# Patient Record
Sex: FEMALE | Race: WHITE | NOT HISPANIC OR LATINO | Employment: FULL TIME | URBAN - NONMETROPOLITAN AREA
[De-identification: names, ages, dates, MRNs, and addresses within clinical notes are randomized per-mention and may not be internally consistent; named-entity substitution may affect disease eponyms.]

---

## 2017-01-20 DIAGNOSIS — E06.3 HYPOTHYROIDISM DUE TO HASHIMOTO'S THYROIDITIS: Primary | ICD-10-CM

## 2017-01-23 RX ORDER — THYROID 60 MG
TABLET ORAL
Qty: 60 TABLET | Refills: 0 | Status: SHIPPED | OUTPATIENT
Start: 2017-01-23 | End: 2017-03-01

## 2017-01-23 NOTE — TELEPHONE ENCOUNTER
[place] Thyroid     Last Written Prescription Date: 12/08/2016  Last Quantity: 60, # refills: 0  Last Office Visit with G, P or [facility] prescribing provider: 09/19/2016        TSH   Date Value Ref Range Status   09/19/2016 0.05* 0.40 - 4.00 mU/L Final

## 2017-03-01 DIAGNOSIS — E06.3 HYPOTHYROIDISM DUE TO HASHIMOTO'S THYROIDITIS: ICD-10-CM

## 2017-03-02 RX ORDER — THYROID 60 MG
TABLET ORAL
Qty: 60 TABLET | Refills: 3 | Status: SHIPPED | OUTPATIENT
Start: 2017-03-02 | End: 2017-08-15

## 2017-04-06 DIAGNOSIS — E28.2 PCOS (POLYCYSTIC OVARIAN SYNDROME): ICD-10-CM

## 2017-04-06 RX ORDER — METFORMIN HCL 500 MG
TABLET, EXTENDED RELEASE 24 HR ORAL
Qty: 60 TABLET | Refills: 1 | Status: SHIPPED | OUTPATIENT
Start: 2017-04-06 | End: 2017-07-06

## 2017-04-06 NOTE — TELEPHONE ENCOUNTER
metformin         Last Written Prescription Date: 7/6/16  Last Fill Quantity: 180, # refills: 1  Last Office Visit with G, P or [facility] prescribing provider:  9/19/17        BP Readings from Last 3 Encounters:   10/04/16 131/76   09/19/16 121/70   07/01/16 121/74     No results found for: MICROL  No results found for: UMALCR  Creatinine   Date Value Ref Range Status   12/30/2014 0.60 0.52 - 1.04 mg/dL Final   ]  GFR Estimate   Date Value Ref Range Status   12/30/2014 >90  Non  GFR Calc   >60 mL/min/1.7m2 Final   08/13/2014 >90  Non  GFR Calc   >60 mL/min/1.7m2 Final   12/26/2013 84 >60 mL/min/1.7m2 Final     GFR Estimate If Black   Date Value Ref Range Status   12/30/2014 >90   GFR Calc   >60 mL/min/1.7m2 Final   08/13/2014 >90   GFR Calc   >60 mL/min/1.7m2 Final   12/26/2013 >90 >60 mL/min/1.7m2 Final     Lab Results   Component Value Date    CHOL 193 08/13/2014     Lab Results   Component Value Date    HDL 65 08/13/2014     Lab Results   Component Value Date     08/13/2014     Lab Results   Component Value Date    TRIG 97 08/13/2014     Lab Results   Component Value Date    CHOLHDLRATIO 3.0 08/13/2014     Lab Results   Component Value Date    AST 12 12/26/2013     Lab Results   Component Value Date    ALT 19 12/26/2013     No results found for: A1C  Potassium   Date Value Ref Range Status   12/30/2014 3.8 3.4 - 5.3 mmol/L Final

## 2017-04-19 ENCOUNTER — TELEPHONE (OUTPATIENT)
Dept: FAMILY MEDICINE | Facility: OTHER | Age: 27
End: 2017-04-19

## 2017-04-19 NOTE — TELEPHONE ENCOUNTER
2:56 PM    Reason for Call: OVERBOOK    Patient is having the following symptoms: possible sinus infection for 1 weeks.    The patient is requesting an appointment for sooner than 4-27-17, if possible with Dr Lambert.    Was an appointment offered for this call? Yes she has an appointment scheduled for 4-27-17    Preferred method for responding to this message: Telephone Call [phone]    If we cannot reach you directly, may we leave a detailed response at the number you provided? Yes    Can this message wait until your PCP/provider returns, if unavailable today? YES    Juliana Leon

## 2017-04-20 ENCOUNTER — OFFICE VISIT (OUTPATIENT)
Dept: FAMILY MEDICINE | Facility: OTHER | Age: 27
End: 2017-04-20
Attending: FAMILY MEDICINE
Payer: COMMERCIAL

## 2017-04-20 VITALS
OXYGEN SATURATION: 98 % | HEART RATE: 71 BPM | DIASTOLIC BLOOD PRESSURE: 78 MMHG | RESPIRATION RATE: 20 BRPM | SYSTOLIC BLOOD PRESSURE: 108 MMHG | HEIGHT: 66 IN | BODY MASS INDEX: 30.53 KG/M2 | WEIGHT: 190 LBS | TEMPERATURE: 98.3 F

## 2017-04-20 DIAGNOSIS — H69.91 DYSFUNCTION OF EUSTACHIAN TUBE, RIGHT: ICD-10-CM

## 2017-04-20 DIAGNOSIS — E06.3 HYPOTHYROIDISM DUE TO HASHIMOTO'S THYROIDITIS: Primary | ICD-10-CM

## 2017-04-20 DIAGNOSIS — K58.0 IRRITABLE BOWEL SYNDROME WITH DIARRHEA: ICD-10-CM

## 2017-04-20 LAB — TSH SERPL DL<=0.005 MIU/L-ACNC: 1.17 MU/L (ref 0.4–4)

## 2017-04-20 PROCEDURE — 99214 OFFICE O/P EST MOD 30 MIN: CPT | Performed by: FAMILY MEDICINE

## 2017-04-20 PROCEDURE — 84443 ASSAY THYROID STIM HORMONE: CPT | Performed by: FAMILY MEDICINE

## 2017-04-20 PROCEDURE — 36415 COLL VENOUS BLD VENIPUNCTURE: CPT | Performed by: FAMILY MEDICINE

## 2017-04-20 ASSESSMENT — ANXIETY QUESTIONNAIRES
1. FEELING NERVOUS, ANXIOUS, OR ON EDGE: NOT AT ALL
3. WORRYING TOO MUCH ABOUT DIFFERENT THINGS: NOT AT ALL
4. TROUBLE RELAXING: NOT AT ALL
IF YOU CHECKED OFF ANY PROBLEMS ON THIS QUESTIONNAIRE, HOW DIFFICULT HAVE THESE PROBLEMS MADE IT FOR YOU TO DO YOUR WORK, TAKE CARE OF THINGS AT HOME, OR GET ALONG WITH OTHER PEOPLE: NOT DIFFICULT AT ALL
GAD7 TOTAL SCORE: 0
5. BEING SO RESTLESS THAT IT IS HARD TO SIT STILL: NOT AT ALL
2. NOT BEING ABLE TO STOP OR CONTROL WORRYING: NOT AT ALL
7. FEELING AFRAID AS IF SOMETHING AWFUL MIGHT HAPPEN: NOT AT ALL
6. BECOMING EASILY ANNOYED OR IRRITABLE: NOT AT ALL

## 2017-04-20 ASSESSMENT — PAIN SCALES - GENERAL: PAINLEVEL: NO PAIN (1)

## 2017-04-20 NOTE — MR AVS SNAPSHOT
After Visit Summary   4/20/2017    Meredith Gordon    MRN: [id number]           Patient Information     Date Of Birth          1990        Visit Information        Provider Department      4/20/2017 9:15 AM Jovana Lambert MD [facility]        Today's Diagnoses     Hypothyroidism due to Hashimoto's thyroiditis    -  1    Dysfunction of eustachian tube, right          Care Instructions      Irritable Bowel Syndrome    Irritable bowel syndrome (IBS) is a disorder of the intestines. It is not a disease, but a group of symptoms caused by changes in the way the intestines work. It is fairly common, but the cause is not well understood.  Symptoms of IBS include:    Abdominal pain, discomfort, and cramping    Diarrhea    Constipation or dry, hard stools    Mucous stool    Bloating    Feeling of incomplete bowel movements  It usually results in one of 3 patterns of symptoms:    Chronic abdominal pain and constipation    Recurring episodes of diarrhea, with or without pain    Alternating diarrhea and constipation  Home care  The goal of treatment is to control and relieve your symptoms, so you can lead a full and active life. There is no cure for IBS. But it can be managed.  Diet  Your diet did not cause your IBS, but it can affect it. No one diet works for everyone. Finding the best foods for you may take trial and error. Keep a food log to help find what foods made your symptoms worse. Below are some tips that may help you.    Eat more slowly. Eat smaller amounts at a time, but more often. Remember, you can always eat more, but cannot eat less once you ve eaten too much.    High-fiber foods are complicated. While they may help relieve constipation, they can make your bloating, cramping, gas, and diarrhea worse.    Eat less sugar.    Try cutting out dairy products. Sometimes this helps.    Try cutting out foods that are high in fat and fatty meats.    You can control bloating or passing  excess gas. Be careful with  gassy  vegetables and fruits like beans, cabbage, broccoli, and cauliflower.    Be careful with carbonated beverages and fruit juices. They can make your bloating and diarrhea worse.    Caffeine, alcohol, and stimulants can make symptoms worse. These include coffee, tea, sodas, energy drinks, and chocolate.  Lifestyle    Look for factors that seem to worsen your symptoms. These include stress and emotions.     Although stress does not cause IBS, it may trigger flare-ups. Counseling can help you learn to handle stress. So can self-help measures like exercise, yoga, and meditation.    Depression can occur along with IBS. Your health care provider may prescribe antidepressant medicine. This may help with diarrhea, constipation, and cramping, as well as with symptoms of depression.    Smoking doesn't cause IBS, but can make the symptoms worse.  Medicines  Your healthcare provider may prescribe medicines. Take them as directed. For acute flare-ups of your illness, your provider may give you prescription medicines.    Check with your health care provider before taking any medicines for diarrhea.    Avoid anti-inflammatory medicines like ibuprofen or naproxen.    Consider nutritional supplements. This is especially true if your diarrhea is prolonged, or you aren't eating or are losing weight  Follow-up care  Follow up with your health care provider, or as advised. If a stool sample was taken or cultures were done, you will be told if your treatment needs to change. You can call as directed for the results.  When to seek medical advice  Call your health care provider right away if any of these occur:    Abdominal pain gets worse    Constant abdominal pain moves to the right-lower abdomen    You can't keep liquids down because of vomiting    You have severe diarrhea    You have blood (red or black color) or mucus in your stool    You feel very weak or dizzy, faint, or have extreme thirst    You  "have a fever of 100.4 F (38.0 C) or higher, or as directed by your health care provider    7810-7191 [employer]. [address] [place] [address], [place] [address]. All rights reserved. This information is not intended as a substitute for professional medical care. Always follow your healthcare professional's instructions.              Follow-ups after your visit        Who to contact     If you have questions or need follow up information about today's clinic visit or your schedule please contact [facility] directly at [phone].  Normal or non-critical lab and imaging results will be communicated to you by [employer], letter or phone within 4 business days after the clinic has received the results. If you do not hear from us within 7 days, please contact the clinic through [employer] or phone. If you have a critical or abnormal lab result, we will notify you by phone as soon as possible.  Submit refill requests through [employer] or call your pharmacy and they will forward the refill request to us. Please allow 3 business days for your refill to be completed.          Additional Information About Your Visit        MyChart Information     [employer] gives you secure access to your electronic health record. If you see a primary care provider, you can also send messages to your care team and make appointments. If you have questions, please call your primary care clinic.  If you do not have a primary care provider, please call [phone] and they will assist you.        Care EveryWhere ID     This is your Care EveryWhere ID. This could be used by other organizations to access your [facility] medical records  [id number]        Your Vitals Were     Pulse Temperature Respirations Height Pulse Oximetry BMI (Body Mass Index)    71 98.3  F (36.8  C) (Tympanic) 20 5' 5.5\" (1.664 m) 98% 31.14 kg/m2       Blood Pressure from Last 3 Encounters:   04/20/17 108/78   10/04/16 131/76   09/19/16 121/70    Weight from " Last 3 Encounters:   04/20/17 190 lb (86.2 kg)   09/19/16 191 lb (86.6 kg)   07/01/16 198 lb (89.8 kg)              We Performed the Following     TSH with free T4 reflex        Primary Care Provider Office Phone # Fax #    Jovana Lambert [phone]       [facility] [place] [address]  [place] [address]        Thank you!     Thank you for choosing [facility]  for your care. Our goal is always to provide you with excellent care. Hearing back from our patients is one way we can continue to improve our services. Please take a few minutes to complete the written survey that you may receive in the mail after your visit with us. Thank you!             Your Updated Medication List - Protect others around you: Learn how to safely use, store and throw away your medicines at www.disposemymeds.org.          This list is accurate as of: 4/20/17  9:30 AM.  Always use your most recent med list.                   Brand Name Dispense Instructions for use    DYAN THYROID 60 MG tablet   Generic drug:  thyroid     60 tablet    Take 1 tablet (60 mg) by mouth 2 times daily       metFORMIN 500 MG 24 hr tablet    GLUCOPHAGE-XR    60 tablet    TAKE 2 TABLETS BY MOUTH EVERY NIGHT AT BEDTIME       mupirocin 2 % ointment    BACTROBAN    15 g    Apply topically 2 times daily       spironolactone 50 MG tablet    ALDACTONE    180 tablet    Take 1 tablet (50 mg) by mouth 2 times daily

## 2017-04-20 NOTE — PROGRESS NOTES
SUBJECTIVE:                                                    Meredith Gordon is a 27 year old female who presents to clinic today for the following health issues:      RESPIRATORY SYMPTOMS      Duration: 3 weeks    Description  nasal congestion, facial pain/pressure and ear pain right    Severity: moderate    Accompanying signs and symptoms: None    History (predisposing factors):  none    Precipitating or alleviating factors: None    Therapies tried and outcome:  rest and fluids       Diarrhea      Duration: one day    Description:       Consistency of stool: loose       Blood in stool: no        Number of loose stools past 24 hours: 2    Intensity:  mild    Accompanying signs and symptoms:       Fever: no        Nausea/vomitting: YES       Abdominal pain: no        Weight loss: no     History (recent antibiotics or travel/ill contacts/med changes/testing done): ate pastrami panini    Precipitating or alleviating factors: None    Therapies tried and outcome: none       Hypothyroidism Follow-up      Since last visit, patient describes the following symptoms: Weight stable, no hair loss, no skin changes, no constipation, no loose stools       Problem list and histories reviewed & adjusted, as indicated.  Additional history: as documented    Current Outpatient Prescriptions   Medication Sig Dispense Refill     metFORMIN (GLUCOPHAGE-XR) 500 MG 24 hr tablet TAKE 2 TABLETS BY MOUTH EVERY NIGHT AT BEDTIME 60 tablet 1     ARMOUR THYROID 60 MG tablet Take 1 tablet (60 mg) by mouth 2 times daily 60 tablet 3     mupirocin (BACTROBAN) 2 % ointment Apply topically 2 times daily 15 g 1     spironolactone (ALDACTONE) 50 MG tablet Take 1 tablet (50 mg) by mouth 2 times daily 180 tablet 1     Labs reviewed in EPIC    Reviewed and updated as needed this visit by clinical staff  Tobacco  Allergies  Meds  Med Hx  Surg Hx  Fam Hx  Soc Hx      Reviewed and updated as needed this visit by Provider         ROS:  Constitutional,  "HEENT, cardiovascular, pulmonary, gi and gu systems are negative, except as otherwise noted.    OBJECTIVE:                                                    /78 (BP Location: Right arm, Patient Position: Chair, Cuff Size: Adult Large)  Pulse 71  Temp 98.3  F (36.8  C) (Tympanic)  Resp 20  Ht 5' 5.5\" (1.664 m)  Wt 190 lb (86.2 kg)  SpO2 98%  BMI 31.14 kg/m2  Body mass index is 31.14 kg/(m^2).  GENERAL APPEARANCE: healthy, alert and no distress  HENT: ear canals and TM's normal, nose and mouth without ulcers or lesions and TM fluid bilateral  NECK: no adenopathy, no asymmetry, masses, or scars and thyroid normal to palpation  RESP: lungs clear to auscultation - no rales, rhonchi or wheezes  CV: regular rates and rhythm, normal S1 S2, no S3 or S4 and no murmur, click or rub  ABDOMEN: soft, nontender, without hepatosplenomegaly or masses and bowel sounds normal  PSYCH: mentation appears normal and affect normal/bright       ASSESSMENT/PLAN:                                                    1. Hypothyroidism due to Hashimoto's thyroiditis  Due for labs  - TSH with free T4 reflex    2. Dysfunction of eustachian tube, right  With viral URI  dsicussed conservative measures    3. Irritable bowel syndrome with diarrhea  dsicussed trial of GF or dairy free foods  F/u if not improving    aslso doing weight watchers but having slow progress    Patient was agreeable to this plan and had no further questions.  See Patient Instructions    Jovana Lambert MD  [facility] [place]  "

## 2017-04-20 NOTE — NURSING NOTE
"Chief Complaint   Patient presents with     Sinus Problem       Initial /78 (BP Location: Right arm, Patient Position: Chair, Cuff Size: Adult Large)  Pulse 71  Temp 98.3  F (36.8  C) (Tympanic)  Resp 20  Ht 5' 5.5\" (1.664 m)  Wt 190 lb (86.2 kg)  SpO2 98%  BMI 31.14 kg/m2 Estimated body mass index is 31.14 kg/(m^2) as calculated from the following:    Height as of this encounter: 5' 5.5\" (1.664 m).    Weight as of this encounter: 190 lb (86.2 kg).  Medication Reconciliation: complete   Genevieve Mead      "

## 2017-04-20 NOTE — PATIENT INSTRUCTIONS
Irritable Bowel Syndrome    Irritable bowel syndrome (IBS) is a disorder of the intestines. It is not a disease, but a group of symptoms caused by changes in the way the intestines work. It is fairly common, but the cause is not well understood.  Symptoms of IBS include:    Abdominal pain, discomfort, and cramping    Diarrhea    Constipation or dry, hard stools    Mucous stool    Bloating    Feeling of incomplete bowel movements  It usually results in one of 3 patterns of symptoms:    Chronic abdominal pain and constipation    Recurring episodes of diarrhea, with or without pain    Alternating diarrhea and constipation  Home care  The goal of treatment is to control and relieve your symptoms, so you can lead a full and active life. There is no cure for IBS. But it can be managed.  Diet  Your diet did not cause your IBS, but it can affect it. No one diet works for everyone. Finding the best foods for you may take trial and error. Keep a food log to help find what foods made your symptoms worse. Below are some tips that may help you.    Eat more slowly. Eat smaller amounts at a time, but more often. Remember, you can always eat more, but cannot eat less once you ve eaten too much.    High-fiber foods are complicated. While they may help relieve constipation, they can make your bloating, cramping, gas, and diarrhea worse.    Eat less sugar.    Try cutting out dairy products. Sometimes this helps.    Try cutting out foods that are high in fat and fatty meats.    You can control bloating or passing excess gas. Be careful with  gassy  vegetables and fruits like beans, cabbage, broccoli, and cauliflower.    Be careful with carbonated beverages and fruit juices. They can make your bloating and diarrhea worse.    Caffeine, alcohol, and stimulants can make symptoms worse. These include coffee, tea, sodas, energy drinks, and chocolate.  Lifestyle    Look for factors that seem to worsen your symptoms. These include stress and  emotions.     Although stress does not cause IBS, it may trigger flare-ups. Counseling can help you learn to handle stress. So can self-help measures like exercise, yoga, and meditation.    Depression can occur along with IBS. Your health care provider may prescribe antidepressant medicine. This may help with diarrhea, constipation, and cramping, as well as with symptoms of depression.    Smoking doesn't cause IBS, but can make the symptoms worse.  Medicines  Your healthcare provider may prescribe medicines. Take them as directed. For acute flare-ups of your illness, your provider may give you prescription medicines.    Check with your health care provider before taking any medicines for diarrhea.    Avoid anti-inflammatory medicines like ibuprofen or naproxen.    Consider nutritional supplements. This is especially true if your diarrhea is prolonged, or you aren't eating or are losing weight  Follow-up care  Follow up with your health care provider, or as advised. If a stool sample was taken or cultures were done, you will be told if your treatment needs to change. You can call as directed for the results.  When to seek medical advice  Call your health care provider right away if any of these occur:    Abdominal pain gets worse    Constant abdominal pain moves to the right-lower abdomen    You can't keep liquids down because of vomiting    You have severe diarrhea    You have blood (red or black color) or mucus in your stool    You feel very weak or dizzy, faint, or have extreme thirst    You have a fever of 100.4 F (38.0 C) or higher, or as directed by your health care provider    0171-6524 [employer]. [address] [place] [address], [place] [address]. All rights reserved. This information is not intended as a substitute for professional medical care. Always follow your healthcare professional's instructions.

## 2017-04-21 ASSESSMENT — PATIENT HEALTH QUESTIONNAIRE - PHQ9: SUM OF ALL RESPONSES TO PHQ QUESTIONS 1-9: 2

## 2017-04-21 ASSESSMENT — ANXIETY QUESTIONNAIRES: GAD7 TOTAL SCORE: 0

## 2017-05-23 DIAGNOSIS — L70.8 OTHER ACNE: ICD-10-CM

## 2017-05-23 DIAGNOSIS — E28.2 PCOS (POLYCYSTIC OVARIAN SYNDROME): ICD-10-CM

## 2017-05-25 RX ORDER — SPIRONOLACTONE 50 MG/1
TABLET, FILM COATED ORAL
Qty: 180 TABLET | Refills: 0 | Status: SHIPPED | OUTPATIENT
Start: 2017-05-25 | End: 2017-08-15

## 2017-07-06 DIAGNOSIS — E28.2 PCOS (POLYCYSTIC OVARIAN SYNDROME): ICD-10-CM

## 2017-07-07 RX ORDER — METFORMIN HCL 500 MG
TABLET, EXTENDED RELEASE 24 HR ORAL
Qty: 60 TABLET | Refills: 1 | Status: SHIPPED | OUTPATIENT
Start: 2017-07-07 | End: 2017-09-21

## 2017-07-07 NOTE — TELEPHONE ENCOUNTER
Metformin         Last Written Prescription Date: 4/06/2017  Last Fill Quantity: 60, # refills: 0  Last Office Visit with G, P or [facility] prescribing provider:  4/20/2017        BP Readings from Last 3 Encounters:   04/20/17 108/78   10/04/16 131/76   09/19/16 121/70     No results found for: MICROL  No results found for: UMALCR  Creatinine   Date Value Ref Range Status   12/30/2014 0.60 0.52 - 1.04 mg/dL Final   ]  GFR Estimate   Date Value Ref Range Status   12/30/2014 >90  Non  GFR Calc   >60 mL/min/1.7m2 Final   08/13/2014 >90  Non  GFR Calc   >60 mL/min/1.7m2 Final   12/26/2013 84 >60 mL/min/1.7m2 Final     GFR Estimate If Black   Date Value Ref Range Status   12/30/2014 >90   GFR Calc   >60 mL/min/1.7m2 Final   08/13/2014 >90   GFR Calc   >60 mL/min/1.7m2 Final   12/26/2013 >90 >60 mL/min/1.7m2 Final     Lab Results   Component Value Date    CHOL 193 08/13/2014     Lab Results   Component Value Date    HDL 65 08/13/2014     Lab Results   Component Value Date     08/13/2014     Lab Results   Component Value Date    TRIG 97 08/13/2014     Lab Results   Component Value Date    CHOLHDLRATIO 3.0 08/13/2014     Lab Results   Component Value Date    AST 12 12/26/2013     Lab Results   Component Value Date    ALT 19 12/26/2013     No results found for: A1C  Potassium   Date Value Ref Range Status   12/30/2014 3.8 3.4 - 5.3 mmol/L Final

## 2017-08-15 DIAGNOSIS — E28.2 PCOS (POLYCYSTIC OVARIAN SYNDROME): ICD-10-CM

## 2017-08-15 DIAGNOSIS — L70.8 OTHER ACNE: ICD-10-CM

## 2017-08-15 DIAGNOSIS — E06.3 HYPOTHYROIDISM DUE TO HASHIMOTO'S THYROIDITIS: ICD-10-CM

## 2017-08-16 RX ORDER — THYROID 60 MG
TABLET ORAL
Qty: 60 TABLET | Refills: 7 | Status: SHIPPED | OUTPATIENT
Start: 2017-08-16 | End: 2017-09-21 | Stop reason: DRUGHIGH

## 2017-08-16 RX ORDER — SPIRONOLACTONE 50 MG/1
TABLET, FILM COATED ORAL
Qty: 180 TABLET | Refills: 1 | Status: SHIPPED | OUTPATIENT
Start: 2017-08-16 | End: 2018-02-08

## 2017-09-20 ENCOUNTER — OFFICE VISIT (OUTPATIENT)
Dept: FAMILY MEDICINE | Facility: OTHER | Age: 27
End: 2017-09-20
Attending: FAMILY MEDICINE
Payer: COMMERCIAL

## 2017-09-20 VITALS
DIASTOLIC BLOOD PRESSURE: 72 MMHG | HEIGHT: 66 IN | TEMPERATURE: 97.8 F | SYSTOLIC BLOOD PRESSURE: 118 MMHG | BODY MASS INDEX: 31.98 KG/M2 | WEIGHT: 199 LBS | OXYGEN SATURATION: 99 % | HEART RATE: 91 BPM | RESPIRATION RATE: 14 BRPM

## 2017-09-20 DIAGNOSIS — E28.2 PCOS (POLYCYSTIC OVARIAN SYNDROME): ICD-10-CM

## 2017-09-20 DIAGNOSIS — K21.9 GASTROESOPHAGEAL REFLUX DISEASE, ESOPHAGITIS PRESENCE NOT SPECIFIED: ICD-10-CM

## 2017-09-20 DIAGNOSIS — Z00.00 ROUTINE GENERAL MEDICAL EXAMINATION AT A HEALTH CARE FACILITY: ICD-10-CM

## 2017-09-20 DIAGNOSIS — E06.3 HYPOTHYROIDISM DUE TO HASHIMOTO'S THYROIDITIS: Primary | ICD-10-CM

## 2017-09-20 DIAGNOSIS — E78.5 HYPERLIPIDEMIA LDL GOAL <130: ICD-10-CM

## 2017-09-20 PROCEDURE — 99395 PREV VISIT EST AGE 18-39: CPT | Performed by: FAMILY MEDICINE

## 2017-09-20 ASSESSMENT — ANXIETY QUESTIONNAIRES
5. BEING SO RESTLESS THAT IT IS HARD TO SIT STILL: NOT AT ALL
3. WORRYING TOO MUCH ABOUT DIFFERENT THINGS: NOT AT ALL
IF YOU CHECKED OFF ANY PROBLEMS ON THIS QUESTIONNAIRE, HOW DIFFICULT HAVE THESE PROBLEMS MADE IT FOR YOU TO DO YOUR WORK, TAKE CARE OF THINGS AT HOME, OR GET ALONG WITH OTHER PEOPLE: NOT DIFFICULT AT ALL
6. BECOMING EASILY ANNOYED OR IRRITABLE: NOT AT ALL
4. TROUBLE RELAXING: SEVERAL DAYS
GAD7 TOTAL SCORE: 1
1. FEELING NERVOUS, ANXIOUS, OR ON EDGE: NOT AT ALL
7. FEELING AFRAID AS IF SOMETHING AWFUL MIGHT HAPPEN: NOT AT ALL
2. NOT BEING ABLE TO STOP OR CONTROL WORRYING: NOT AT ALL

## 2017-09-20 ASSESSMENT — PATIENT HEALTH QUESTIONNAIRE - PHQ9: SUM OF ALL RESPONSES TO PHQ QUESTIONS 1-9: 5

## 2017-09-20 ASSESSMENT — PAIN SCALES - GENERAL: PAINLEVEL: NO PAIN (0)

## 2017-09-20 NOTE — MR AVS SNAPSHOT
After Visit Summary   9/20/2017    Meredith Gordon    MRN: [id number]           Patient Information     Date Of Birth          1990        Visit Information        Provider Department      9/20/2017 1:30 PM Jovana Lambert MD [facility]        Today's Diagnoses     Hypothyroidism due to Hashimoto's thyroiditis    -  1    Routine general medical examination at a health care facility        PCOS (polycystic ovarian syndrome)        Hyperlipidemia LDL goal <130          Care Instructions      Preventive Health Recommendations  Female Ages 26 - 39  Yearly exam:   See your health care provider every year in order to    Review health changes.     Discuss preventive care.      Review your medicines if you your doctor has prescribed any.    Until age 30: Get a Pap test every three years (more often if you have had an abnormal result).    After age 30: Talk to your doctor about whether you should have a Pap test every 3 years or have a Pap test with HPV screening every 5 years.   You do not need a Pap test if your uterus was removed (hysterectomy) and you have not had cancer.  You should be tested each year for STDs (sexually transmitted diseases), if you're at risk.   Talk to your provider about how often to have your cholesterol checked.  If you are at risk for diabetes, you should have a diabetes test (fasting glucose).  Shots: Get a flu shot each year. Get a tetanus shot every 10 years.   Nutrition:     Eat at least 5 servings of fruits and vegetables each day.    Eat whole-grain bread, whole-wheat pasta and brown rice instead of white grains and rice.    Talk to your provider about Calcium and Vitamin D.     Lifestyle    Exercise at least 150 minutes a week (30 minutes a day, 5 days of the week). This will help you control your weight and prevent disease.    Limit alcohol to one drink per day.    No smoking.     Wear sunscreen to prevent skin cancer.    See your dentist every six  "months for an exam and cleaning.            Follow-ups after your visit        Future tests that were ordered for you today     Open Future Orders        Priority Expected Expires Ordered    Comprehensive metabolic panel Routine  9/20/2018 9/20/2017    TSH with free T4 reflex Routine  9/20/2018 9/20/2017    Lipid Profile (Chol, Trig, HDL, LDL calc) Routine  9/20/2018 9/20/2017            Who to contact     If you have questions or need follow up information about today's clinic visit or your schedule please contact [facility] directly at [phone].  Normal or non-critical lab and imaging results will be communicated to you by "[employer]hart, letter or phone within 4 business days after the clinic has received the results. If you do not hear from us within 7 days, please contact the clinic through [employer] or phone. If you have a critical or abnormal lab result, we will notify you by phone as soon as possible.  Submit refill requests through [employer] or call your pharmacy and they will forward the refill request to us. Please allow 3 business days for your refill to be completed.          Additional Information About Your Visit        MyChart Information     [employer] gives you secure access to your electronic health record. If you see a primary care provider, you can also send messages to your care team and make appointments. If you have questions, please call your primary care clinic.  If you do not have a primary care provider, please call [phone] and they will assist you.        Care EveryWhere ID     This is your Care EveryWhere ID. This could be used by other organizations to access your [facility] medical records  [id number]        Your Vitals Were     Pulse Temperature Respirations Height Pulse Oximetry BMI (Body Mass Index)    91 97.8  F (36.6  C) (Tympanic) 14 5' 5.5\" (1.664 m) 99% 32.61 kg/m2       Blood Pressure from Last 3 Encounters:   09/20/17 118/72   04/20/17 108/78   10/04/16 131/76    " Weight from Last 3 Encounters:   09/20/17 199 lb (90.3 kg)   04/20/17 190 lb (86.2 kg)   09/19/16 191 lb (86.6 kg)               Primary Care Provider Office Phone # Fax #    Jovana Lambert [phone]       [facility] [place] [address]  [place] [address]        Equal Access to Services     NICK 81[employer] GroupSATURNINO : Hadii aad ku hadasho Soomaali, waaxda luqadaha, qaybta kaalmada adeegyada, waxay idiin hayaan adeeg kharash la'aan ah. So [facility] [phone].    ATENCIÓN: Si thalia crain, tiene a leija disposición servicios gratuitos de asistencia lingüística. Llame al [phone].    We comply with applicable federal civil rights laws and [place] laws. We do not discriminate on the basis of race, color, national origin, age, disability sex, sexual orientation or gender identity.            Thank you!     [facility] you [facility]  [facility]. Our goal is always to provide you with excellent care. Hearing back from our patients is one way we can continue to improve our services. Please take a few minutes to complete the written survey that you may receive in the mail after your visit with us. Thank you!             Your Updated Medication List - Protect others around you: Learn how to safely use, store and throw away your medicines at www.disposemymeds.org.          This list is accurate as of: 9/20/17  2:25 PM.  Always use your most recent med list.                   Brand Name Dispense Instructions for use Diagnosis    ARMOUR THYROID 60 MG tablet   Generic drug:  thyroid     60 tablet    Take 1 tablet (60 mg) by mouth 2 times daily    Hypothyroidism due to Hashimoto's thyroiditis       metFORMIN 500 MG 24 hr tablet    GLUCOPHAGE-XR    60 tablet    Take 2 Tabs by mouth every night at bedtime.    PCOS (polycystic ovarian syndrome)       mupirocin 2 % ointment    BACTROBAN    15 g    Apply topically 2 times daily        spironolactone 50 MG tablet    ALDACTONE    180 tablet     Take one tablet by mouth two times a day.    PCOS (polycystic ovarian syndrome), Other acne

## 2017-09-20 NOTE — NURSING NOTE
"Chief Complaint   Patient presents with     Physical       Initial /72 (BP Location: Left arm, Patient Position: Chair, Cuff Size: Adult Regular)  Pulse 91  Temp 97.8  F (36.6  C) (Tympanic)  Resp 14  Ht 5' 5.5\" (1.664 m)  Wt 199 lb (90.3 kg)  SpO2 99%  BMI 32.61 kg/m2 Estimated body mass index is 32.61 kg/(m^2) as calculated from the following:    Height as of this encounter: 5' 5.5\" (1.664 m).    Weight as of this encounter: 199 lb (90.3 kg).  Medication Reconciliation: complete     Savannah Martines     "

## 2017-09-20 NOTE — PROGRESS NOTES
SUBJECTIVE:   CC: Meredith Gordon is an 27 year old woman who presents for preventive health visit.     Healthy Habits:    Do you get at least three servings of calcium containing foods daily (dairy, green leafy vegetables, etc.)? yes    Amount of exercise or daily activities, outside of work: 2-3 day(s) per week    Problems taking medications regularly No    Medication side effects: No    Have you had an eye exam in the past two years? yes    Do you see a dentist twice per year? yes    Do you have sleep apnea, excessive snoring or daytime drowsiness?no          Irregular periods      Duration: 2-3 months    Description (location/character/radiation): getting period way to frequently, every 2 weeks. Brown blood. Clots. But not normal or consistent flow    Intensity:  mild    Accompanying signs and symptoms: none    History (similar episodes/previous evaluation): None    Precipitating or alleviating factors: None    Therapies tried and outcome: None         Today's PHQ-2 Score: PHQ-2 ( 1999 [employer]) 9/17/2017   Q1: Little interest or pleasure in doing things 1   Q2: Feeling down, depressed or hopeless 0   PHQ-2 Score 1   Q1: Little interest or pleasure in doing things Several days   Q2: Feeling down, depressed or hopeless Not at all   PHQ-2 Score 1         Abuse: Current or Past(Physical, Sexual or Emotional)- No  Do you feel safe in your environment - Yes    Social History   Substance Use Topics     Smoking status: Never Smoker     Smokeless tobacco: Never Used     Alcohol use 0.0 oz/week     0 Standard drinks or equivalent per week      Comment: 1/week     The patient does not drink >3 drinks per day nor >7 drinks per week.    Reviewed orders with patient.  Reviewed health maintenance and updated orders accordingly - Yes  BP Readings from Last 3 Encounters:   09/20/17 118/72   04/20/17 108/78   10/04/16 131/76    Wt Readings from Last 3 Encounters:   09/20/17 199 lb (90.3 kg)   04/20/17 190 lb (86.2 kg)    09/19/16 191 lb (86.6 kg)                  Patient Active Problem List   Diagnosis     PCOS (polycystic ovarian syndrome)     ACP (advance care planning)     Hypothyroidism due to Hashimoto's thyroiditis     Past Surgical History:   Procedure Laterality Date     MAMMOPLASTY REDUCTION  2015     wisdom teeth         Social History   Substance Use Topics     Smoking status: Never Smoker     Smokeless tobacco: Never Used     Alcohol use 0.0 oz/week     0 Standard drinks or equivalent per week      Comment: 1/week     Family History   Problem Relation Age of Onset     Thyroid Disease Mother      hypothyroidism     Nephrolithiasis Father      Other - See Comments Sister      pcos     Family History Negative Sister      Family History Negative Brother      Family History Negative Brother      Family History Negative Brother      Family History Negative Sister      Hypothyroidism Maternal Grandmother      Rheumatoid Arthritis Maternal Grandfather      DIABETES Paternal Grandfather      type 2     Hypertension Paternal Grandfather          Current Outpatient Prescriptions   Medication Sig Dispense Refill     ARMOUR THYROID 60 MG tablet Take 1 tablet (60 mg) by mouth 2 times daily 60 tablet 7     spironolactone (ALDACTONE) 50 MG tablet Take one tablet by mouth two times a day. 180 tablet 1     metFORMIN (GLUCOPHAGE-XR) 500 MG 24 hr tablet Take 2 Tabs by mouth every night at bedtime. 60 tablet 1     mupirocin (BACTROBAN) 2 % ointment Apply topically 2 times daily 15 g 1     Allergies   Allergen Reactions     Cats Itching     Cat/feline product derivatives           Mammogram not appropriate for this patient based on age.    Pertinent mammograms are reviewed under the imaging tab.  History of abnormal Pap smear: NO - age 21-29 PAP every 3 years recommended    Reviewed and updated as needed this visit by clinical staffTobacco  Allergies  Meds  Problems  Med Hx  Surg Hx  Fam Hx  Soc Hx          Reviewed and updated as  "needed this visit by Provider        Past Medical History:   Diagnosis Date     B12 deficiency 2012    resolved     Hashimoto's disease 2011     Nephrolithiasis     2011 in [facility] ([facility])     PCOS (polycystic ovarian syndrome)       Past Surgical History:   Procedure Laterality Date     MAMMOPLASTY REDUCTION  2015     wisdom teeth       Obstetric History       T0      L0     SAB0   TAB0   Ectopic0   Multiple0   Live Births0           ROS:  C: NEGATIVE for fever, chills, change in weight  I: NEGATIVE for worrisome rashes, moles or lesions  E: NEGATIVE for vision changes or irritation  ENT: NEGATIVE for ear, mouth and throat problems  R: NEGATIVE for significant cough or SOB  B: NEGATIVE for masses, tenderness or discharge  CV: NEGATIVE for chest pain, palpitations or peripheral edema  GI: NEGATIVE for nausea, abdominal pain, heartburn, or change in bowel habits  : NEGATIVE for unusual urinary or vaginal symptoms. Periods are irregular.  M: NEGATIVE for significant arthralgias or myalgia  N: NEGATIVE for weakness, dizziness or paresthesias  P: NEGATIVE for changes in mood or affect    OBJECTIVE:   /72 (BP Location: Left arm, Patient Position: Chair, Cuff Size: Adult Regular)  Pulse 91  Temp 97.8  F (36.6  C) (Tympanic)  Resp 14  Ht 5' 5.5\" (1.664 m)  Wt 199 lb (90.3 kg)  SpO2 99%  BMI 32.61 kg/m2  EXAM:  GENERAL: healthy, alert and no distress  EYES: Eyes grossly normal to inspection, PERRL and conjunctivae and sclerae normal  HENT: ear canals and TM's normal, nose and mouth without ulcers or lesions  NECK: no adenopathy, no asymmetry, masses, or scars and thyroid normal to palpation  RESP: lungs clear to auscultation - no rales, rhonchi or wheezes  BREAST: normal without masses, tenderness or nipple discharge and no palpable axillary masses or adenopathy  CV: regular rate and rhythm, normal S1 S2, no S3 or S4, no murmur, click or rub, no peripheral edema and " "peripheral pulses strong  ABDOMEN: soft, nontender, no hepatosplenomegaly, no masses and bowel sounds normal  MS: no gross musculoskeletal defects noted, no edema  SKIN: no suspicious lesions or rashes  NEURO: Normal strength and tone, mentation intact and speech normal  PSYCH: mentation appears normal, affect normal/bright    ASSESSMENT/PLAN:   1. Hypothyroidism due to Hashimoto's thyroiditis    - TSH with free T4 reflex; Future    2. Routine general medical examination at a health care facility      3. PCOS (polycystic ovarian syndrome)  Pending thyroid results may need u/s  - Comprehensive metabolic panel; Future    4. Hyperlipidemia LDL goal <130    - Comprehensive metabolic panel; Future  - Lipid Profile (Chol, Trig, HDL, LDL calc); Future    5.  irreg menses -- if thyroid is wnl, may need to back off on spironolactone or increase metformin    COUNSELING:   Reviewed preventive health counseling, as reflected in patient instructions  Special attention given to:        Regular exercise       Healthy diet/nutrition       Vision screening       Hearing screening         reports that she has never smoked. She has never used smokeless tobacco.    Estimated body mass index is 32.61 kg/(m^2) as calculated from the following:    Height as of this encounter: 5' 5.5\" (1.664 m).    Weight as of this encounter: 199 lb (90.3 kg).   Weight management plan: Discussed healthy diet and exercise guidelines and patient will follow up in 3 months in clinic to re-evaluate.    Counseling Resources:  ATP IV Guidelines  Pooled Cohorts Equation Calculator  Breast Cancer Risk Calculator  FRAX Risk Assessment  ICSI Preventive Guidelines  Dietary Guidelines for Americans, 2010  USDA's MyPlate  ASA Prophylaxis  Lung CA Screening    Jovana Lambert MD  [facility] [place]  Answers for HPI/ROS submitted by the patient on 9/17/2017   Annual Exam:  Getting at least 3 servings of Calcium per day:: Yes  Bi-annual eye exam:: Yes  Dental " care twice a year:: Yes  Sleep apnea or symptoms of sleep apnea:: Daytime drowsiness  Diet:: Regular (no restrictions)  Frequency of exercise:: 2-3 days/week  Taking medications regularly:: Yes  Medication side effects:: None  Additional concerns today:: YES  PHQ-2 Score: 1  Duration of exercise:: 15-30 minutes

## 2017-09-21 DIAGNOSIS — E28.2 PCOS (POLYCYSTIC OVARIAN SYNDROME): ICD-10-CM

## 2017-09-21 DIAGNOSIS — E06.3 HYPOTHYROIDISM DUE TO HASHIMOTO'S THYROIDITIS: ICD-10-CM

## 2017-09-21 DIAGNOSIS — E78.5 HYPERLIPIDEMIA LDL GOAL <130: ICD-10-CM

## 2017-09-21 LAB
ALBUMIN SERPL-MCNC: 3.8 G/DL (ref 3.4–5)
ALP SERPL-CCNC: 68 U/L (ref 40–150)
ALT SERPL W P-5'-P-CCNC: 18 U/L (ref 0–50)
ANION GAP SERPL CALCULATED.3IONS-SCNC: 6 MMOL/L (ref 3–14)
AST SERPL W P-5'-P-CCNC: 13 U/L (ref 0–45)
BILIRUB SERPL-MCNC: 0.4 MG/DL (ref 0.2–1.3)
BUN SERPL-MCNC: 8 MG/DL (ref 7–30)
CALCIUM SERPL-MCNC: 9.5 MG/DL (ref 8.5–10.1)
CHLORIDE SERPL-SCNC: 107 MMOL/L (ref 94–109)
CHOLEST SERPL-MCNC: 207 MG/DL
CO2 SERPL-SCNC: 26 MMOL/L (ref 20–32)
CREAT SERPL-MCNC: 0.73 MG/DL (ref 0.52–1.04)
GFR SERPL CREATININE-BSD FRML MDRD: >90 ML/MIN/1.7M2
GLUCOSE SERPL-MCNC: 95 MG/DL (ref 70–99)
HDLC SERPL-MCNC: 61 MG/DL
LDLC SERPL CALC-MCNC: 120 MG/DL
NONHDLC SERPL-MCNC: 146 MG/DL
POTASSIUM SERPL-SCNC: 3.8 MMOL/L (ref 3.4–5.3)
PROT SERPL-MCNC: 8 G/DL (ref 6.8–8.8)
SODIUM SERPL-SCNC: 139 MMOL/L (ref 133–144)
T4 FREE SERPL-MCNC: 0.61 NG/DL (ref 0.76–1.46)
TRIGL SERPL-MCNC: 128 MG/DL
TSH SERPL DL<=0.005 MIU/L-ACNC: 7.32 MU/L (ref 0.4–4)

## 2017-09-21 PROCEDURE — 84443 ASSAY THYROID STIM HORMONE: CPT | Performed by: FAMILY MEDICINE

## 2017-09-21 PROCEDURE — 36415 COLL VENOUS BLD VENIPUNCTURE: CPT | Performed by: FAMILY MEDICINE

## 2017-09-21 PROCEDURE — 80061 LIPID PANEL: CPT | Performed by: FAMILY MEDICINE

## 2017-09-21 PROCEDURE — 80053 COMPREHEN METABOLIC PANEL: CPT | Performed by: FAMILY MEDICINE

## 2017-09-21 PROCEDURE — 84439 ASSAY OF FREE THYROXINE: CPT | Performed by: FAMILY MEDICINE

## 2017-09-21 RX ORDER — METFORMIN HCL 500 MG
TABLET, EXTENDED RELEASE 24 HR ORAL
Qty: 60 TABLET | Refills: 5 | Status: SHIPPED | OUTPATIENT
Start: 2017-09-21 | End: 2018-04-16

## 2017-09-21 RX ORDER — THYROID 90 MG/1
TABLET ORAL
Qty: 60 TABLET | Refills: 1 | Status: SHIPPED | OUTPATIENT
Start: 2017-09-21 | End: 2017-11-29

## 2017-09-21 ASSESSMENT — ANXIETY QUESTIONNAIRES: GAD7 TOTAL SCORE: 1

## 2017-11-29 DIAGNOSIS — E06.3 HYPOTHYROIDISM DUE TO HASHIMOTO'S THYROIDITIS: ICD-10-CM

## 2017-11-29 DIAGNOSIS — K21.9 GASTROESOPHAGEAL REFLUX DISEASE, ESOPHAGITIS PRESENCE NOT SPECIFIED: ICD-10-CM

## 2017-12-01 NOTE — TELEPHONE ENCOUNTER
Zantac       Last Written Prescription Date: 9/20/2017  Last Fill Quantity: 30,  # refills: 1   Last Office Visit with [facility], [facility] or  Health prescribing provider: 9/20/2017                                             [facility]       Last Written Prescription Date: 9/21/2017  Last Fill Quantity: 60,  # refills: 1   Last Office Visit with [facility], [facility] or  Health prescribing provider: 9/20/2017

## 2017-12-04 RX ORDER — THYROID,PORK 90 MG
TABLET ORAL
Qty: 60 TABLET | Refills: 0 | Status: SHIPPED | OUTPATIENT
Start: 2017-12-04 | End: 2018-01-05

## 2018-01-05 DIAGNOSIS — E06.3 HYPOTHYROIDISM DUE TO HASHIMOTO'S THYROIDITIS: ICD-10-CM

## 2018-01-08 NOTE — TELEPHONE ENCOUNTER
DYAN THYROID 90 MG TABS     Last Written Prescription Date:  12/4/17  Last Fill Quantity: 60,   # refills: 0  Last Office Visit: 9/20/17  Future Office visit:

## 2018-01-09 RX ORDER — THYROID,PORK 90 MG
TABLET ORAL
Qty: 60 TABLET | Refills: 2 | Status: SHIPPED | OUTPATIENT
Start: 2018-01-09 | End: 2018-04-16

## 2018-02-08 DIAGNOSIS — K21.9 GASTROESOPHAGEAL REFLUX DISEASE, ESOPHAGITIS PRESENCE NOT SPECIFIED: ICD-10-CM

## 2018-02-08 DIAGNOSIS — L70.8 OTHER ACNE: ICD-10-CM

## 2018-02-08 DIAGNOSIS — E28.2 PCOS (POLYCYSTIC OVARIAN SYNDROME): ICD-10-CM

## 2018-02-09 RX ORDER — SPIRONOLACTONE 50 MG/1
TABLET, FILM COATED ORAL
Qty: 180 TABLET | Refills: 0 | Status: SHIPPED | OUTPATIENT
Start: 2018-02-09 | End: 2018-08-19

## 2018-04-16 DIAGNOSIS — E06.3 HYPOTHYROIDISM DUE TO HASHIMOTO'S THYROIDITIS: ICD-10-CM

## 2018-04-16 DIAGNOSIS — E28.2 PCOS (POLYCYSTIC OVARIAN SYNDROME): ICD-10-CM

## 2018-04-16 NOTE — TELEPHONE ENCOUNTER
Metformin 500mg  Last Written Prescription Date:  9/21/17  Last Fill Quantity: 60,   # refills: 5  Last Office Visit: 9/20/17  Future Office visit:       [facility]roid 90mg   Last Written Prescription Date:  1/9/18  Last Fill Quantity: 60,   # refills: 2  Last Office Visit: 9/20/17  Future Office visit:

## 2018-04-17 RX ORDER — METFORMIN HCL 500 MG
TABLET, EXTENDED RELEASE 24 HR ORAL
Qty: 60 TABLET | Refills: 5 | Status: SHIPPED | OUTPATIENT
Start: 2018-04-17 | End: 2018-12-05

## 2018-04-17 NOTE — TELEPHONE ENCOUNTER
Please see note below for [facility].  Patient due for recheck of labs.       Normal TSH on file in past 12 months           Recent Labs   Lab Test  09/21/17   0732   TSH  7.32*        Last lab note states recheck in 2 months.  No recheck.  PCP Fausto.  Please advise.  Thank you.

## 2018-04-18 DIAGNOSIS — E06.3 HYPOTHYROIDISM DUE TO HASHIMOTO'S THYROIDITIS: Primary | ICD-10-CM

## 2018-04-18 RX ORDER — THYROID,PORK 90 MG
TABLET ORAL
Qty: 60 TABLET | Refills: 0 | Status: SHIPPED | OUTPATIENT
Start: 2018-04-18 | End: 2018-05-23

## 2018-05-11 DIAGNOSIS — K21.9 GASTROESOPHAGEAL REFLUX DISEASE, ESOPHAGITIS PRESENCE NOT SPECIFIED: ICD-10-CM

## 2018-06-18 ENCOUNTER — OFFICE VISIT (OUTPATIENT)
Dept: FAMILY MEDICINE | Facility: OTHER | Age: 28
End: 2018-06-18
Attending: FAMILY MEDICINE
Payer: COMMERCIAL

## 2018-06-18 VITALS
WEIGHT: 203 LBS | OXYGEN SATURATION: 100 % | SYSTOLIC BLOOD PRESSURE: 124 MMHG | BODY MASS INDEX: 33.27 KG/M2 | TEMPERATURE: 98.1 F | HEART RATE: 78 BPM | DIASTOLIC BLOOD PRESSURE: 86 MMHG

## 2018-06-18 DIAGNOSIS — R53.83 FATIGUE, UNSPECIFIED TYPE: Primary | ICD-10-CM

## 2018-06-18 DIAGNOSIS — E28.2 PCOS (POLYCYSTIC OVARIAN SYNDROME): ICD-10-CM

## 2018-06-18 DIAGNOSIS — E06.3 HYPOTHYROIDISM DUE TO HASHIMOTO'S THYROIDITIS: ICD-10-CM

## 2018-06-18 LAB
IRON SATN MFR SERPL: 4 % (ref 15–46)
IRON SERPL-MCNC: 17 UG/DL (ref 35–180)
T4 FREE SERPL-MCNC: 0.42 NG/DL (ref 0.76–1.46)
TIBC SERPL-MCNC: 455 UG/DL (ref 240–430)
TSH SERPL DL<=0.005 MIU/L-ACNC: 10.65 MU/L (ref 0.4–4)

## 2018-06-18 PROCEDURE — 36415 COLL VENOUS BLD VENIPUNCTURE: CPT | Performed by: FAMILY MEDICINE

## 2018-06-18 PROCEDURE — 83540 ASSAY OF IRON: CPT | Performed by: FAMILY MEDICINE

## 2018-06-18 PROCEDURE — 83550 IRON BINDING TEST: CPT | Performed by: FAMILY MEDICINE

## 2018-06-18 PROCEDURE — 84443 ASSAY THYROID STIM HORMONE: CPT | Performed by: FAMILY MEDICINE

## 2018-06-18 PROCEDURE — 99214 OFFICE O/P EST MOD 30 MIN: CPT | Performed by: FAMILY MEDICINE

## 2018-06-18 PROCEDURE — 84439 ASSAY OF FREE THYROXINE: CPT | Performed by: FAMILY MEDICINE

## 2018-06-18 RX ORDER — THYROID 120 MG/1
120 TABLET ORAL
Qty: 60 TABLET | Refills: 1 | Status: SHIPPED | OUTPATIENT
Start: 2018-06-18 | End: 2018-09-04

## 2018-06-18 ASSESSMENT — ANXIETY QUESTIONNAIRES
2. NOT BEING ABLE TO STOP OR CONTROL WORRYING: NOT AT ALL
5. BEING SO RESTLESS THAT IT IS HARD TO SIT STILL: NOT AT ALL
1. FEELING NERVOUS, ANXIOUS, OR ON EDGE: NOT AT ALL
7. FEELING AFRAID AS IF SOMETHING AWFUL MIGHT HAPPEN: NOT AT ALL
6. BECOMING EASILY ANNOYED OR IRRITABLE: NOT AT ALL
GAD7 TOTAL SCORE: 0
IF YOU CHECKED OFF ANY PROBLEMS ON THIS QUESTIONNAIRE, HOW DIFFICULT HAVE THESE PROBLEMS MADE IT FOR YOU TO DO YOUR WORK, TAKE CARE OF THINGS AT HOME, OR GET ALONG WITH OTHER PEOPLE: NOT DIFFICULT AT ALL
3. WORRYING TOO MUCH ABOUT DIFFERENT THINGS: NOT AT ALL

## 2018-06-18 ASSESSMENT — PATIENT HEALTH QUESTIONNAIRE - PHQ9: 5. POOR APPETITE OR OVEREATING: NOT AT ALL

## 2018-06-18 ASSESSMENT — PAIN SCALES - GENERAL: PAINLEVEL: NO PAIN (0)

## 2018-06-18 NOTE — PROGRESS NOTES
SUBJECTIVE:                                                    Meredith Gordon is a 28 year old female who presents to clinic today for the following health issues:      Hypothyroidism Follow-up      Since last visit, patient describes the following symptoms: constipation, loose stools and fatigue      Amount of exercise or physical activity: 4-5 days/week for an average of 45-60 minutes    Problems taking medications regularly: No    Medication side effects: none    Diet: regular (no restrictions)        PCOS      Duration: many years    Description (location/character/radiation): none    Intensity:  none    Accompanying signs and symptoms: irregular periods    History (similar episodes/previous evaluation): None    Precipitating or alleviating factors: None    Therapies tried and outcome: None       Problem list and histories reviewed & adjusted, as indicated.  Additional history: as documented    Patient Active Problem List   Diagnosis     PCOS (polycystic ovarian syndrome)     ACP (advance care planning)     Hypothyroidism due to Hashimoto's thyroiditis     Past Surgical History:   Procedure Laterality Date     MAMMOPLASTY REDUCTION  2015     wisdom teeth         Social History   Substance Use Topics     Smoking status: Never Smoker     Smokeless tobacco: Never Used     Alcohol use 0.0 oz/week     0 Standard drinks or equivalent per week      Comment: 1/week     Family History   Problem Relation Age of Onset     Thyroid Disease Mother      hypothyroidism     Nephrolithiasis Father      Other - See Comments Sister      pcos     Family History Negative Sister      Family History Negative Brother      Family History Negative Brother      Family History Negative Brother      Family History Negative Sister      Hypothyroidism Maternal Grandmother      Rheumatoid Arthritis Maternal Grandfather      DIABETES Paternal Grandfather      type 2     Hypertension Paternal Grandfather          Current Outpatient  Prescriptions   Medication Sig Dispense Refill     ARMOUR THYROID 90 MG TABS Take 1 Tab by mouth two times a day. 30 tablet 0     metFORMIN (GLUCOPHAGE-XR) 500 MG 24 hr tablet Take 2 Tabs by mouth every night at bedtime. 60 tablet 5     ranitidine (ZANTAC) 300 MG tablet TAKE 1 TABLET AT BEDTIME 90 tablet 0     spironolactone (ALDACTONE) 50 MG tablet Take one tablet by mouth two times a day. 180 tablet 0     thyroid (ARMOUR THYROID) 120 MG tablet Take 1 tablet (120 mg) by mouth 2 times daily 60 tablet 1     Allergies   Allergen Reactions     Cats Itching     Cat/feline product derivatives     Labs reviewed in EPIC    ROS:  Constitutional, HEENT, cardiovascular, pulmonary, gi and gu systems are negative, except as otherwise noted.    OBJECTIVE:                                                    /86  Pulse 78  Temp 98.1  F (36.7  C) (Tympanic)  Wt 203 lb (92.1 kg)  SpO2 100%  BMI 33.27 kg/m2  Body mass index is 33.27 kg/(m^2).  GENERAL APPEARANCE: healthy, alert and no distress  NECK: no adenopathy, no asymmetry, masses, or scars and thyroid normal to palpation  RESP: lungs clear to auscultation - no rales, rhonchi or wheezes  CV: regular rates and rhythm, normal S1 S2, no S3 or S4 and no murmur, click or rub  ABDOMEN: soft, nontender, without hepatosplenomegaly or masses and bowel sounds normal  PSYCH: mentation appears normal and affect normal/bright       ASSESSMENT/PLAN:                                                    1. Hypothyroidism due to Hashimoto's thyroiditis  Had low level last September, was to return for labs in November  Suspect she is still low,   - TSH with free T4 reflex  - thyroid (ARMOUR THYROID) 120 MG tablet; Take 1 tablet (120 mg) by mouth 2 times daily  Dispense: 60 tablet; Refill: 1  - TSH with free T4 reflex; Future    2. Fatigue, unspecified type  Will check on this, she reports she is eating lots of greens  - Iron and iron binding capacity  - T4 free    3. PCOS (polycystic  ovarian syndrome)  Cycles still are irregular    Patient was agreeable to this plan and had no further questions.  See Patient Instructions    Jovana Lambert MD  [facility]

## 2018-06-18 NOTE — NURSING NOTE
"Chief Complaint   Patient presents with     RECHECK     medications       Initial /86  Pulse 78  Temp 98.1  F (36.7  C) (Tympanic)  Wt 203 lb (92.1 kg)  SpO2 100%  BMI 33.27 kg/m2 Estimated body mass index is 33.27 kg/(m^2) as calculated from the following:    Height as of 9/20/17: 5' 5.5\" (1.664 m).    Weight as of this encounter: 203 lb (92.1 kg).  Medication Reconciliation: complete    Jahaira Argueta MA    "

## 2018-06-18 NOTE — MR AVS SNAPSHOT
After Visit Summary   6/18/2018    Meredith Gordon    MRN: [id number]           Patient Information     Date Of Birth          1990        Visit Information        Provider Department      6/18/2018 11:15 AM Jovana Lambert MD [facility]        Today's Diagnoses     Fatigue, unspecified type    -  1    Hypothyroidism due to Hashimoto's thyroiditis        PCOS (polycystic ovarian syndrome)           Follow-ups after your visit        Future tests that were ordered for you today     Open Future Orders        Priority Expected Expires Ordered    TSH with free T4 reflex Routine 8/6/2018 6/18/2019 6/18/2018            Who to contact     If you have questions or need follow up information about today's clinic visit or your schedule please contact [facility] directly at [phone].  Normal or non-critical lab and imaging results will be communicated to you by MyChart, letter or phone within 4 business days after the clinic has received the results. If you do not hear from us within 7 days, please contact the clinic through [employer] or phone. If you have a critical or abnormal lab result, we will notify you by phone as soon as possible.  Submit refill requests through [employer] or call your pharmacy and they will forward the refill request to us. Please allow 3 business days for your refill to be completed.          Additional Information About Your Visit        MyChart Information     [employer] gives you secure access to your electronic health record. If you see a primary care provider, you can also send messages to your care team and make appointments. If you have questions, please call your primary care clinic.  If you do not have a primary care provider, please call [phone] and they will assist you.        Care EveryWhere ID     This is your Care EveryWhere ID. This could be used by other organizations to access your [facility] medical records  [id number]        Your  Vitals Were     Pulse Temperature Pulse Oximetry BMI (Body Mass Index)          78 98.1  F (36.7  C) (Tympanic) 100% 33.27 kg/m2         Blood Pressure from Last 3 Encounters:   06/18/18 124/86   09/20/17 118/72   04/20/17 108/78    Weight from Last 3 Encounters:   06/18/18 203 lb (92.1 kg)   09/20/17 199 lb (90.3 kg)   04/20/17 190 lb (86.2 kg)              We Performed the Following     Iron and iron binding capacity     T4 free     TSH with free T4 reflex          Today's Medication Changes          These changes are accurate as of 6/18/18  1:13 PM.  If you have any questions, ask your nurse or doctor.               These medicines have changed or have updated prescriptions.        Dose/Directions    * ARMOUR THYROID 90 MG Tabs   This may have changed:  Another medication with the same name was added. Make sure you understand how and when to take each.   Used for:  Hypothyroidism due to Hashimoto's thyroiditis   Generic drug:  Thyroid   Changed by:  Jovana Lambert MD        Take 1 Tab by mouth two times a day.   Quantity:  30 tablet   Refills:  0       * thyroid 120 MG tablet   Commonly known as:  ARMOUR THYROID   This may have changed:  You were already taking a medication with the same name, and this prescription was added. Make sure you understand how and when to take each.   Used for:  Hypothyroidism due to Hashimoto's thyroiditis   Changed by:  Jovana Lambert MD        Dose:  120 mg   Take 1 tablet (120 mg) by mouth 2 times daily   Quantity:  60 tablet   Refills:  1       * Notice:  This list has 2 medication(s) that are the same as other medications prescribed for you. Read the directions carefully, and ask your doctor or other care provider to review them with you.         Where to get your medicines      These medications were sent to [facility] [place] [address] AT [address] [address]     Phone:  [phone]      thyroid 120 MG tablet                Primary Care Provider Office Phone # Fax #    Jovana Lambert [phone]       [facility] [place] [address]  [facility] [address]        Equal Access to Services     [employer]LE [facility] : Hadii elana ku hadyayoo Soomaali, waaxda luqadaha, qaybta kaalmada adeegyada, dewey burksn kwabena batista abhay soto. So [facility] [phone].    ATENCIÓN: Si habla español, tiene a leija disposición servicios gratuitos de asistencia lingüística. Llame al [phone].    We comply with applicable federal civil rights laws and [place] laws. We do not discriminate on the basis of race, color, national origin, age, disability, sex, sexual orientation, or gender identity.            Thank you!     Thank you for choosing [facility]  for your care. Our goal is always to provide you with excellent care. Hearing back from our patients is one way we can continue to improve our services. Please take a few minutes to complete the written survey that you may receive in the mail after your visit with us. Thank you!             Your Updated Medication List - Protect others around you: Learn how to safely use, store and throw away your medicines at www.disposemymeds.org.          This list is accurate as of 6/18/18  1:13 PM.  Always use your most recent med list.                   Brand Name Dispense Instructions for use Diagnosis    * ARMOUR THYROID 90 MG Tabs   Generic drug:  Thyroid     30 tablet    Take 1 Tab by mouth two times a day.    Hypothyroidism due to Hashimoto's thyroiditis       * thyroid 120 MG tablet    ARMOUR THYROID    60 tablet    Take 1 tablet (120 mg) by mouth 2 times daily    Hypothyroidism due to Hashimoto's thyroiditis       metFORMIN 500 MG 24 hr tablet    GLUCOPHAGE-XR    60 tablet    Take 2 Tabs by mouth every night at bedtime.    PCOS (polycystic ovarian syndrome)       ranitidine 300 MG tablet    ZANTAC    90 tablet    TAKE 1 TABLET AT BEDTIME     Gastroesophageal reflux disease, esophagitis presence not specified       spironolactone 50 MG tablet    ALDACTONE    180 tablet    Take one tablet by mouth two times a day.    PCOS (polycystic ovarian syndrome), Other acne       * Notice:  This list has 2 medication(s) that are the same as other medications prescribed for you. Read the directions carefully, and ask your doctor or other care provider to review them with you.

## 2018-06-19 ASSESSMENT — PATIENT HEALTH QUESTIONNAIRE - PHQ9: SUM OF ALL RESPONSES TO PHQ QUESTIONS 1-9: 4

## 2018-06-19 ASSESSMENT — ANXIETY QUESTIONNAIRES: GAD7 TOTAL SCORE: 0

## 2018-09-04 DIAGNOSIS — E06.3 HYPOTHYROIDISM DUE TO HASHIMOTO'S THYROIDITIS: ICD-10-CM

## 2018-09-04 LAB
T4 FREE SERPL-MCNC: 1.24 NG/DL (ref 0.76–1.46)
TSH SERPL DL<=0.005 MIU/L-ACNC: <0.01 MU/L (ref 0.4–4)

## 2018-09-04 PROCEDURE — 84439 ASSAY OF FREE THYROXINE: CPT | Performed by: FAMILY MEDICINE

## 2018-09-04 PROCEDURE — 84443 ASSAY THYROID STIM HORMONE: CPT | Performed by: FAMILY MEDICINE

## 2018-09-04 PROCEDURE — 36415 COLL VENOUS BLD VENIPUNCTURE: CPT | Performed by: FAMILY MEDICINE

## 2018-09-05 NOTE — TELEPHONE ENCOUNTER
Jud thyroid      Last Written Prescription Date:  5/24/18  Last Fill Quantity: 30,   # refills: 0  Last Office Visit: 6/18/18  Future Office visit:   none

## 2018-09-06 RX ORDER — THYROID, PORCINE 120 MG/1
TABLET ORAL
Qty: 60 TABLET | Refills: 1 | Status: SHIPPED | OUTPATIENT
Start: 2018-09-06 | End: 2018-11-09

## 2018-11-16 NOTE — PROGRESS NOTES
"  SUBJECTIVE:   Meredith Gordon is a 28 year old female who presents to clinic today for the following health issues:    Hypothyroidism Follow-up      Since last visit, patient describes the following symptoms: Weight stable, no hair loss, no skin changes, no constipation, no loose stools    Amount of exercise or physical activity: 4-5 days/week for an average of 30-45 minutes    Problems taking medications regularly: No    Medication side effects: none    Diet: low salt    Rash      Duration: on going worse recently     Description  Location: face  Itching: moderate    Intensity:  moderate    Accompanying signs and symptoms: burn and bright red, with some dry and flaky areas    History (similar episodes/previous evaluation): was told it might be hormones    Precipitating or alleviating factors:  New exposures:  None  Recent travel: no      Therapies tried and outcome: ketoconazole cream started last week, seems to be helping redness and pain improved, mild scaling left.       Problem list and histories reviewed & adjusted, as indicated.  Additional history: as documented    Labs reviewed in EPIC    Reviewed and updated as needed this visit by clinical staff  Tobacco  Allergies  Meds  Problems  Med Hx  Surg Hx  Fam Hx  Soc Hx        Reviewed and updated as needed this visit by Provider  Allergies  Meds  Problems         ROS:  Constitutional, HEENT, cardiovascular, pulmonary, gi and gu systems are negative, except as otherwise noted.    OBJECTIVE:     /62 (BP Location: Right arm, Patient Position: Sitting, Cuff Size: Adult Regular)  Pulse 91  Temp 98  F (36.7  C)  Ht 5' 5.5\" (1.664 m)  Wt 203 lb (92.1 kg)  SpO2 97%  BMI 33.27 kg/m2  Body mass index is 33.27 kg/(m^2).  GENERAL: healthy, alert and no distress  RESP: lungs clear to auscultation - no rales, rhonchi or wheezes  CV: regular rate and rhythm, normal S1 S2, no S3 or S4, no murmur, click or rub, no peripheral edema and peripheral pulses " strong  MS: no gross musculoskeletal defects noted, no edema  SKIN: Mild erythema, flaking of skin in folds of nares and over chin.     Diagnostic Test Results:  No results found for this or any previous visit (from the past 24 hour(s)).    ASSESSMENT/PLAN:     1. Hypothyroidism due to Hashimoto's thyroiditis  On armor thyroid. Has been feeling well. Mildly low TSH with nl T4 on last labs. Due for recheck. Adjust dose of replacement as needed.   - TSH with free T4 reflex    2. Seborrheic dermatitis  Suspected, continue ketoconazole. Consider steroid if not fully resolved over the next couple of weeks.     Milka Joshi MD  [facility]

## 2018-11-21 ENCOUNTER — OFFICE VISIT (OUTPATIENT)
Dept: FAMILY MEDICINE | Facility: OTHER | Age: 28
End: 2018-11-21
Attending: FAMILY MEDICINE
Payer: COMMERCIAL

## 2018-11-21 VITALS
WEIGHT: 203 LBS | SYSTOLIC BLOOD PRESSURE: 130 MMHG | HEIGHT: 66 IN | OXYGEN SATURATION: 97 % | HEART RATE: 91 BPM | TEMPERATURE: 98 F | DIASTOLIC BLOOD PRESSURE: 62 MMHG | BODY MASS INDEX: 32.62 KG/M2

## 2018-11-21 DIAGNOSIS — E06.3 HYPOTHYROIDISM DUE TO HASHIMOTO'S THYROIDITIS: Primary | ICD-10-CM

## 2018-11-21 DIAGNOSIS — L21.9 SEBORRHEIC DERMATITIS: ICD-10-CM

## 2018-11-21 LAB
T4 FREE SERPL-MCNC: 1.11 NG/DL (ref 0.76–1.46)
TSH SERPL DL<=0.005 MIU/L-ACNC: <0.01 MU/L (ref 0.4–4)

## 2018-11-21 PROCEDURE — 99213 OFFICE O/P EST LOW 20 MIN: CPT | Performed by: FAMILY MEDICINE

## 2018-11-21 PROCEDURE — 84443 ASSAY THYROID STIM HORMONE: CPT | Performed by: FAMILY MEDICINE

## 2018-11-21 PROCEDURE — 36415 COLL VENOUS BLD VENIPUNCTURE: CPT | Performed by: FAMILY MEDICINE

## 2018-11-21 PROCEDURE — 84439 ASSAY OF FREE THYROXINE: CPT | Performed by: FAMILY MEDICINE

## 2018-11-21 ASSESSMENT — ANXIETY QUESTIONNAIRES
1. FEELING NERVOUS, ANXIOUS, OR ON EDGE: NOT AT ALL
6. BECOMING EASILY ANNOYED OR IRRITABLE: NOT AT ALL
IF YOU CHECKED OFF ANY PROBLEMS ON THIS QUESTIONNAIRE, HOW DIFFICULT HAVE THESE PROBLEMS MADE IT FOR YOU TO DO YOUR WORK, TAKE CARE OF THINGS AT HOME, OR GET ALONG WITH OTHER PEOPLE: NOT DIFFICULT AT ALL
2. NOT BEING ABLE TO STOP OR CONTROL WORRYING: NOT AT ALL
GAD7 TOTAL SCORE: 0
7. FEELING AFRAID AS IF SOMETHING AWFUL MIGHT HAPPEN: NOT AT ALL
4. TROUBLE RELAXING: NOT AT ALL
3. WORRYING TOO MUCH ABOUT DIFFERENT THINGS: NOT AT ALL
5. BEING SO RESTLESS THAT IT IS HARD TO SIT STILL: NOT AT ALL

## 2018-11-21 ASSESSMENT — PAIN SCALES - GENERAL: PAINLEVEL: NO PAIN (0)

## 2018-11-21 ASSESSMENT — PATIENT HEALTH QUESTIONNAIRE - PHQ9: SUM OF ALL RESPONSES TO PHQ QUESTIONS 1-9: 3

## 2018-11-21 NOTE — MR AVS SNAPSHOT
"              After Visit Summary   11/21/2018    Meredith Gordon    MRN: [id number]           Patient Information     Date Of Birth          1990        Visit Information        Provider Department      11/21/2018 2:30 PM Milka Joshi MD [facility]        Today's Diagnoses     Hypothyroidism due to Hashimoto's thyroiditis    -  1    Seborrheic dermatitis           Follow-ups after your visit        Who to contact     If you have questions or need follow up information about today's clinic visit or your schedule please contact [facility] directly at [phone].  Normal or non-critical lab and imaging results will be communicated to you by MyChart, letter or phone within 4 business days after the clinic has received the results. If you do not hear from us within 7 days, please contact the clinic through [employer] or phone. If you have a critical or abnormal lab result, we will notify you by phone as soon as possible.  Submit refill requests through [employer] or call your pharmacy and they will forward the refill request to us. Please allow 3 business days for your refill to be completed.          Additional Information About Your Visit        MyChart Information     [employer] gives you secure access to your electronic health record. If you see a primary care provider, you can also send messages to your care team and make appointments. If you have questions, please call your primary care clinic.  If you do not have a primary care provider, please call [phone] and they will assist you.        Care EveryWhere ID     This is your Care EveryWhere ID. This could be used by other organizations to access your [facility] medical records  [id number]        Your Vitals Were     Pulse Temperature Height Pulse Oximetry BMI (Body Mass Index)       91 98  F (36.7  C) 5' 5.5\" (1.664 m) 97% 33.27 kg/m2        Blood Pressure from Last 3 Encounters:   11/21/18 130/62   06/18/18 " 124/86   09/20/17 118/72    Weight from Last 3 Encounters:   11/21/18 203 lb (92.1 kg)   06/18/18 203 lb (92.1 kg)   09/20/17 199 lb (90.3 kg)              We Performed the Following     TSH with free T4 reflex          Today's Medication Changes          These changes are accurate as of 11/21/18  3:01 PM.  If you have any questions, ask your nurse or doctor.               These medicines have changed or have updated prescriptions.        Dose/Directions    ARMOUR THYROID 120 MG tablet   This may have changed:  Another medication with the same name was removed. Continue taking this medication, and follow the directions you see here.   Used for:  Hypothyroidism due to Hashimoto's thyroiditis   Generic drug:  thyroid   Changed by:  Milka Joshi MD        Take 1 tablet by mouth two times a day.   Quantity:  60 tablet   Refills:  1                Primary Care Provider Office Phone # Fax #    Jovana Lambert [phone]       [facility] [place] [address] [facility] [address]        Equal Access to Services     [facility]: Hadii elana ku hadasho Soomaali, waaxda luqadaha, qaybta kaalmada adeegyada, waxay davidin hayaimee blank . So [facility] [phone].    ATENCIÓN: Si habla español, tiene a leija disposición servicios gratuitos de asistencia lingüística. Llame al [phone].    We comply with applicable federal civil rights laws and [place] laws. We do not discriminate on the basis of race, color, national origin, age, disability, sex, sexual orientation, or gender identity.            Thank you!     Thank you for choosing [facility]  for your care. Our goal is always to provide you with excellent care. Hearing back from our patients is one way we can continue to improve our services. Please take a few minutes to complete the written survey that you may receive in the mail after your visit with us. Thank you!             Your Updated Medication List -  Protect others around you: Learn how to safely use, store and throw away your medicines at www.disposemymeds.org.          This list is accurate as of 11/21/18  3:01 PM.  Always use your most recent med list.                   Brand Name Dispense Instructions for use Diagnosis    ARMOUR THYROID 120 MG tablet   Generic drug:  thyroid     60 tablet    Take 1 tablet by mouth two times a day.    Hypothyroidism due to Hashimoto's thyroiditis       ketoconazole 2 % cream    NIZORAL    30 g    Apply topically 2 times daily    Seborrheic dermatitis       metFORMIN 500 MG 24 hr tablet    GLUCOPHAGE-XR    60 tablet    Take 2 Tabs by mouth every night at bedtime.    PCOS (polycystic ovarian syndrome)       ranitidine 300 MG tablet    ZANTAC    90 tablet    TAKE 1 TABLET AT BEDTIME    Gastroesophageal reflux disease, esophagitis presence not specified       spironolactone 50 MG tablet    ALDACTONE    180 tablet    TAKE 1 TABLET TWICE A DAY    PCOS (polycystic ovarian syndrome), Other acne

## 2018-11-21 NOTE — NURSING NOTE
"Chief Complaint   Patient presents with     Thyroid Problem     Derm Problem     rash on face       Initial /62 (BP Location: Right arm, Patient Position: Sitting, Cuff Size: Adult Regular)  Pulse 91  Temp 98  F (36.7  C)  Ht 5' 5.5\" (1.664 m)  Wt 203 lb (92.1 kg)  SpO2 97%  BMI 33.27 kg/m2 Estimated body mass index is 33.27 kg/(m^2) as calculated from the following:    Height as of this encounter: 5' 5.5\" (1.664 m).    Weight as of this encounter: 203 lb (92.1 kg).  Medication Reconciliation: complete    Sheron Salas LPN  "

## 2018-11-22 ASSESSMENT — ANXIETY QUESTIONNAIRES: GAD7 TOTAL SCORE: 0

## 2019-03-13 DIAGNOSIS — E06.3 HYPOTHYROIDISM DUE TO HASHIMOTO'S THYROIDITIS: ICD-10-CM

## 2019-03-13 NOTE — TELEPHONE ENCOUNTER
DYAN THYROID 120 MG tablet  Last Written Prescription Date:  2/11/19  Last Fill Quantity: 60,   # refills: 0  Last Office Visit: 11/21/18  Future Office visit:

## 2019-03-14 RX ORDER — THYROID 120 MG/1
TABLET ORAL
Qty: 60 TABLET | Refills: 2 | Status: SHIPPED | OUTPATIENT
Start: 2019-03-14 | End: 2019-07-01

## 2019-03-21 DIAGNOSIS — E06.3 HYPOTHYROIDISM DUE TO HASHIMOTO'S THYROIDITIS: ICD-10-CM

## 2019-03-21 NOTE — TELEPHONE ENCOUNTER
Jud thyroid 120 mg      Last Written Prescription Date:  3/14/2019  Last Fill Quantity: 60,   # refills: 2  Last Office Visit: 11/21/2018  Future Office visit:

## 2019-03-22 RX ORDER — THYROID, PORCINE 120 MG/1
TABLET ORAL
Qty: 60 TABLET | Refills: 0 | OUTPATIENT
Start: 2019-03-22

## 2019-04-18 DIAGNOSIS — L21.9 SEBORRHEIC DERMATITIS: ICD-10-CM

## 2019-04-18 NOTE — TELEPHONE ENCOUNTER
ketoconazole (NIZORAL) 2 % external cream      Last Written Prescription Date:  1/9/19  Last Fill Quantity: 30g,   # refills: 1  Last Office Visit: 11/21/18  Future Office visit:

## 2019-04-22 RX ORDER — KETOCONAZOLE 20 MG/G
CREAM TOPICAL 2 TIMES DAILY
Qty: 30 G | Refills: 1 | Status: SHIPPED | OUTPATIENT
Start: 2019-04-22 | End: 2019-07-01

## 2019-05-13 DIAGNOSIS — E28.2 PCOS (POLYCYSTIC OVARIAN SYNDROME): ICD-10-CM

## 2019-05-13 DIAGNOSIS — L70.8 OTHER ACNE: ICD-10-CM

## 2019-05-13 DIAGNOSIS — K21.9 GASTROESOPHAGEAL REFLUX DISEASE, ESOPHAGITIS PRESENCE NOT SPECIFIED: ICD-10-CM

## 2019-05-14 ENCOUNTER — TELEPHONE (OUTPATIENT)
Dept: FAMILY MEDICINE | Facility: OTHER | Age: 29
End: 2019-05-14

## 2019-05-14 RX ORDER — SPIRONOLACTONE 50 MG/1
TABLET, FILM COATED ORAL
Qty: 120 TABLET | Refills: 0 | Status: SHIPPED | OUTPATIENT
Start: 2019-05-14 | End: 2020-02-04

## 2019-05-14 NOTE — TELEPHONE ENCOUNTER
Writer called and left message for patient to schedule follow up with PCP after 6/18/19 to include labs.

## 2019-05-14 NOTE — TELEPHONE ENCOUNTER
spironolactone (ALDACTONE) 50 MG tablet      Last Written Prescription Date:  8/20/18  Last Fill Quantity: 180,   # refills: 0  Last Office Visit: 6/18/18  Future Office visit:       Routing refill request to provider for review/approval because:   Normal serum creatinine on file in past 12 months    Normal serum potassium on file in past 12 months    Normal serum sodium on file in past 12 months     Pt is taking this med for PCOS. Not sure if you want labs ordered or not. Please advise. Thank you!

## 2019-05-14 NOTE — TELEPHONE ENCOUNTER
----- Message from Philip Mathis MD sent at 5/14/2019  9:51 AM CDT -----  Needs f/u and labs after June 18th/ less than 2 months  with KB

## 2019-06-14 PROBLEM — E78.5 HYPERLIPIDEMIA LDL GOAL <130: Status: ACTIVE | Noted: 2019-06-14

## 2019-06-14 PROBLEM — E61.1 IRON DEFICIENCY: Status: ACTIVE | Noted: 2019-06-14

## 2019-06-20 ENCOUNTER — MYC MEDICAL ADVICE (OUTPATIENT)
Dept: FAMILY MEDICINE | Facility: OTHER | Age: 29
End: 2019-06-20

## 2019-06-25 NOTE — PROGRESS NOTES
Subjective     Meredith Gordon is a 29 year old female who presents to clinic today for the following health issues:    HPI   Hypothyroidism Follow-up      Since last visit, patient describes the following symptoms: weight gain of 10 lbs      Amount of exercise or physical activity: 4-5 days/week for an average of 45-60 minutes    Problems taking medications regularly: No    Medication side effects: none    Diet: regular (no restrictions)      PCOS      Duration: Follow up     Description (location/character/radiation): medication recheck- metformin     Intensity:  mild    Accompanying signs and symptoms: none    History (similar episodes/previous evaluation): yes    Precipitating or alleviating factors: None    Therapies tried and outcome: metformin      Weight Gain      Duration: 1-2 years    Description (location/character/radiation): has had stress at work    Intensity:  mild, moderate    Accompanying signs and symptoms: using fitbit jessica and avoiding processed food    History (similar episodes/previous evaluation): exercising daily    Precipitating or alleviating factors: None    Therapies tried and outcome: above       Patient Active Problem List   Diagnosis     PCOS (polycystic ovarian syndrome)     ACP (advance care planning)     Hypothyroidism due to Hashimoto's thyroiditis     Hyperlipidemia LDL goal <130     Iron deficiency     Rosacea     Enlarged lymph nodes     Sebaceous cyst     Weight gain     Past Surgical History:   Procedure Laterality Date     MAMMOPLASTY REDUCTION  2015     wisdom teeth         Social History     Tobacco Use     Smoking status: Never Smoker     Smokeless tobacco: Never Used   Substance Use Topics     Alcohol use: Yes     Alcohol/week: 0.0 oz     Frequency: Monthly or less     Comment: Occasional     Family History   Problem Relation Age of Onset     Thyroid Disease Mother         hypothyroidism     Nephrolithiasis Father      Other - See Comments Sister         pcos     Family  History Negative Sister      Family History Negative Brother      Family History Negative Brother      Family History Negative Brother      Family History Negative Sister      Hypothyroidism Maternal Grandmother      Thyroid Disease Maternal Grandmother      Rheumatoid Arthritis Maternal Grandfather      Diabetes Paternal Grandfather         type 2     Hypertension Paternal Grandfather          Current Outpatient Medications   Medication Sig Dispense Refill     acetylcysteine (N-ACETYL CYSTEINE) 600 MG CAPS capsule Take 1 capsule (600 mg) by mouth daily 90 capsule 1     metFORMIN (GLUCOPHAGE-XR) 500 MG 24 hr tablet Take 2 Tabs by mouth at bedtime. 60 tablet 5     metroNIDAZOLE (METROGEL) 0.75 % external gel Apply topically 2 times daily 45 g 1     ranitidine (ZANTAC) 300 MG tablet TAKE 1 TABLET AT BEDTIME 90 tablet 0     spironolactone (ALDACTONE) 50 MG tablet TAKE 1 TABLET TWICE A  tablet 0     thyroid (ARMOUR THYROID) 120 MG tablet Take 1 tablet by mouth two times a day. 60 tablet 2     Allergies   Allergen Reactions     Cats Itching     Cat/feline product derivatives     BP Readings from Last 3 Encounters:   07/01/19 113/76   11/21/18 130/62   06/18/18 124/86    Wt Readings from Last 3 Encounters:   07/01/19 97.1 kg (214 lb)   11/21/18 92.1 kg (203 lb)   06/18/18 92.1 kg (203 lb)      Reviewed and updated as needed this visit by Provider         Review of Systems   ROS COMP: Constitutional, HEENT, cardiovascular, pulmonary, gi and gu systems are negative, except as otherwise noted.      Objective    /76 (BP Location: Left arm, Patient Position: Chair, Cuff Size: Adult Large)   Pulse 98   Temp 98.2  F (36.8  C) (Tympanic)   Wt 97.1 kg (214 lb)   SpO2 99%   BMI 35.07 kg/m    There is no height or weight on file to calculate BMI.  Physical Exam   GENERAL: healthy, alert and no distress  EYES: Eyes grossly normal to inspection, PERRL and conjunctivae and sclerae normal  HENT: ear canals and TM's  normal, nose and mouth without ulcers or lesions  NECK: no asymmetry, masses, or scars, thyroid normal to palpation and enlarged lymph node under right ear  RESP: lungs clear to auscultation - no rales, rhonchi or wheezes  BREAST: normal without masses, tenderness or nipple discharge and no palpable axillary masses or adenopathy  CV: regular rate and rhythm, normal S1 S2, no S3 or S4, no murmur, click or rub, no peripheral edema and peripheral pulses strong  ABDOMEN: soft, nontender, no hepatosplenomegaly, no masses and bowel sounds normal  MS: no gross musculoskeletal defects noted, no edema  SKIN: no suspicious lesions or rashes  NEURO: Normal strength and tone, mentation intact and speech normal  PSYCH: mentation appears normal, affect normal/bright    Diagnostic Test Results:  Labs reviewed in Epic  Results for orders placed or performed in visit on 11/21/18   TSH with free T4 reflex   Result Value Ref Range    TSH <0.01 (L) 0.40 - 4.00 mU/L   T4 free   Result Value Ref Range    T4 Free 1.11 0.76 - 1.46 ng/dL           Assessment & Plan     (R53.83) Other fatigue  (primary encounter diagnosis)  Comment:   Plan: Vitamin B12          (E28.2) PCOS (polycystic ovarian syndrome)  Comment:   Plan: acetylcysteine (N-ACETYL CYSTEINE) 600 MG CAPS         capsule        Start NAC daily  Follow-up 3 mos  May not be ovulating second to irregular bleeding with stress    (L71.9) Rosacea  Comment:   Plan: metroNIDAZOLE (METROGEL) 0.75 % external gel        Trial for a few weeks and if not helpful, will trial steroid    (R59.9) Enlarged lymph nodes  Comment:   Plan: GENERAL SURG ADULT REFERRAL        Gets larger and smaller -- lymph node vs sebaceous cyst    (L72.3) Sebaceous cyst  Comment:   Plan: GENERAL SURG ADULT REFERRAL          (R63.5) Weight gain  Comment:   Plan: discussed at length       Patient was agreeable to this plan and had no further questions.  Patient Instructions   1.  Avoid plastics  2.  intermittant  fasting -- 12-16 hrs every night  3.  My fitness pal jessica --   4.  Omid Whitehead MD    5.  Karla cordova  6.  Take iron daily  7.  Greek yogurt  8.  Calcium/magnesium/zinc  9.  Start NAC        No follow-ups on file.    Jovana Lambert MD  [facility]

## 2019-07-01 ENCOUNTER — OFFICE VISIT (OUTPATIENT)
Dept: FAMILY MEDICINE | Facility: OTHER | Age: 29
End: 2019-07-01
Attending: FAMILY MEDICINE
Payer: COMMERCIAL

## 2019-07-01 VITALS
DIASTOLIC BLOOD PRESSURE: 76 MMHG | SYSTOLIC BLOOD PRESSURE: 113 MMHG | HEART RATE: 98 BPM | WEIGHT: 214 LBS | OXYGEN SATURATION: 99 % | BODY MASS INDEX: 35.07 KG/M2 | TEMPERATURE: 98.2 F

## 2019-07-01 DIAGNOSIS — E28.2 PCOS (POLYCYSTIC OVARIAN SYNDROME): ICD-10-CM

## 2019-07-01 DIAGNOSIS — R59.9 ENLARGED LYMPH NODES: ICD-10-CM

## 2019-07-01 DIAGNOSIS — R53.83 OTHER FATIGUE: Primary | ICD-10-CM

## 2019-07-01 DIAGNOSIS — L71.9 ROSACEA: ICD-10-CM

## 2019-07-01 DIAGNOSIS — L72.3 SEBACEOUS CYST: ICD-10-CM

## 2019-07-01 DIAGNOSIS — R63.5 WEIGHT GAIN: ICD-10-CM

## 2019-07-01 DIAGNOSIS — E06.3 HYPOTHYROIDISM DUE TO HASHIMOTO'S THYROIDITIS: ICD-10-CM

## 2019-07-01 LAB
ALBUMIN SERPL-MCNC: 3.7 G/DL (ref 3.4–5)
ALP SERPL-CCNC: 96 U/L (ref 40–150)
ALT SERPL W P-5'-P-CCNC: 27 U/L (ref 0–50)
ANION GAP SERPL CALCULATED.3IONS-SCNC: 6 MMOL/L (ref 3–14)
AST SERPL W P-5'-P-CCNC: 15 U/L (ref 0–45)
BILIRUB SERPL-MCNC: 0.5 MG/DL (ref 0.2–1.3)
BUN SERPL-MCNC: 11 MG/DL (ref 7–30)
CALCIUM SERPL-MCNC: 9.4 MG/DL (ref 8.5–10.1)
CHLORIDE SERPL-SCNC: 106 MMOL/L (ref 94–109)
CHOLEST SERPL-MCNC: 185 MG/DL
CO2 SERPL-SCNC: 26 MMOL/L (ref 20–32)
CREAT SERPL-MCNC: 0.71 MG/DL (ref 0.52–1.04)
GFR SERPL CREATININE-BSD FRML MDRD: >90 ML/MIN/{1.73_M2}
GLUCOSE SERPL-MCNC: 81 MG/DL (ref 70–99)
HDLC SERPL-MCNC: 63 MG/DL
IRON SATN MFR SERPL: 7 % (ref 15–46)
IRON SERPL-MCNC: 26 UG/DL (ref 35–180)
LDLC SERPL CALC-MCNC: 99 MG/DL
NONHDLC SERPL-MCNC: 122 MG/DL
POTASSIUM SERPL-SCNC: 4 MMOL/L (ref 3.4–5.3)
PROT SERPL-MCNC: 7.8 G/DL (ref 6.8–8.8)
SODIUM SERPL-SCNC: 138 MMOL/L (ref 133–144)
T4 FREE SERPL-MCNC: 0.9 NG/DL (ref 0.76–1.46)
TIBC SERPL-MCNC: 398 UG/DL (ref 240–430)
TRIGL SERPL-MCNC: 117 MG/DL
TSH SERPL DL<=0.005 MIU/L-ACNC: <0.01 MU/L (ref 0.4–4)
VIT B12 SERPL-MCNC: 553 PG/ML (ref 193–986)

## 2019-07-01 PROCEDURE — 36415 COLL VENOUS BLD VENIPUNCTURE: CPT | Performed by: FAMILY MEDICINE

## 2019-07-01 PROCEDURE — 83550 IRON BINDING TEST: CPT | Performed by: FAMILY MEDICINE

## 2019-07-01 PROCEDURE — 99214 OFFICE O/P EST MOD 30 MIN: CPT | Performed by: FAMILY MEDICINE

## 2019-07-01 PROCEDURE — 84443 ASSAY THYROID STIM HORMONE: CPT | Performed by: FAMILY MEDICINE

## 2019-07-01 PROCEDURE — 82607 VITAMIN B-12: CPT | Mod: 90 | Performed by: FAMILY MEDICINE

## 2019-07-01 PROCEDURE — 84439 ASSAY OF FREE THYROXINE: CPT | Performed by: FAMILY MEDICINE

## 2019-07-01 PROCEDURE — 80053 COMPREHEN METABOLIC PANEL: CPT | Performed by: FAMILY MEDICINE

## 2019-07-01 PROCEDURE — 80061 LIPID PANEL: CPT | Performed by: FAMILY MEDICINE

## 2019-07-01 PROCEDURE — 99000 SPECIMEN HANDLING OFFICE-LAB: CPT | Performed by: FAMILY MEDICINE

## 2019-07-01 PROCEDURE — 83540 ASSAY OF IRON: CPT | Performed by: FAMILY MEDICINE

## 2019-07-01 RX ORDER — METRONIDAZOLE 7.5 MG/G
GEL TOPICAL 2 TIMES DAILY
Qty: 45 G | Refills: 1 | Status: SHIPPED | OUTPATIENT
Start: 2019-07-01 | End: 2019-11-11

## 2019-07-01 RX ORDER — METFORMIN HCL 500 MG
TABLET, EXTENDED RELEASE 24 HR ORAL
Qty: 180 TABLET | Refills: 3 | Status: SHIPPED | OUTPATIENT
Start: 2019-07-01 | End: 2020-07-02

## 2019-07-01 RX ORDER — THYROID 120 MG/1
TABLET ORAL
Qty: 60 TABLET | Refills: 2 | Status: SHIPPED | OUTPATIENT
Start: 2019-07-01 | End: 2019-10-17 | Stop reason: DRUGHIGH

## 2019-07-01 SDOH — SOCIAL STABILITY: SOCIAL INSECURITY
WITHIN THE LAST YEAR, HAVE YOU BEEN RAPED OR FORCED TO HAVE ANY KIND OF SEXUAL ACTIVITY BY YOUR PARTNER OR EX-PARTNER?: NO

## 2019-07-01 SDOH — SOCIAL STABILITY: SOCIAL INSECURITY: WITHIN THE LAST YEAR, HAVE YOU BEEN AFRAID OF YOUR PARTNER OR EX-PARTNER?: NO

## 2019-07-01 SDOH — SOCIAL STABILITY: SOCIAL INSECURITY
WITHIN THE LAST YEAR, HAVE YOU BEEN KICKED, HIT, SLAPPED, OR OTHERWISE PHYSICALLY HURT BY YOUR PARTNER OR EX-PARTNER?: NO

## 2019-07-01 SDOH — HEALTH STABILITY: PHYSICAL HEALTH: ON AVERAGE, HOW MANY DAYS PER WEEK DO YOU ENGAGE IN MODERATE TO STRENUOUS EXERCISE (LIKE A BRISK WALK)?: 6 DAYS

## 2019-07-01 SDOH — HEALTH STABILITY: MENTAL HEALTH: HOW OFTEN DO YOU HAVE A DRINK CONTAINING ALCOHOL?: MONTHLY OR LESS

## 2019-07-01 SDOH — HEALTH STABILITY: PHYSICAL HEALTH: ON AVERAGE, HOW MANY MINUTES DO YOU ENGAGE IN EXERCISE AT THIS LEVEL?: 40 MIN

## 2019-07-01 SDOH — SOCIAL STABILITY: SOCIAL INSECURITY: WITHIN THE LAST YEAR, HAVE YOU BEEN HUMILIATED OR EMOTIONALLY ABUSED IN OTHER WAYS BY YOUR PARTNER OR EX-PARTNER?: NO

## 2019-07-01 ASSESSMENT — ANXIETY QUESTIONNAIRES
2. NOT BEING ABLE TO STOP OR CONTROL WORRYING: NOT AT ALL
5. BEING SO RESTLESS THAT IT IS HARD TO SIT STILL: NOT AT ALL
3. WORRYING TOO MUCH ABOUT DIFFERENT THINGS: NOT AT ALL
6. BECOMING EASILY ANNOYED OR IRRITABLE: SEVERAL DAYS
GAD7 TOTAL SCORE: 2
IF YOU CHECKED OFF ANY PROBLEMS ON THIS QUESTIONNAIRE, HOW DIFFICULT HAVE THESE PROBLEMS MADE IT FOR YOU TO DO YOUR WORK, TAKE CARE OF THINGS AT HOME, OR GET ALONG WITH OTHER PEOPLE: NOT DIFFICULT AT ALL
4. TROUBLE RELAXING: SEVERAL DAYS
1. FEELING NERVOUS, ANXIOUS, OR ON EDGE: NOT AT ALL
7. FEELING AFRAID AS IF SOMETHING AWFUL MIGHT HAPPEN: NOT AT ALL

## 2019-07-01 ASSESSMENT — PATIENT HEALTH QUESTIONNAIRE - PHQ9: SUM OF ALL RESPONSES TO PHQ QUESTIONS 1-9: 8

## 2019-07-01 ASSESSMENT — PAIN SCALES - GENERAL: PAINLEVEL: NO PAIN (0)

## 2019-07-01 NOTE — PATIENT INSTRUCTIONS
1.  Avoid plastics  2.  intermittant fasting -- 12-16 hrs every night  3.  My fitness pal jessica --   4.  Omid Whitehead MD    5.  Karla cordova  6.  Take iron daily  7.  Greek yogurt  8.  Calcium/magnesium/zinc  9.  Start NAC

## 2019-07-01 NOTE — NURSING NOTE
"Chief Complaint   Patient presents with     Thyroid Problem     Other     PCOS       Initial /76 (BP Location: Left arm, Patient Position: Chair, Cuff Size: Adult Large)   Pulse 98   Temp 98.2  F (36.8  C) (Tympanic)   Wt 97.1 kg (214 lb)   SpO2 99%   BMI 35.07 kg/m   Estimated body mass index is 35.07 kg/m  as calculated from the following:    Height as of 11/21/18: 1.664 m (5' 5.5\").    Weight as of this encounter: 97.1 kg (214 lb).  Medication Reconciliation: complete  "

## 2019-07-02 ASSESSMENT — ANXIETY QUESTIONNAIRES: GAD7 TOTAL SCORE: 2

## 2019-07-05 ENCOUNTER — OFFICE VISIT (OUTPATIENT)
Dept: SURGERY | Facility: OTHER | Age: 29
End: 2019-07-05
Attending: FAMILY MEDICINE
Payer: COMMERCIAL

## 2019-07-05 VITALS
DIASTOLIC BLOOD PRESSURE: 84 MMHG | OXYGEN SATURATION: 98 % | BODY MASS INDEX: 36.06 KG/M2 | SYSTOLIC BLOOD PRESSURE: 98 MMHG | TEMPERATURE: 98.8 F | HEIGHT: 65 IN | WEIGHT: 216.4 LBS | RESPIRATION RATE: 18 BRPM | HEART RATE: 101 BPM

## 2019-07-05 DIAGNOSIS — R59.9 ENLARGED LYMPH NODES: ICD-10-CM

## 2019-07-05 DIAGNOSIS — L72.3 SEBACEOUS CYST: ICD-10-CM

## 2019-07-05 DIAGNOSIS — R22.1 NECK MASS: Primary | ICD-10-CM

## 2019-07-05 PROCEDURE — 99213 OFFICE O/P EST LOW 20 MIN: CPT | Performed by: SURGERY

## 2019-07-05 ASSESSMENT — PAIN SCALES - GENERAL: PAINLEVEL: NO PAIN (0)

## 2019-07-05 ASSESSMENT — MIFFLIN-ST. JEOR: SCORE: 1712.46

## 2019-07-05 NOTE — PROGRESS NOTES
Surgery Consult Clinic Note      RE: Meredith Gordon  : 1990    Chief Complaint:  Neck mass    History of Present Illness:  Mrs. Gordon is a very pleasant 29 year old female who I am seeing at the request of Dr. Jovana Lambert MD for evaluation of a right sided neck mass and to make further recommendations.  Noticed 2-3 months ago while getting dressed.  While putting in an ear ring, felt a mass just below the ear.  Noticed that there wasn't something on the left.  Its will alter size, getting bigger and smaller.  Doesn't drain, doesn't hurt.  She did have a cold 2 weeks ago that resolved.  No changes in breathing, hearing, sinus pain or congestion.  Denies fevers, chills, sweats.  No history of lymphoma.  Doesn't smoke, doesn't drink alcohol on a regular disease.  Does have hashimoto's thyroiditis    Medical history:  Past Medical History:   Diagnosis Date     B12 deficiency 2012    resolved     Hashimoto's disease 2011     Nephrolithiasis      in [facility] (at [facility])     PCOS (polycystic ovarian syndrome)        Surgical history:  Past Surgical History:   Procedure Laterality Date     MAMMOPLASTY REDUCTION  2015     wisdom teeth         Family history:  Family History   Problem Relation Age of Onset     Thyroid Disease Mother         hypothyroidism     Nephrolithiasis Father      Other - See Comments Sister         pcos     Family History Negative Sister      Family History Negative Brother      Family History Negative Brother      Family History Negative Brother      Family History Negative Sister      Hypothyroidism Maternal Grandmother      Thyroid Disease Maternal Grandmother      Rheumatoid Arthritis Maternal Grandfather      Diabetes Paternal Grandfather         type 2     Hypertension Paternal Grandfather        Medications:  Prior to Admission medications    Medication Sig Start Date End Date Taking? Authorizing Provider   acetylcysteine (N-ACETYL CYSTEINE) 600 MG CAPS  "capsule Take 1 capsule (600 mg) by mouth daily 7/1/19  Yes Jovana Lambert MD   metFORMIN (GLUCOPHAGE-XR) 500 MG 24 hr tablet Take 2 Tabs by mouth at bedtime. 7/1/19  Yes Jovana Lambert MD   metroNIDAZOLE (METROGEL) 0.75 % external gel Apply topically 2 times daily 7/1/19  Yes Jovana Lambert MD   ranitidine (ZANTAC) 300 MG tablet TAKE 1 TABLET AT BEDTIME 5/14/19  Yes Jovana Lambert MD   spironolactone (ALDACTONE) 50 MG tablet TAKE 1 TABLET TWICE A DAY 5/14/19  Yes Pihlip Mathis MD   thyroid (ARMOUR THYROID) 120 MG tablet Take 1 tablet by mouth two times a day. 7/1/19  Yes Jovana Lambert MD       Allergies:  The patientis allergic to cats.  .  Social history:  Social History     Tobacco Use     Smoking status: Never Smoker     Smokeless tobacco: Never Used   Substance Use Topics     Alcohol use: Yes     Alcohol/week: 0.0 oz     Frequency: Monthly or less     Comment: Occasional     Marital status: single.    Review of Systems:    Constitutional: Per HPI  HENT: Per HPI  Eyes: Negative for blurred vision, double vision, photophobia and pain.   Respiratory: Negative for cough, hemoptysis, shortness of breath, wheezing and stridor.    Cardiovascular: Negative for chest pain, palpitations and orthopnea.   Gastrointestinal: Negative for heartburn, nausea, vomiting, abdominal pain and blood in stool.   Genitourinary: Negative for urgency, frequency and hematuria.   Musculoskeletal: Negative for myalgias, back pain and joint pain.   Neurological: Negative for tingling, speech change and headaches.   Endo/Heme/Allergies: Does not bruise/bleed easily.   Psychiatric/Behavioral: Negative for depression, suicidal ideas and hallucinations. The patient is not nervous/anxious.    Physical Examination:  BP 98/84 (BP Location: Right arm, Patient Position: Sitting, Cuff Size: Adult Large)   Pulse 101   Temp 98.8  F (37.1  C) (Tympanic)   Resp 18   Ht 1.651 m (5' 5\")   Wt 98.2 kg (216 lb 6.4 oz)   SpO2 98%   " BMI 36.01 kg/m    General: AAOx4, NAD, WN/WD, ambulating without assistance  HEENT:NCAT, EOMI, PERRL Sclerae anicteric; Trachea mideline, no JVD, no cervical lymphadenopathy.  Just inferior to the ear on the right is a somewhat firm fixed 0.5 cm mass  Chest:   Clear to auscultation bilaterally.  Cardiac: S1S2 , regular rate and rhythm without additional sounds  Abdomen: Soft, ND/NT no rebound, no guarding  Extremities: Cursory exam unremarkable.  Skin: Warm, dry, < 2 sec cap refill  Neuro: CN 2-12 grossly intact, no focal deficit, GCS 15  Psych: happy, calm, asks appropriate questions      Assessment/Plan:  #1 Right neck mass    Not sure what this is.  Ordered ultrasound to better characterize. Will bring back to review results.       Dr. Shahram DO, [facility]  [address] [place] [address], [address]  [place]    [address]    Referring Provider:  Jovana Lambert MD  [address] [place] [address]     Primary Care Provider:  Jovana Lambert

## 2019-07-05 NOTE — NURSING NOTE
"Chief Complaint   Patient presents with     Consult     Enlarged Lymph nodes        Initial BP 98/84 (BP Location: Right arm, Patient Position: Sitting, Cuff Size: Adult Large)   Pulse 101   Temp 98.8  F (37.1  C) (Tympanic)   Resp 18   Ht 1.651 m (5' 5\")   Wt 98.2 kg (216 lb 6.4 oz)   SpO2 98%   BMI 36.01 kg/m   Estimated body mass index is 36.01 kg/m  as calculated from the following:    Height as of this encounter: 1.651 m (5' 5\").    Weight as of this encounter: 98.2 kg (216 lb 6.4 oz).  Medication Reconciliation: complete   Mary Barry LPN      "

## 2019-07-08 ENCOUNTER — HOSPITAL ENCOUNTER (OUTPATIENT)
Dept: ULTRASOUND IMAGING | Facility: HOSPITAL | Age: 29
Discharge: HOME | End: 2019-07-08
Attending: SURGERY | Admitting: SURGERY
Payer: COMMERCIAL

## 2019-07-08 ENCOUNTER — OFFICE VISIT (OUTPATIENT)
Dept: SURGERY | Facility: OTHER | Age: 29
End: 2019-07-08
Attending: SURGERY
Payer: COMMERCIAL

## 2019-07-08 VITALS
TEMPERATURE: 97.6 F | BODY MASS INDEX: 35.99 KG/M2 | DIASTOLIC BLOOD PRESSURE: 64 MMHG | HEART RATE: 95 BPM | HEIGHT: 65 IN | WEIGHT: 216 LBS | OXYGEN SATURATION: 99 % | SYSTOLIC BLOOD PRESSURE: 116 MMHG

## 2019-07-08 DIAGNOSIS — R22.1 NECK MASS: ICD-10-CM

## 2019-07-08 DIAGNOSIS — R59.9 LYMPH NODE ENLARGEMENT: Primary | ICD-10-CM

## 2019-07-08 PROCEDURE — 76536 US EXAM OF HEAD AND NECK: CPT | Mod: TC

## 2019-07-08 PROCEDURE — 99213 OFFICE O/P EST LOW 20 MIN: CPT | Performed by: SURGERY

## 2019-07-08 ASSESSMENT — MIFFLIN-ST. JEOR: SCORE: 1710.65

## 2019-07-08 ASSESSMENT — PAIN SCALES - GENERAL: PAINLEVEL: NO PAIN (0)

## 2019-07-08 NOTE — PROGRESS NOTES
"S: I met Mrs. Gordon last week for mass below the right ear.  To me it felt like a lymph node.  Requested an ultrasound to further characterize.    O: /64   Pulse 95   Temp 97.6  F (36.4  C)   Ht 1.651 m (5' 5\")   Wt 98 kg (216 lb)   SpO2 99%   BMI 35.94 kg/m     Gen: AAOx4, NAD, non toxic looking  Mouth: No erythema, mallampati score 3, tonsils without erythema or purulence  Neck: no lymphadenopathy    Results for orders placed or performed during the hospital encounter of 07/08/19   US Head Neck Soft Tissue    Narrative    US HEAD NECK SOFT TISSUE  7/8/2019 8:17 AM    History:Female, age 29 years Below Right Ear; Neck mass    Comparison: None.    At the area of the palpable abnormality, 2 well-circumscribed reniform  hypoechoic structure seen measuring 1.4 x 0.7 x 1.0 cm and 1.6 x 0.9 x  1.0 cm size. Each of these structures demonstrates blood flow at the  respective hilum suggesting prominent, possibly reactive lymph nodes.      Impression    Impression:  1.  The palpable abnormality likely represents 2 mildly enlarged  reactive lymph nodes lying adjacent to one another.    JUAN ALEJO MD     A/P  #1 reactive lymph node     Continue to observe, return PRN   "

## 2019-07-08 NOTE — PATIENT INSTRUCTIONS
Thank you for allowing Dr. Omalley and our surgical team to participate in your care. Please call our health unit coordinator at [phone] with scheduling questions or the nurse at [phone] with any other questions or concerns.

## 2019-09-11 DIAGNOSIS — K21.9 GASTROESOPHAGEAL REFLUX DISEASE, ESOPHAGITIS PRESENCE NOT SPECIFIED: ICD-10-CM

## 2019-09-11 DIAGNOSIS — E28.2 PCOS (POLYCYSTIC OVARIAN SYNDROME): ICD-10-CM

## 2019-09-11 DIAGNOSIS — L70.8 OTHER ACNE: ICD-10-CM

## 2019-09-12 RX ORDER — SPIRONOLACTONE 50 MG/1
TABLET, FILM COATED ORAL
Qty: 120 TABLET | Refills: 0 | Status: SHIPPED | OUTPATIENT
Start: 2019-09-12 | End: 2019-10-17

## 2019-09-12 NOTE — TELEPHONE ENCOUNTER
zantac  Last Written Prescription Date: 5/14/19  Last Fill Quantity: 90 # of Refills: 0  Last Office Visit: 7/1/19

## 2019-09-12 NOTE — TELEPHONE ENCOUNTER
spironolactone  Last Written Prescription Date: 5/14/19  Last Fill Quantity: 120 # of Refills: 0  Last Office Visit: 7/1/19

## 2019-10-11 NOTE — PROGRESS NOTES
Clinic Visit  Date of visit: 10/11/2019   Chief Complaint:   Chief Complaint   Patient presents with     Weight Loss     PCOS       HPI:   Meredith Gordon is a 29 year old  female who presents to clinic today for follow up  for PCOS .     Menarche: age 14  Menses: frequency: every 28 days and length: 5 days. Patient's last menstrual period was 10/04/2019.   Family Planning Chart: No.  Acne: Yes: .  Hirsutism (facial hair): No.  Male-pattern hair loss: No.  Elevated serum androgen: No results found for: DHEA, TESTOSTTOTAL, FT, JOVITA]  BMI: Body mass index is 34.45 kg/m .   Type 2 DM: No.  Elevated Cholesterol: No.  Mood disorder: No.    Ultrasound: done       Iron      Duration: years    Description (location/character/radiation): n/a    Intensity:  mild, moderate    Accompanying signs and symptoms: fatigue    History (similar episodes/previous evaluation): None    Precipitating or alleviating factors: None    Therapies tried and outcome: iron supplements      Weight loss      Duration: 3 years    Description (location/character/radiation): trouble losing    Intensity:  mild, moderate    Accompanying signs and symptoms: exercising 30 min 5-6x/wk    History (similar episodes/previous evaluation): tried weight watchers    Precipitating or alleviating factors: None    Therapies tried and outcome: None         Gynecologic History:  Last Pap:   Lab Results   Component Value Date    PAP NIL 2016      History of abnormal pap: No.    Obstetric History:   OB History    Para Term  AB Living   0 0 0 0 0 0   SAB TAB Ectopic Multiple Live Births   0 0 0 0 0     Obstetric history significant for:  n/a    Medications:  acetylcysteine (N-ACETYL CYSTEINE) 600 MG CAPS capsule, Take 1 capsule (600 mg) by mouth daily  metFORMIN (GLUCOPHAGE-XR) 500 MG 24 hr tablet, Take 2 Tabs by mouth at bedtime.  metroNIDAZOLE (METROGEL) 0.75 % external gel, Apply topically 2 times daily  ranitidine (ZANTAC) 300 MG tablet,  TAKE 1 TABLET AT BEDTIME  spironolactone (ALDACTONE) 50 MG tablet, TAKE 1 TABLET TWICE A DAY (Patient taking differently: daily )  thyroid (ARMOUR THYROID) 120 MG tablet, Take 1 tablet by mouth two times a day.    No current facility-administered medications on file prior to visit.       Allergy:  Allergies   Allergen Reactions     Cats Itching     Cat/feline product derivatives       Medical History:  Past Medical History:   Diagnosis Date     B12 deficiency 6/25/2012    resolved     Hashimoto's disease 9/2/2011     Nephrolithiasis     2011 in [facility] (at [facility])     PCOS (polycystic ovarian syndrome)        Surgical History:  Past Surgical History:   Procedure Laterality Date     MAMMOPLASTY REDUCTION  2015     wisdom teeth         Social History:  Social History    Substance and Sexual Activity      Alcohol use: Yes        Alcohol/week: 0.0 standard drinks        Frequency: Monthly or less        Comment: Occasional    History   Smoking Status     Never Smoker   Smokeless Tobacco     Never Used     History   Drug Use No     History   Sexual Activity     Sexual activity: Never     Relationship status is: Never been .    Lives in Stable housing and no concerns with Alone.   Employment: Employed full time  History of sexual, physical or mental abuse: No.   She denies current fears or concerns for personal safety.      Review of Systems:    GENERAL: No change in weight, sleep or appetite.  Normal energy.  No fever or chills  General: POSITIVE for:, weight gain, insomnia, up at night  EYES: Negative for vision changes or eye problems  ENT: No problems with ears, nose or throat.  No difficulty swallowing.  RESP: No coughing, wheezing or shortness of breath  CV: No chest pains or palpitations  GI: No nausea, vomiting,  heartburn, abdominal pain, diarrhea, constipation or change in bowel habits  : No urinary frequency or dysuria, bladder or kidney problems  MUSCULOSKELETAL: No significant muscle or joint  pains  NEUROLOGIC: No headaches, numbness, tingling, weakness, problems with balance or coordination  PSYCHIATRIC: No problems with anxiety, depression or mental health  Psychiatric: POSITIVE for:, stress, depressed mood, loss of interest, feelings of worthlessness,guilt  HEME/IMMUNE/ALLERGY: No history of bleeding or clotting problems or anemia.  No allergies or immune system problems  ENDOCRINE: No history of thyroid disease, diabetes or other endocrine disorders  Endocrine: POSITIVE for:, trouble losing weight  SKIN: No rashes,worrisome lesions or skin problems      Physical Exam:  Vitals:    10/17/19 0937   BP: 127/81   BP Location: Right arm   Patient Position: Chair   Cuff Size: Adult Large   Pulse: 100   Resp: 20   Temp: 98.1  F (36.7  C)   TempSrc: Tympanic   SpO2: 98%   Weight: 93.9 kg (207 lb)     Body mass index is 34.45 kg/m .    Gen: NAD, Awake and alert, cooperative with exam  CV: regular rate and rhythm  Resp: lungs clear to auscultation bilaterally  Abd: soft, nontender, nondistended, no rebound, no guarding  Extremities: nontender, no edema    Assessment/Plan:  Meredith Gordon is a 29 year old  who presents for follow up  for PCOS .     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 thyroiditis  (primary encounter diagnosis)  Comment:   Plan: TSH with free T4 reflex, levothyroxine         (SYNTHROID/LEVOTHROID) 112 MCG tablet        Still hyperthyroid -- will decrease dose and recheck in 4-5 wks    (E61.1) Iron deficiency  Comment:   Plan: Iron and iron binding capacity, CBC with         platelets differential        Start supplement daily    (F43.21) Adjustment disorder with depressed mood  Comment:   Plan: sertraline (ZOLOFT) 25 MG tablet, vitamin D2         (ERGOCALCIFEROL) 73338 units (1250 mcg) capsule        Follow-up 5-6 wks  Recommend counselor    (R59.1) Lymphadenopathy  Comment:   Plan: CBC with platelets differential            Follow up: 4-5 wks       Jovana Lambert MD

## 2019-10-17 ENCOUNTER — OFFICE VISIT (OUTPATIENT)
Dept: FAMILY MEDICINE | Facility: OTHER | Age: 29
End: 2019-10-17
Attending: FAMILY MEDICINE
Payer: COMMERCIAL

## 2019-10-17 VITALS
OXYGEN SATURATION: 98 % | TEMPERATURE: 98.1 F | SYSTOLIC BLOOD PRESSURE: 127 MMHG | WEIGHT: 207 LBS | BODY MASS INDEX: 34.45 KG/M2 | RESPIRATION RATE: 20 BRPM | HEART RATE: 100 BPM | DIASTOLIC BLOOD PRESSURE: 81 MMHG

## 2019-10-17 DIAGNOSIS — E06.3 HYPOTHYROIDISM DUE TO HASHIMOTO'S THYROIDITIS: Primary | ICD-10-CM

## 2019-10-17 DIAGNOSIS — E61.1 IRON DEFICIENCY: ICD-10-CM

## 2019-10-17 DIAGNOSIS — R59.1 LYMPHADENOPATHY: ICD-10-CM

## 2019-10-17 DIAGNOSIS — E06.3 HYPOTHYROIDISM DUE TO HASHIMOTO'S THYROIDITIS: ICD-10-CM

## 2019-10-17 DIAGNOSIS — F43.21 ADJUSTMENT DISORDER WITH DEPRESSED MOOD: ICD-10-CM

## 2019-10-17 LAB
BASOPHILS # BLD AUTO: 0 10E9/L (ref 0–0.2)
BASOPHILS NFR BLD AUTO: 0.5 %
DIFFERENTIAL METHOD BLD: ABNORMAL
EOSINOPHIL # BLD AUTO: 0.2 10E9/L (ref 0–0.7)
EOSINOPHIL NFR BLD AUTO: 2 %
ERYTHROCYTE [DISTWIDTH] IN BLOOD BY AUTOMATED COUNT: 13.1 % (ref 10–15)
HCT VFR BLD AUTO: 40.2 % (ref 35–47)
HGB BLD-MCNC: 12.5 G/DL (ref 11.7–15.7)
IMM GRANULOCYTES # BLD: 0 10E9/L (ref 0–0.4)
IMM GRANULOCYTES NFR BLD: 0.1 %
IRON SATN MFR SERPL: 9 % (ref 15–46)
IRON SERPL-MCNC: 37 UG/DL (ref 35–180)
LYMPHOCYTES # BLD AUTO: 2.1 10E9/L (ref 0.8–5.3)
LYMPHOCYTES NFR BLD AUTO: 28.1 %
MCH RBC QN AUTO: 25.1 PG (ref 26.5–33)
MCHC RBC AUTO-ENTMCNC: 31.1 G/DL (ref 31.5–36.5)
MCV RBC AUTO: 81 FL (ref 78–100)
MONOCYTES # BLD AUTO: 0.7 10E9/L (ref 0–1.3)
MONOCYTES NFR BLD AUTO: 9.7 %
NEUTROPHILS # BLD AUTO: 4.5 10E9/L (ref 1.6–8.3)
NEUTROPHILS NFR BLD AUTO: 59.6 %
NRBC # BLD AUTO: 0 10*3/UL
NRBC BLD AUTO-RTO: 0 /100
PLATELET # BLD AUTO: 493 10E9/L (ref 150–450)
RBC # BLD AUTO: 4.98 10E12/L (ref 3.8–5.2)
T4 FREE SERPL-MCNC: 1.51 NG/DL (ref 0.76–1.46)
TIBC SERPL-MCNC: 398 UG/DL (ref 240–430)
TSH SERPL DL<=0.005 MIU/L-ACNC: <0.01 MU/L (ref 0.4–4)
WBC # BLD AUTO: 7.6 10E9/L (ref 4–11)

## 2019-10-17 PROCEDURE — 83540 ASSAY OF IRON: CPT | Performed by: FAMILY MEDICINE

## 2019-10-17 PROCEDURE — 85025 COMPLETE CBC W/AUTO DIFF WBC: CPT | Performed by: FAMILY MEDICINE

## 2019-10-17 PROCEDURE — 99214 OFFICE O/P EST MOD 30 MIN: CPT | Performed by: FAMILY MEDICINE

## 2019-10-17 PROCEDURE — 83550 IRON BINDING TEST: CPT | Performed by: FAMILY MEDICINE

## 2019-10-17 PROCEDURE — 84439 ASSAY OF FREE THYROXINE: CPT | Performed by: FAMILY MEDICINE

## 2019-10-17 PROCEDURE — 84443 ASSAY THYROID STIM HORMONE: CPT | Performed by: FAMILY MEDICINE

## 2019-10-17 PROCEDURE — 36415 COLL VENOUS BLD VENIPUNCTURE: CPT | Performed by: FAMILY MEDICINE

## 2019-10-17 RX ORDER — SERTRALINE HYDROCHLORIDE 25 MG/1
25 TABLET, FILM COATED ORAL DAILY
Qty: 30 TABLET | Refills: 1 | Status: SHIPPED | OUTPATIENT
Start: 2019-10-17 | End: 2019-11-27 | Stop reason: DRUGHIGH

## 2019-10-17 RX ORDER — ERGOCALCIFEROL 1.25 MG/1
50000 CAPSULE, LIQUID FILLED ORAL
Qty: 4 CAPSULE | Refills: 1 | Status: SHIPPED | OUTPATIENT
Start: 2019-10-17 | End: 2020-02-14

## 2019-10-17 RX ORDER — LEVOTHYROXINE SODIUM 112 UG/1
112 TABLET ORAL DAILY
Qty: 30 TABLET | Refills: 1 | Status: SHIPPED | OUTPATIENT
Start: 2019-10-17 | End: 2019-11-27

## 2019-10-17 ASSESSMENT — PAIN SCALES - GENERAL: PAINLEVEL: NO PAIN (0)

## 2019-10-17 NOTE — NURSING NOTE
"Chief Complaint   Patient presents with     Weight Loss     PCOS       Initial /81 (BP Location: Right arm, Patient Position: Chair, Cuff Size: Adult Large)   Pulse 100   Temp 98.1  F (36.7  C) (Tympanic)   Resp 20   Wt 93.9 kg (207 lb)   LMP 10/04/2019   SpO2 98%   BMI 34.45 kg/m   Estimated body mass index is 34.45 kg/m  as calculated from the following:    Height as of 7/8/19: 1.651 m (5' 5\").    Weight as of this encounter: 93.9 kg (207 lb).  Medication Reconciliation: complete  Daksha Acevedo LPN    "

## 2019-10-17 NOTE — PATIENT INSTRUCTIONS
Psychologists/ Counselors      [facility]  [place]   [phone]  [facility]   [phone]  [facility] [phone]  [facility] (kids) [phone]  [facility](teens) [phone]  [facility]  [phone]  [facility]  [phone]  [facility]  [phone]  [facility]  [phone]  [facility]  [phone]  [facility] counseling [phone] (Opening new location)  [facility]   [phone] (Opening new location)  [facility]    [phone]      [facility]  [phone]  [facility] [phone]  [facility]  [phone]  [facility]  [phone]     [facility] [phone]      [facility] counseling [phone]  [facility]   [phone]  [facility] (Simone Bynum LMFT)                                                   [phone]  [facility]  [phone]  [facility] [phone]  [facility]      [phone]  [facility]       [phone]  [facility]  [phone]  [facility]      [phone]     [place]  [facility]   [phone]  [facility]      [phone]  [facility] Dr. SATURNINO Omalley [phone]  [facility]      [phone]  [facility]  [phone]        *Facilities in bold italics indicate medication management  Services are offered.    Crisis support  Mobile crisis line- [phone]  [facility]: Free online resources including therapy for Mental Health and substance use problems: Http://thriverange.org    Crisis Text Line  Text HOME to 489-486 - The Crisis Text Line serves anyone, in any type of crisis, providing access to free, 24/7 support and information via the medium people already use and trust  http://www.crisistextline.org   Here's how it works:  Text HOME to 339-627 from  anywhere in the [place], anytime, about any type of crisis.  A live, trained Crisis Counselor receives the text and responds quickly.  The volunteer Crisis Counselor will help you move from a 'hot moment to a cool moment'

## 2019-11-14 NOTE — PROGRESS NOTES
Subjective     Meredith Gordon is a 29 year old female who presents to clinic today for the following health issues:    HPI   Depression and Anxiety Follow-Up    How are you doing with your depression since your last visit? Improved     How are you doing with your anxiety since your last visit?  Improved     Are you having other symptoms that might be associated with depression or anxiety? No    Have you had a significant life event? No     Do you have any concerns with your use of alcohol or other drugs? No    Social History     Tobacco Use     Smoking status: Never Smoker     Smokeless tobacco: Never Used   Substance Use Topics     Alcohol use: Yes     Alcohol/week: 0.0 standard drinks     Frequency: Monthly or less     Comment: Occasional     Drug use: No     PHQ 6/18/2018 11/21/2018 7/1/2019   PHQ-9 Total Score 4 3 8   Q9: Thoughts of better off dead/self-harm past 2 weeks Not at all Not at all Not at all     ROBBIE-7 SCORE 6/18/2018 11/21/2018 7/1/2019   Total Score 0 0 2     Last PHQ-9 11/27/2019   1.  Little interest or pleasure in doing things 1   2.  Feeling down, depressed, or hopeless 1   3.  Trouble falling or staying asleep, or sleeping too much 2   4.  Feeling tired or having little energy 3   5.  Poor appetite or overeating 0   6.  Feeling bad about yourself 0   7.  Trouble concentrating 1   8.  Moving slowly or restless 0   Q9: Thoughts of better off dead/self-harm past 2 weeks 0   PHQ-9 Total Score 8   Difficulty at work, home, or with people Somewhat difficult     ROBBIE-7  11/27/2019   1. Feeling nervous, anxious, or on edge 0   2. Not being able to stop or control worrying 0   3. Worrying too much about different things 0   4. Trouble relaxing 1   5. Being so restless that it is hard to sit still 0   6. Becoming easily annoyed or irritable 0   7. Feeling afraid, as if something awful might happen 0   ROBBIE-7 Total Score 1   If you checked any problems, how difficult have they made it for you to do your  work, take care of things at home, or get along with other people? Not difficult at all       Suicide Assessment Five-step Evaluation and Treatment (SAFE-T)    Hypothyroidism Follow-up      Since last visit, patient describes the following symptoms: fatigue, hair loss and hard to concentrate. Feels thyroid medication is no right.       How many servings of fruits and vegetables do you eat daily?  4 or more    On average, how many sweetened beverages do you drink each day (soda, juice, sweet tea, etc)?   0    How many days per week do you miss taking your medication? 0    pcos    Patient Active Problem List   Diagnosis     PCOS (polycystic ovarian syndrome)     ACP (advance care planning)     Hypothyroidism due to Hashimoto's thyroiditis     Hyperlipidemia LDL goal <130     Iron deficiency     Rosacea     Enlarged lymph nodes     Sebaceous cyst     Weight gain     Adjustment disorder with depressed mood     Obesity (BMI 35.0-39.9) with comorbidity (H)     Past Surgical History:   Procedure Laterality Date     MAMMOPLASTY REDUCTION  2015     wisdom teeth         Social History     Tobacco Use     Smoking status: Never Smoker     Smokeless tobacco: Never Used   Substance Use Topics     Alcohol use: Yes     Alcohol/week: 0.0 standard drinks     Frequency: Monthly or less     Comment: Occasional     Family History   Problem Relation Age of Onset     Thyroid Disease Mother         hypothyroidism     Nephrolithiasis Father      Other - See Comments Sister         pcos     Family History Negative Sister      Family History Negative Brother      Family History Negative Brother      Family History Negative Brother      Family History Negative Sister      Hypothyroidism Maternal Grandmother      Thyroid Disease Maternal Grandmother      Rheumatoid Arthritis Maternal Grandfather      Diabetes Paternal Grandfather         type 2     Hypertension Paternal Grandfather          Current Outpatient Medications   Medication Sig  Dispense Refill     metFORMIN (GLUCOPHAGE-XR) 500 MG 24 hr tablet Take 2 Tabs by mouth at bedtime. 180 tablet 3     metroNIDAZOLE (METROGEL) 0.75 % external gel Apply topically 2 times daily 45 g 1     ranitidine (ZANTAC) 300 MG tablet TAKE 1 TABLET AT BEDTIME 90 tablet 0     sertraline (ZOLOFT) 50 MG tablet Take 1 tablet (50 mg) by mouth daily 30 tablet 1     spironolactone (ALDACTONE) 50 MG tablet TAKE 1 TABLET TWICE A DAY (Patient taking differently: daily ) 120 tablet 0     thyroid (ARMOUR) 90 MG tablet Take 1 tablet (90 mg) by mouth 2 times daily 60 tablet 1     vitamin D2 (ERGOCALCIFEROL) 80405 units (1250 mcg) capsule Take 1 capsule (50,000 Units) by mouth every 7 days for 8 doses 4 capsule 1     acetylcysteine (N-ACETYL CYSTEINE) 600 MG CAPS capsule Take 1 capsule (600 mg) by mouth daily 90 capsule 1     Allergies   Allergen Reactions     Cats Itching     Cat/feline product derivatives     BP Readings from Last 3 Encounters:   11/27/19 100/62   10/17/19 127/81   07/08/19 116/64    Wt Readings from Last 3 Encounters:   11/27/19 98 kg (216 lb)   10/17/19 93.9 kg (207 lb)   07/08/19 98 kg (216 lb)           Reviewed and updated as needed this visit by Provider         Review of Systems   ROS COMP: Constitutional, HEENT, cardiovascular, pulmonary, gi and gu systems are negative, except as otherwise noted.      Objective    /62   Pulse 76   Wt 98 kg (216 lb)   SpO2 98%   BMI 35.94 kg/m    There is no height or weight on file to calculate BMI.  Physical Exam   GENERAL: healthy, alert and no distress  RESP: lungs clear to auscultation - no rales, rhonchi or wheezes  CV: regular rate and rhythm, normal S1 S2, no S3 or S4, no murmur, click or rub, no peripheral edema and peripheral pulses strong  MS: no gross musculoskeletal defects noted, no edema  PSYCH: mentation appears normal, affect normal/bright    Diagnostic Test Results:  Labs reviewed in Epic  Results for orders placed or performed in visit on  "11/27/19   TSH with free T4 reflex     Status: Abnormal   Result Value Ref Range    TSH 0.03 (L) 0.40 - 4.00 mU/L   T4 free     Status: None   Result Value Ref Range    T4 Free 0.87 0.76 - 1.46 ng/dL           Assessment & Plan     (F43.21) Adjustment disorder with depressed mood  (primary encounter diagnosis)  Comment:   Plan: sertraline (ZOLOFT) 50 MG tablet, T4 free, T4         free        Feels mood is slightly better, will increase zoloft  Hasn't called counselor yet  Follow-up 2 mos    (E28.2) PCOS (polycystic ovarian syndrome)  Comment:   Plan: up vitamins, try low carb diet    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    Weight as of this encounter: 98 kg (216 lb).   Weight management plan: Discussed healthy diet and exercise guidelines      Patient was agreeable to this plan and had no further questions.    Patient Instructions   Code red lifestyle      No follow-ups on file.    Jovana Lambert MD  [facility]        "

## 2019-11-27 ENCOUNTER — OFFICE VISIT (OUTPATIENT)
Dept: FAMILY MEDICINE | Facility: OTHER | Age: 29
End: 2019-11-27
Attending: FAMILY MEDICINE
Payer: COMMERCIAL

## 2019-11-27 VITALS
WEIGHT: 216 LBS | SYSTOLIC BLOOD PRESSURE: 100 MMHG | BODY MASS INDEX: 35.94 KG/M2 | HEART RATE: 76 BPM | DIASTOLIC BLOOD PRESSURE: 62 MMHG | OXYGEN SATURATION: 98 %

## 2019-11-27 DIAGNOSIS — E06.3 HYPOTHYROIDISM DUE TO HASHIMOTO'S THYROIDITIS: ICD-10-CM

## 2019-11-27 DIAGNOSIS — E66.01 MORBID OBESITY (H): ICD-10-CM

## 2019-11-27 DIAGNOSIS — E28.2 PCOS (POLYCYSTIC OVARIAN SYNDROME): ICD-10-CM

## 2019-11-27 DIAGNOSIS — F43.21 ADJUSTMENT DISORDER WITH DEPRESSED MOOD: Primary | ICD-10-CM

## 2019-11-27 LAB
T4 FREE SERPL-MCNC: 0.87 NG/DL (ref 0.76–1.46)
TSH SERPL DL<=0.005 MIU/L-ACNC: 0.03 MU/L (ref 0.4–4)

## 2019-11-27 PROCEDURE — 84439 ASSAY OF FREE THYROXINE: CPT | Performed by: FAMILY MEDICINE

## 2019-11-27 PROCEDURE — 84443 ASSAY THYROID STIM HORMONE: CPT | Performed by: FAMILY MEDICINE

## 2019-11-27 PROCEDURE — 99214 OFFICE O/P EST MOD 30 MIN: CPT | Performed by: FAMILY MEDICINE

## 2019-11-27 PROCEDURE — 36415 COLL VENOUS BLD VENIPUNCTURE: CPT | Performed by: FAMILY MEDICINE

## 2019-11-27 RX ORDER — THYROID 90 MG/1
90 TABLET ORAL 2 TIMES DAILY
Qty: 60 TABLET | Refills: 1 | Status: SHIPPED | OUTPATIENT
Start: 2019-11-27 | End: 2020-02-04

## 2019-11-27 ASSESSMENT — ANXIETY QUESTIONNAIRES
1. FEELING NERVOUS, ANXIOUS, OR ON EDGE: NOT AT ALL
IF YOU CHECKED OFF ANY PROBLEMS ON THIS QUESTIONNAIRE, HOW DIFFICULT HAVE THESE PROBLEMS MADE IT FOR YOU TO DO YOUR WORK, TAKE CARE OF THINGS AT HOME, OR GET ALONG WITH OTHER PEOPLE: NOT DIFFICULT AT ALL
7. FEELING AFRAID AS IF SOMETHING AWFUL MIGHT HAPPEN: NOT AT ALL
2. NOT BEING ABLE TO STOP OR CONTROL WORRYING: NOT AT ALL
5. BEING SO RESTLESS THAT IT IS HARD TO SIT STILL: NOT AT ALL
GAD7 TOTAL SCORE: 1
4. TROUBLE RELAXING: SEVERAL DAYS
3. WORRYING TOO MUCH ABOUT DIFFERENT THINGS: NOT AT ALL
6. BECOMING EASILY ANNOYED OR IRRITABLE: NOT AT ALL

## 2019-11-27 ASSESSMENT — PAIN SCALES - GENERAL: PAINLEVEL: NO PAIN (0)

## 2019-11-27 ASSESSMENT — PATIENT HEALTH QUESTIONNAIRE - PHQ9: SUM OF ALL RESPONSES TO PHQ QUESTIONS 1-9: 8

## 2019-11-27 NOTE — NURSING NOTE
"Chief Complaint   Patient presents with     Depression     Thyroid Problem       Initial /62   Pulse 76   Wt 98 kg (216 lb)   SpO2 98%   BMI 35.94 kg/m   Estimated body mass index is 35.94 kg/m  as calculated from the following:    Height as of 7/8/19: 1.651 m (5' 5\").    Weight as of this encounter: 98 kg (216 lb).  Medication Reconciliation: complete  Jahaira Argueta MA    "

## 2019-11-28 ASSESSMENT — ANXIETY QUESTIONNAIRES: GAD7 TOTAL SCORE: 1

## 2020-01-30 NOTE — PROGRESS NOTES
Clinic Visit  Date of visit: 2020   Chief Complaint: No chief complaint on file.      HPI:   Meredith Gordon is a 29 year old  female who presents to clinic today for follow up  for PCOS .     Menarche: age 14  Menses: frequency: every 26 days and length: 5 days. Patient's last menstrual period was 2020 (exact date).   Family Planning Chart: No.  Acne: Yes: .  Hirsutism (facial hair): No.  Male-pattern hair loss: No.  Elevated serum androgen: No results found for: DHEA, TESTOSTTOTAL, FT, JOVITA]  BMI: Body mass index is 36.21 kg/m .   Type 2 DM: No.  Elevated Cholesterol: No.  Mood disorder: No.    Depression and Anxiety Follow-Up    How are you doing with your depression since your last visit? Improved     How are you doing with your anxiety since your last visit?  Improved     Are you having other symptoms that might be associated with depression or anxiety? No    Have you had a significant life event? No     Do you have any concerns with your use of alcohol or other drugs? No    Social History     Tobacco Use     Smoking status: Never Smoker     Smokeless tobacco: Never Used   Substance Use Topics     Alcohol use: Yes     Alcohol/week: 0.0 standard drinks     Frequency: Monthly or less     Comment: Occasional     Drug use: No     PHQ 2018   PHQ-9 Total Score 3 8 8   Q9: Thoughts of better off dead/self-harm past 2 weeks Not at all Not at all Not at all     ROBBIE-7 SCORE 2018   Total Score 0 2 1     Last PHQ-9 2020   1.  Little interest or pleasure in doing things 0   2.  Feeling down, depressed, or hopeless 0   3.  Trouble falling or staying asleep, or sleeping too much 0   4.  Feeling tired or having little energy 1   5.  Poor appetite or overeating 1   6.  Feeling bad about yourself 1   7.  Trouble concentrating 0   8.  Moving slowly or restless 0   Q9: Thoughts of better off dead/self-harm past 2 weeks 0   PHQ-9 Total Score 3   Difficulty  at work, home, or with people Not difficult at all     ROBBIE-7  2020   1. Feeling nervous, anxious, or on edge 0   2. Not being able to stop or control worrying 0   3. Worrying too much about different things 0   4. Trouble relaxing 0   5. Being so restless that it is hard to sit still 0   6. Becoming easily annoyed or irritable 0   7. Feeling afraid, as if something awful might happen 0   ROBBIE-7 Total Score 0   If you checked any problems, how difficult have they made it for you to do your work, take care of things at home, or get along with other people? -     Suicide Assessment Five-step Evaluation and Treatment (SAFE-T)      How many servings of fruits and vegetables do you eat daily?  2-3    On average, how many sweetened beverages do you drink each day (Examples: soda, juice, sweet tea, etc.  Do NOT count diet or artificially sweetened beverages)?   1    How many days per week do you exercise enough to make your heart beat faster? 5    How many minutes a day do you exercise enough to make your heart beat faster? 30 - 60    How many days per week do you miss taking your medication? 0         Ultrasound: done     Gynecologic History:  Last Pap:   Lab Results   Component Value Date    PAP NIL 2016      History of abnormal pap: No.    Obstetric History:   OB History    Para Term  AB Living   0 0 0 0 0 0   SAB TAB Ectopic Multiple Live Births   0 0 0 0 0     Obstetric history significant for:  n/a    Medications:  metFORMIN (GLUCOPHAGE-XR) 500 MG 24 hr tablet, Take 2 Tabs by mouth at bedtime.  metroNIDAZOLE (METROGEL) 0.75 % external gel, Apply topically 2 times daily  sertraline (ZOLOFT) 50 MG tablet, Take 1 tablet (50 mg) by mouth daily  spironolactone (ALDACTONE) 50 MG tablet, Take 1 tablet (50 mg) by mouth 2 times daily  thyroid (ARMOUR) 90 MG tablet, Take 1 tablet (90 mg) by mouth 2 times daily  vitamin D3 (CHOLECALCIFEROL) 2000 units (50 mcg) tablet, Take 2 tablets (4,000 Units) by mouth  daily  acetylcysteine (N-ACETYL CYSTEINE) 600 MG CAPS capsule, Take 1 capsule (600 mg) by mouth daily (Patient not taking: Reported on 2/14/2020)  ranitidine (ZANTAC) 300 MG tablet, TAKE 1 TABLET AT BEDTIME (Patient not taking: Reported on 2/14/2020)    No current facility-administered medications on file prior to visit.       Allergy:  Allergies   Allergen Reactions     Cats Itching     Cat/feline product derivatives       Medical History:  Past Medical History:   Diagnosis Date     B12 deficiency 6/25/2012    resolved     Hashimoto's disease 9/2/2011     Nephrolithiasis     2011 in [facility] (at [facility])     PCOS (polycystic ovarian syndrome)        Surgical History:  Past Surgical History:   Procedure Laterality Date     MAMMOPLASTY REDUCTION  2015     wisdom teeth         Social History:  Social History    Substance and Sexual Activity      Alcohol use: Yes        Alcohol/week: 0.0 standard drinks        Frequency: Monthly or less        Comment: Occasional    History   Smoking Status     Never Smoker   Smokeless Tobacco     Never Used     History   Drug Use No     History   Sexual Activity     Sexual activity: Never     Relationship status is: Never been .    Lives in Stable housing and no concerns with Alone.   Employment: Employed part time  History of sexual, physical or mental abuse: No.   She denies current fears or concerns for personal safety.      Review of Systems:    GENERAL: No change in weight, sleep or appetite.  Normal energy.  No fever or chills  EYES: Negative for vision changes or eye problems  ENT: No problems with ears, nose or throat.  No difficulty swallowing.  RESP: No coughing, wheezing or shortness of breath  CV: No chest pains or palpitations  GI: No nausea, vomiting,  heartburn, abdominal pain, diarrhea, constipation or change in bowel habits  : No urinary frequency or dysuria, bladder or kidney problems  MUSCULOSKELETAL: No significant muscle or joint pains  NEUROLOGIC:  No headaches, numbness, tingling, weakness, problems with balance or coordination  PSYCHIATRIC: No problems with anxiety, depression or mental health  HEME/IMMUNE/ALLERGY: No history of bleeding or clotting problems or anemia.  No allergies or immune system problems  ENDOCRINE: No history of thyroid disease, diabetes or other endocrine disorders  SKIN: No rashes,worrisome lesions or skin problems      Physical Exam:  Vitals:    20 1554   BP: 110/62   BP Location: Left arm   Patient Position: Sitting   Cuff Size: Adult Large   Pulse: 84   Resp: 18   Temp: 97.6  F (36.4  C)   TempSrc: Tympanic   SpO2: 98%   Weight: 98.7 kg (217 lb 9.6 oz)     Body mass index is 36.21 kg/m .    Gen: NAD, Awake and alert, cooperative with exam  CV: regular rate and rhythm  Resp: lungs clear to auscultation bilaterally  Abd: soft, nontender, nondistended, no rebound, no guarding  Extremities: nontender, no edema  Psych:  Normal mentation and mood    Assessment/Plan:  Meredith Gordon is a 29 year old  who presents for follow up  for PCOS    (F43.21) Adjustment disorder with depressed mood  Comment:   Plan: sertraline (ZOLOFT) 50 MG tablet, T4 free        Follow-up 6 mos doing much better with less tears, sleeping better though still having trouble    (E28.2) PCOS (polycystic ovarian syndrome)  Comment:   Plan: spironolactone (ALDACTONE) 50 MG tablet        Refilled  Discussed weight loss, code red and other anti-inflammatory meds    (L70.8) Other acne  Comment:   Plan: spironolactone (ALDACTONE) 50 MG tablet        refilled    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 thyroiditis  Comment:   Plan: thyroid (ARMOUR) 90 MG tablet, triamcinolone         (KENALOG) 0.1 % external cream            Dermatitis -- try triamcinolone cream, derm referral if not helping          Jovana Lambert MD

## 2020-02-04 ENCOUNTER — MYC REFILL (OUTPATIENT)
Dept: FAMILY MEDICINE | Facility: OTHER | Age: 30
End: 2020-02-04

## 2020-02-04 DIAGNOSIS — L70.8 OTHER ACNE: ICD-10-CM

## 2020-02-04 DIAGNOSIS — F43.21 ADJUSTMENT DISORDER WITH DEPRESSED MOOD: ICD-10-CM

## 2020-02-04 DIAGNOSIS — E28.2 PCOS (POLYCYSTIC OVARIAN SYNDROME): ICD-10-CM

## 2020-02-04 DIAGNOSIS — E06.3 HYPOTHYROIDISM DUE TO HASHIMOTO'S THYROIDITIS: ICD-10-CM

## 2020-02-06 DIAGNOSIS — E06.3 HYPOTHYROIDISM DUE TO HASHIMOTO'S THYROIDITIS: ICD-10-CM

## 2020-02-06 DIAGNOSIS — F43.21 ADJUSTMENT DISORDER WITH DEPRESSED MOOD: ICD-10-CM

## 2020-02-06 RX ORDER — CHOLECALCIFEROL (VITAMIN D3) 50 MCG
2 TABLET ORAL DAILY
Qty: 90 TABLET | Refills: 1 | Status: SHIPPED | OUTPATIENT
Start: 2020-02-06

## 2020-02-06 RX ORDER — SPIRONOLACTONE 50 MG/1
50 TABLET, FILM COATED ORAL 2 TIMES DAILY
Qty: 120 TABLET | Refills: 0 | Status: SHIPPED | OUTPATIENT
Start: 2020-02-06 | End: 2020-02-14

## 2020-02-06 RX ORDER — THYROID,PORK 90 MG
TABLET ORAL
Qty: 60 TABLET | Refills: 1 | OUTPATIENT
Start: 2020-02-06

## 2020-02-06 RX ORDER — THYROID 90 MG/1
90 TABLET ORAL 2 TIMES DAILY
Qty: 60 TABLET | Refills: 1 | Status: SHIPPED | OUTPATIENT
Start: 2020-02-06 | End: 2020-02-14

## 2020-02-14 ENCOUNTER — OFFICE VISIT (OUTPATIENT)
Dept: FAMILY MEDICINE | Facility: OTHER | Age: 30
End: 2020-02-14
Attending: FAMILY MEDICINE
Payer: COMMERCIAL

## 2020-02-14 VITALS
DIASTOLIC BLOOD PRESSURE: 62 MMHG | RESPIRATION RATE: 18 BRPM | SYSTOLIC BLOOD PRESSURE: 110 MMHG | HEART RATE: 84 BPM | OXYGEN SATURATION: 98 % | TEMPERATURE: 97.6 F | WEIGHT: 217.6 LBS | BODY MASS INDEX: 36.21 KG/M2

## 2020-02-14 DIAGNOSIS — F43.21 ADJUSTMENT DISORDER WITH DEPRESSED MOOD: ICD-10-CM

## 2020-02-14 DIAGNOSIS — E28.2 PCOS (POLYCYSTIC OVARIAN SYNDROME): ICD-10-CM

## 2020-02-14 DIAGNOSIS — E06.3 HYPOTHYROIDISM DUE TO HASHIMOTO'S THYROIDITIS: ICD-10-CM

## 2020-02-14 DIAGNOSIS — L70.8 OTHER ACNE: ICD-10-CM

## 2020-02-14 LAB
T4 FREE SERPL-MCNC: 0.9 NG/DL (ref 0.76–1.46)
TSH SERPL DL<=0.005 MIU/L-ACNC: <0.01 MU/L (ref 0.4–4)

## 2020-02-14 PROCEDURE — 84443 ASSAY THYROID STIM HORMONE: CPT | Performed by: FAMILY MEDICINE

## 2020-02-14 PROCEDURE — 84439 ASSAY OF FREE THYROXINE: CPT | Performed by: FAMILY MEDICINE

## 2020-02-14 PROCEDURE — 36415 COLL VENOUS BLD VENIPUNCTURE: CPT | Performed by: FAMILY MEDICINE

## 2020-02-14 PROCEDURE — 99214 OFFICE O/P EST MOD 30 MIN: CPT | Performed by: FAMILY MEDICINE

## 2020-02-14 RX ORDER — THYROID 15 MG/1
15 TABLET ORAL DAILY
Qty: 30 TABLET | Refills: 1 | Status: SHIPPED | OUTPATIENT
Start: 2020-02-14 | End: 2020-04-22

## 2020-02-14 RX ORDER — TRIAMCINOLONE ACETONIDE 1 MG/G
CREAM TOPICAL 2 TIMES DAILY
Qty: 45 G | Refills: 1 | Status: SHIPPED | OUTPATIENT
Start: 2020-02-14 | End: 2022-05-03

## 2020-02-14 RX ORDER — SPIRONOLACTONE 50 MG/1
50 TABLET, FILM COATED ORAL 2 TIMES DAILY
Qty: 120 TABLET | Refills: 1 | Status: SHIPPED | OUTPATIENT
Start: 2020-02-14 | End: 2020-10-27

## 2020-02-14 RX ORDER — THYROID 90 MG/1
90 TABLET ORAL 2 TIMES DAILY
Qty: 180 TABLET | Refills: 1 | Status: SHIPPED | OUTPATIENT
Start: 2020-02-14 | End: 2020-02-14 | Stop reason: DRUGHIGH

## 2020-02-14 RX ORDER — THYROID 60 MG/1
60 TABLET ORAL DAILY
Qty: 30 TABLET | Refills: 1 | Status: SHIPPED | OUTPATIENT
Start: 2020-02-14 | End: 2020-04-22

## 2020-02-14 ASSESSMENT — ANXIETY QUESTIONNAIRES
6. BECOMING EASILY ANNOYED OR IRRITABLE: NOT AT ALL
GAD7 TOTAL SCORE: 0
1. FEELING NERVOUS, ANXIOUS, OR ON EDGE: NOT AT ALL
3. WORRYING TOO MUCH ABOUT DIFFERENT THINGS: NOT AT ALL
5. BEING SO RESTLESS THAT IT IS HARD TO SIT STILL: NOT AT ALL
2. NOT BEING ABLE TO STOP OR CONTROL WORRYING: NOT AT ALL
7. FEELING AFRAID AS IF SOMETHING AWFUL MIGHT HAPPEN: NOT AT ALL

## 2020-02-14 ASSESSMENT — PATIENT HEALTH QUESTIONNAIRE - PHQ9
SUM OF ALL RESPONSES TO PHQ QUESTIONS 1-9: 3
5. POOR APPETITE OR OVEREATING: NOT AT ALL

## 2020-02-14 ASSESSMENT — PAIN SCALES - GENERAL: PAINLEVEL: NO PAIN (1)

## 2020-02-14 NOTE — NURSING NOTE
"No chief complaint on file.      Initial /62 (BP Location: Left arm, Patient Position: Sitting, Cuff Size: Adult Large)   Pulse 84   Temp 97.6  F (36.4  C) (Tympanic)   Resp 18   Wt 98.7 kg (217 lb 9.6 oz)   LMP 02/13/2020 (Exact Date)   SpO2 98%   BMI 36.21 kg/m   Estimated body mass index is 36.21 kg/m  as calculated from the following:    Height as of 7/8/19: 1.651 m (5' 5\").    Weight as of this encounter: 98.7 kg (217 lb 9.6 oz).  Medication Reconciliation: complete  Eliecer Guaman LPN    "

## 2020-02-15 ASSESSMENT — ANXIETY QUESTIONNAIRES: GAD7 TOTAL SCORE: 0

## 2020-04-22 DIAGNOSIS — E06.3 HYPOTHYROIDISM DUE TO HASHIMOTO'S THYROIDITIS: ICD-10-CM

## 2020-04-22 RX ORDER — THYROID,PORK 15 MG
TABLET ORAL
Qty: 90 TABLET | Refills: 0 | Status: SHIPPED | OUTPATIENT
Start: 2020-04-22 | End: 2020-06-01 | Stop reason: DRUGHIGH

## 2020-04-22 RX ORDER — THYROID 60 MG
TABLET ORAL
Qty: 90 TABLET | Refills: 0 | Status: SHIPPED | OUTPATIENT
Start: 2020-04-22 | End: 2020-06-01

## 2020-04-22 NOTE — TELEPHONE ENCOUNTER
Thyroid  15mg and 60mg  Last Written Prescription Date:   02/14/20   Last Fill Quantity: 30,   # refills: 1  Last Office Visit: 02/14/20  Future Office visit:       Routing refill request to provider for review/approval because:  Last TSH  Normal TSH on file in past 12 months         Recent Labs   Lab Test 02/14/20  1651   TSH <0.01*

## 2020-05-26 ENCOUNTER — APPOINTMENT (OUTPATIENT)
Dept: CT IMAGING | Facility: HOSPITAL | Age: 30
End: 2020-05-26
Attending: EMERGENCY MEDICINE
Payer: COMMERCIAL

## 2020-05-26 ENCOUNTER — ANESTHESIA EVENT (OUTPATIENT)
Dept: EMERGENCY MEDICINE | Facility: HOSPITAL | Age: 30
End: 2020-05-26
Payer: COMMERCIAL

## 2020-05-26 ENCOUNTER — ANESTHESIA (OUTPATIENT)
Dept: EMERGENCY MEDICINE | Facility: HOSPITAL | Age: 30
End: 2020-05-26
Payer: COMMERCIAL

## 2020-05-26 ENCOUNTER — TRANSFERRED RECORDS (OUTPATIENT)
Dept: HEALTH INFORMATION MANAGEMENT | Facility: CLINIC | Age: 30
End: 2020-05-26

## 2020-05-26 ENCOUNTER — HOSPITAL ENCOUNTER (EMERGENCY)
Facility: HOSPITAL | Age: 30
Discharge: HOME | End: 2020-05-26
Attending: EMERGENCY MEDICINE | Admitting: EMERGENCY MEDICINE
Payer: COMMERCIAL

## 2020-05-26 VITALS
OXYGEN SATURATION: 97 % | RESPIRATION RATE: 18 BRPM | TEMPERATURE: 98.8 F | HEART RATE: 72 BPM | DIASTOLIC BLOOD PRESSURE: 72 MMHG | SYSTOLIC BLOOD PRESSURE: 109 MMHG

## 2020-05-26 DIAGNOSIS — H47.10 PAPILLEDEMA: Primary | ICD-10-CM

## 2020-05-26 LAB
ALBUMIN SERPL-MCNC: 4.3 G/DL (ref 3.4–5)
ALBUMIN UR-MCNC: NEGATIVE MG/DL
ALP SERPL-CCNC: 79 U/L (ref 40–150)
ALT SERPL W P-5'-P-CCNC: 23 U/L (ref 0–50)
ANION GAP SERPL CALCULATED.3IONS-SCNC: 5 MMOL/L (ref 3–14)
APPEARANCE UR: CLEAR
AST SERPL W P-5'-P-CCNC: 12 U/L (ref 0–45)
BACTERIA #/AREA URNS HPF: ABNORMAL /HPF
BILIRUB SERPL-MCNC: 0.4 MG/DL (ref 0.2–1.3)
BILIRUB UR QL STRIP: NEGATIVE
BUN SERPL-MCNC: 10 MG/DL (ref 7–30)
CALCIUM SERPL-MCNC: 9.4 MG/DL (ref 8.5–10.1)
CHLORIDE SERPL-SCNC: 105 MMOL/L (ref 94–109)
CO2 SERPL-SCNC: 27 MMOL/L (ref 20–32)
COLOR UR AUTO: ABNORMAL
CREAT SERPL-MCNC: 0.72 MG/DL (ref 0.52–1.04)
DIFFERENTIAL METHOD BLD: ABNORMAL
EOSINOPHIL # BLD AUTO: 0.2 10E9/L (ref 0–0.7)
EOSINOPHIL NFR BLD AUTO: 2 %
ERYTHROCYTE [DISTWIDTH] IN BLOOD BY AUTOMATED COUNT: 14.2 % (ref 10–15)
ERYTHROCYTE [SEDIMENTATION RATE] IN BLOOD BY WESTERGREN METHOD: 20 MM/H (ref 0–20)
GFR SERPL CREATININE-BSD FRML MDRD: >90 ML/MIN/{1.73_M2}
GLUCOSE CSF-MCNC: 62 MG/DL (ref 40–70)
GLUCOSE SERPL-MCNC: 97 MG/DL (ref 70–99)
GLUCOSE UR STRIP-MCNC: NEGATIVE MG/DL
HCG UR QL: NEGATIVE
HCT VFR BLD AUTO: 39.9 % (ref 35–47)
HGB BLD-MCNC: 12.9 G/DL (ref 11.7–15.7)
HGB UR QL STRIP: NEGATIVE
INR PPP: 0.94 (ref 0.86–1.14)
KETONES UR STRIP-MCNC: NEGATIVE MG/DL
LEUKOCYTE ESTERASE UR QL STRIP: ABNORMAL
LYMPHOCYTES # BLD AUTO: 3.6 10E9/L (ref 0.8–5.3)
LYMPHOCYTES NFR BLD AUTO: 32 %
MCH RBC QN AUTO: 26.7 PG (ref 26.5–33)
MCHC RBC AUTO-ENTMCNC: 32.3 G/DL (ref 31.5–36.5)
MCV RBC AUTO: 82 FL (ref 78–100)
MONOCYTES # BLD AUTO: 0.7 10E9/L (ref 0–1.3)
MONOCYTES NFR BLD AUTO: 6 %
NEUTROPHILS # BLD AUTO: 6.9 10E9/L (ref 1.6–8.3)
NEUTROPHILS NFR BLD AUTO: 60 %
NITRATE UR QL: NEGATIVE
PH UR STRIP: 6 PH (ref 4.7–8)
PLATELET # BLD AUTO: 410 10E9/L (ref 150–450)
POTASSIUM SERPL-SCNC: 3.8 MMOL/L (ref 3.4–5.3)
PROT CSF-MCNC: 49 MG/DL (ref 15–60)
PROT SERPL-MCNC: 8.4 G/DL (ref 6.8–8.8)
RBC # BLD AUTO: 4.84 10E12/L (ref 3.8–5.2)
RBC #/AREA URNS AUTO: 1 /HPF (ref 0–2)
SODIUM SERPL-SCNC: 137 MMOL/L (ref 133–144)
SOURCE: ABNORMAL
SP GR UR STRIP: 1.01 (ref 1–1.03)
T4 FREE SERPL-MCNC: 0.67 NG/DL (ref 0.76–1.46)
TSH SERPL DL<=0.005 MIU/L-ACNC: 8.63 MU/L (ref 0.4–4)
UROBILINOGEN UR STRIP-MCNC: NORMAL MG/DL (ref 0–2)
WBC # BLD AUTO: 11.4 10E9/L (ref 4–11)
WBC #/AREA URNS AUTO: 4 /HPF (ref 0–5)

## 2020-05-26 PROCEDURE — 84157 ASSAY OF PROTEIN OTHER: CPT | Performed by: FAMILY MEDICINE

## 2020-05-26 PROCEDURE — 80053 COMPREHEN METABOLIC PANEL: CPT | Performed by: EMERGENCY MEDICINE

## 2020-05-26 PROCEDURE — 99285 EMERGENCY DEPT VISIT HI MDM: CPT | Mod: 25

## 2020-05-26 PROCEDURE — 84439 ASSAY OF FREE THYROXINE: CPT | Performed by: FAMILY MEDICINE

## 2020-05-26 PROCEDURE — 87205 SMEAR GRAM STAIN: CPT | Performed by: FAMILY MEDICINE

## 2020-05-26 PROCEDURE — 82945 GLUCOSE OTHER FLUID: CPT | Performed by: FAMILY MEDICINE

## 2020-05-26 PROCEDURE — 70470 CT HEAD/BRAIN W/O & W/DYE: CPT | Mod: TC

## 2020-05-26 PROCEDURE — 85025 COMPLETE CBC W/AUTO DIFF WBC: CPT | Performed by: EMERGENCY MEDICINE

## 2020-05-26 PROCEDURE — 87070 CULTURE OTHR SPECIMN AEROBIC: CPT | Performed by: FAMILY MEDICINE

## 2020-05-26 PROCEDURE — 81025 URINE PREGNANCY TEST: CPT | Performed by: EMERGENCY MEDICINE

## 2020-05-26 PROCEDURE — 96361 HYDRATE IV INFUSION ADD-ON: CPT

## 2020-05-26 PROCEDURE — 96360 HYDRATION IV INFUSION INIT: CPT | Mod: XU

## 2020-05-26 PROCEDURE — 84443 ASSAY THYROID STIM HORMONE: CPT | Performed by: FAMILY MEDICINE

## 2020-05-26 PROCEDURE — 99285 EMERGENCY DEPT VISIT HI MDM: CPT | Mod: Z6 | Performed by: FAMILY MEDICINE

## 2020-05-26 PROCEDURE — 25500064 ZZH RX 255 OP 636: Performed by: EMERGENCY MEDICINE

## 2020-05-26 PROCEDURE — 62270 DX LMBR SPI PNXR: CPT | Performed by: NURSE ANESTHETIST, CERTIFIED REGISTERED

## 2020-05-26 PROCEDURE — 36415 COLL VENOUS BLD VENIPUNCTURE: CPT | Performed by: EMERGENCY MEDICINE

## 2020-05-26 PROCEDURE — 25800030 ZZH RX IP 258 OP 636: Performed by: EMERGENCY MEDICINE

## 2020-05-26 PROCEDURE — 85610 PROTHROMBIN TIME: CPT

## 2020-05-26 PROCEDURE — 62270 DX LMBR SPI PNXR: CPT

## 2020-05-26 PROCEDURE — 25000132 ZZH RX MED GY IP 250 OP 250 PS 637: Performed by: FAMILY MEDICINE

## 2020-05-26 PROCEDURE — 85652 RBC SED RATE AUTOMATED: CPT | Performed by: EMERGENCY MEDICINE

## 2020-05-26 PROCEDURE — 81001 URINALYSIS AUTO W/SCOPE: CPT | Performed by: EMERGENCY MEDICINE

## 2020-05-26 RX ORDER — IOPAMIDOL 612 MG/ML
100 INJECTION, SOLUTION INTRAVASCULAR ONCE
Status: COMPLETED | OUTPATIENT
Start: 2020-05-26 | End: 2020-05-26

## 2020-05-26 RX ORDER — ONDANSETRON 2 MG/ML
4 INJECTION INTRAMUSCULAR; INTRAVENOUS EVERY 30 MIN PRN
Status: DISCONTINUED | OUTPATIENT
Start: 2020-05-26 | End: 2020-05-27 | Stop reason: HOSPADM

## 2020-05-26 RX ORDER — ACETAZOLAMIDE 250 MG/1
500 TABLET ORAL ONCE
Status: COMPLETED | OUTPATIENT
Start: 2020-05-26 | End: 2020-05-26

## 2020-05-26 RX ORDER — ACETAZOLAMIDE 250 MG/1
500 TABLET ORAL 2 TIMES DAILY
Qty: 120 TABLET | Refills: 0 | Status: SHIPPED | OUTPATIENT
Start: 2020-05-26 | End: 2020-06-01

## 2020-05-26 RX ADMIN — ACETAZOLAMIDE 500 MG: 250 TABLET ORAL at 23:02

## 2020-05-26 RX ADMIN — SODIUM CHLORIDE 250 ML: 9 INJECTION, SOLUTION INTRAVENOUS at 20:08

## 2020-05-26 RX ADMIN — IOPAMIDOL 100 ML: 612 INJECTION, SOLUTION INTRAVENOUS at 20:26

## 2020-05-26 ASSESSMENT — ENCOUNTER SYMPTOMS
BACK PAIN: 0
EYE PAIN: 0
RHINORRHEA: 0
PALPITATIONS: 0
CHEST TIGHTNESS: 0
HEADACHES: 1
DIARRHEA: 0
FATIGUE: 0
EYE REDNESS: 0
ABDOMINAL PAIN: 0
ANAL BLEEDING: 0
ACTIVITY CHANGE: 0
CHILLS: 0
SEIZURES: 0
SINUS PAIN: 0
HALLUCINATIONS: 0
FEVER: 0
WEAKNESS: 0
DIZZINESS: 0
EYE ITCHING: 0

## 2020-05-26 NOTE — ED AVS SNAPSHOT
[facility]  [address] [address]  Phone:  [phone]                                    Meredith Gordon   MRN: [id number]    Department:  [facility]   Date of Visit:  5/26/2020           After Visit Summary Signature Page    I have received my discharge instructions, and my questions have been answered. I have discussed any challenges I see with this plan with the nurse or doctor.    ..........................................................................................................................................  Patient/Patient Representative Signature      ..........................................................................................................................................  Patient Representative Print Name and Relationship to Patient    ..................................................               ................................................  Date                                   Time    ..........................................................................................................................................  Reviewed by Signature/Title    ...................................................              ..............................................  Date                                               Time          22EPIC Rev 08/18        Detail Level: Zone Other (Free Text): Advised patient to have an evaluation with Hematologists. Note Text (......Xxx Chief Complaint.): This diagnosis correlates with the

## 2020-05-26 NOTE — ED PROVIDER NOTES
History     Chief Complaint   Patient presents with     Eye Problem     HPI  Meredith Gordon is a 30 year old female who was seen today for routine eye exam.  She was found to have papilledema with the right eye a bit worse than the left.  The concern of the optometrist Dr. Simon was that she might have intracranial hypertension or worse venous sinus thrombosis.  She sent her here to have MRI MRV which is currently not available this time daily.  The patient has had headaches on and off for 18 months.  She is never been treated for migraines and she is had no estrogen use history.  She is a non-smoker.  She is not sexually active.  The headaches are usually dull and can either be retro-orbital or in the back of the neck.  She has not been incapacitated by these but has continued her job as a .  She has had no stiff neck no diplopia no falls numbness tingling weakness, or incontinence.  There is no family history of any kind of intracranial problems.  She also is being treated for polycystic ovarian syndrome and also hypothyroidism due to Hashimoto's thyroiditis as well as having a history of dyslipidemia for which is not on current medications.  In the past she is also been treated for iron deficiency as well and mild depression.  The patient's headaches have not been incapacitating, and she has not had squiggly lines heralding the headaches' arrival.  She denies a history of trauma and there Is no history of exposure to anyone ill recently.   Position change has not really made a difference with her headaches.     Allergies:  Allergies   Allergen Reactions     Cats Itching     Cat/feline product derivatives       Problem List:    Patient Active Problem List    Diagnosis Date Noted     Other acne 02/14/2020     Priority: Medium     Obesity (BMI 35.0-39.9) with comorbidity (H) 11/27/2019     Priority: Medium     Adjustment disorder with depressed mood 10/17/2019     Priority: Medium     Rosacea  07/01/2019     Priority: Medium     Enlarged lymph nodes 07/01/2019     Priority: Medium     Sebaceous cyst 07/01/2019     Priority: Medium     Weight gain 07/01/2019     Priority: Medium     Hyperlipidemia LDL goal <130 06/14/2019     Priority: Medium     Iron deficiency 06/14/2019     Priority: Medium     ACP (advance care planning) 07/01/2016     Priority: Medium     Advance Care Planning 7/1/2016: ACP Review of Chart / Resources Provided:  Reviewed chart for advance care plan.  Meredith Gordon has no plan or code status on file. Discussed available resources and provided with information. Confirmed code status reflects current choices pending further ACP discussions.  Confirmed/documented legally designated decision makers.  Added by Zelda Galvan           Hypothyroidism due to Hashimoto's thyroiditis 07/01/2016     Priority: Medium     PCOS (polycystic ovarian syndrome) 08/13/2014     Priority: Medium        Past Medical History:    Past Medical History:   Diagnosis Date     B12 deficiency 6/25/2012     Hashimoto's disease 9/2/2011     Nephrolithiasis      PCOS (polycystic ovarian syndrome)        Past Surgical History:    Past Surgical History:   Procedure Laterality Date     MAMMOPLASTY REDUCTION  2015     wisdom teeth         Family History:    Family History   Problem Relation Age of Onset     Thyroid Disease Mother         hypothyroidism     Nephrolithiasis Father      Other - See Comments Sister         pcos     Family History Negative Sister      Family History Negative Brother      Family History Negative Brother      Family History Negative Brother      Family History Negative Sister      Hypothyroidism Maternal Grandmother      Thyroid Disease Maternal Grandmother      Rheumatoid Arthritis Maternal Grandfather      Diabetes Paternal Grandfather         type 2     Hypertension Paternal Grandfather        Social History:  Marital Status:  Single [1]  Social History     Tobacco Use     Smoking status:  Never Smoker     Smokeless tobacco: Never Used   Substance Use Topics     Alcohol use: Yes     Alcohol/week: 0.0 standard drinks     Frequency: Monthly or less     Comment: Occasional     Drug use: No        Medications:    acetaZOLAMIDE (DIAMOX) 250 MG tablet  ARMOUR THYROID 15 MG tablet  ARMOUR THYROID 60 MG tablet  metFORMIN (GLUCOPHAGE-XR) 500 MG 24 hr tablet  sertraline (ZOLOFT) 50 MG tablet  spironolactone (ALDACTONE) 50 MG tablet  triamcinolone (KENALOG) 0.1 % external cream  vitamin D3 (CHOLECALCIFEROL) 2000 units (50 mcg) tablet          Review of Systems   Constitutional: Negative for activity change, chills, fatigue and fever.   HENT: Negative for ear pain, rhinorrhea, sinus pain and tinnitus.    Eyes: Negative for pain, redness, itching and visual disturbance.   Respiratory: Negative for chest tightness.    Cardiovascular: Negative for chest pain, palpitations and leg swelling.   Gastrointestinal: Negative for abdominal pain, anal bleeding and diarrhea.   Endocrine: Negative for heat intolerance and polyuria.   Genitourinary: Negative for pelvic pain and vaginal bleeding.   Musculoskeletal: Negative for back pain and gait problem.   Allergic/Immunologic: Negative for environmental allergies, food allergies and immunocompromised state.   Neurological: Positive for headaches. Negative for dizziness, seizures and weakness.   Psychiatric/Behavioral: Negative for behavioral problems and hallucinations.       Physical Exam   BP: (!) 143/105  Pulse: 99  Temp: 98.8  F (37.1  C)  Resp: 16  SpO2: 99 %      Physical Exam  Constitutional:       Comments: Mildly obese pleasant patient, a good historian   HENT:      Head: Normocephalic and atraumatic.      Right Ear: Ear canal normal.      Left Ear: Ear canal normal.      Nose: Nose normal.      Mouth/Throat:      Mouth: Mucous membranes are moist.      Pharynx: Oropharynx is clear.   Eyes:      Extraocular Movements: Extraocular movements intact.       Conjunctiva/sclera: Conjunctivae normal.      Pupils: Pupils are equal, round, and reactive to light.      Comments: Visual fields are full to confrontation.  Extraocular movements are normal.  There is no blood and thunder hemorrhage that I noted in either fundus but I do note the papilledema on the right, and a little on the left as well. Her pupils are dilated ( from the optometrist's office ) and she does have some reactivity.      Neck:      Musculoskeletal: No neck rigidity or muscular tenderness.      Vascular: No carotid bruit.      Comments: No thyromegaly, no lid lag or stare. No pretibial myxedema  Cardiovascular:      Rate and Rhythm: Normal rate.      Heart sounds: Normal heart sounds. No murmur. No friction rub.   Pulmonary:      Effort: No respiratory distress.      Breath sounds: No stridor. No wheezing.   Chest:      Chest wall: No tenderness.   Abdominal:      General: There is no distension.      Tenderness: There is no abdominal tenderness. There is no right CVA tenderness or guarding.   Musculoskeletal: Normal range of motion.         General: No swelling, deformity or signs of injury.      Right lower leg: No edema.   Lymphadenopathy:      Cervical: No cervical adenopathy.   Skin:     General: Skin is warm and dry.      Capillary Refill: Capillary refill takes less than 2 seconds.      Coloration: Skin is not jaundiced or pale.      Findings: No bruising, erythema or rash.   Neurological:      General: No focal deficit present.      Mental Status: She is alert and oriented to person, place, and time.      Cranial Nerves: No cranial nerve deficit.      Motor: No weakness.      Coordination: Coordination normal.   Psychiatric:         Mood and Affect: Mood normal.         Behavior: Behavior normal.         Thought Content: Thought content normal.         Judgment: Judgment normal.     1905  I spoke to Dr. Castillo neurologist for [facility] and he rec at least doing CT now and asking radiology which   For protocol at least to look for her to have CSVT excluded and put her on diamox tonight to prevent visual worsening.   19015  I spoke to Dr Rasmussen for radiology who suggested CT non con and CT venography  ED Course      Dr Castillo wanted me to start acetazolamide 500 mg bid tonight, will hold til after she has the contrast as I am giving some IV fluids pre CT.  diff dx cerebral venous thrombosis, ICH ( pseudotumor  Cerebri Pt signed out to  Dr. Peng to eval and start treatment and probably perform the LP. Referral consult was Dr Castillo at [facility]      2100  Patient's care transferred to this MD at 2100. Awaiting results of CT.    2145  CT returns without acute pathology. Patient discussed with Dr. Castillo, [facility] who states that patient should have a lumbar puncture this evening or tomorrow. Patient consented for procedure.    2145  Unable to palpate landmarks for lumbar puncture. Will page CRNA for further assistance with lumbar puncture.    2245  CRNA completed lumbar puncture, but procedure was performed in sitting position (opening pressure of 46 cm H2O). Unclear how this might correlate with an opening pressure in the left lateral decubitus position. Patient will continue treatment with Diamox with plan for neurology follow-up.    2315  Patient had transient nausea following the lumbar puncture that was relieved by ondansetron.    2330  Dr. Castillo updated regarding opening pressure in seated position. He agrees that this pressure is inaccurate, although the high number still likely represents increased intracranial pressure. He recommends continuing with acetazolamide 500 mg bid.      Procedures              Results for orders placed or performed during the hospital encounter of 05/26/20 (from the past 24 hour(s))   Erythrocyte sedimentation rate auto   Result Value Ref [place]    Sed Rate 20 0 - 20 mm/h   UA reflex to Microscopic and Culture    Specimen: Midstream Urine   Result Value Ref [place]     Color Urine Straw     Appearance Urine Clear     Glucose Urine Negative NEG^Negative mg/dL    Bilirubin Urine Negative NEG^Negative    Ketones Urine Negative NEG^Negative mg/dL    Specific Gravity Urine 1.006 1.003 - 1.035    Blood Urine Negative NEG^Negative    pH Urine 6.0 4.7 - 8.0 pH    Protein Albumin Urine Negative NEG^Negative mg/dL    Urobilinogen mg/dL Normal 0.0 - 2.0 mg/dL    Nitrite Urine Negative NEG^Negative    Leukocyte Esterase Urine Small (A) NEG^Negative    Source Midstream Urine     RBC Urine 1 0 - 2 /HPF    WBC Urine 4 0 - 5 /HPF    Bacteria Urine Moderate (A) NEG^Negative /HPF   HCG qualitative urine   Result Value Ref [place]    HCG Qual Urine Negative NEG^Negative   CBC with platelets differential   Result Value Ref [place]    WBC 11.4 (H) 4.0 - 11.0 10e9/L    RBC Count 4.84 3.8 - 5.2 10e12/L    Hemoglobin 12.9 11.7 - 15.7 g/dL    Hematocrit 39.9 35.0 - 47.0 %    MCV 82 78 - 100 fl    MCH 26.7 26.5 - 33.0 pg    MCHC 32.3 31.5 - 36.5 g/dL    RDW 14.2 10.0 - 15.0 %    Platelet Count 410 150 - 450 10e9/L    Diff Method Automated Method     % Neutrophils 60.0 %    % Lymphocytes 32.0 %    % Monocytes 6.0 %    % Eosinophils 2.0 %    Absolute Neutrophil 6.9 1.6 - 8.3 10e9/L    Absolute Lymphocytes 3.6 0.8 - 5.3 10e9/L    Absolute Monocytes 0.7 0.0 - 1.3 10e9/L    Absolute Eosinophils 0.2 0.0 - 0.7 10e9/L   Comprehensive metabolic panel   Result Value Ref [place]    Sodium 137 133 - 144 mmol/L    Potassium 3.8 3.4 - 5.3 mmol/L    Chloride 105 94 - 109 mmol/L    Carbon Dioxide 27 20 - 32 mmol/L    Anion Gap 5 3 - 14 mmol/L    Glucose 97 70 - 99 mg/dL    Urea Nitrogen 10 7 - 30 mg/dL    Creatinine 0.72 0.52 - 1.04 mg/dL    GFR Estimate >90 >60 mL/min/[1.73_m2]    GFR Estimate If Black >90 >60 mL/min/[1.73_m2]    Calcium 9.4 8.5 - 10.1 mg/dL    Bilirubin Total 0.4 0.2 - 1.3 mg/dL    Albumin 4.3 3.4 - 5.0 g/dL    Protein Total 8.4 6.8 - 8.8 g/dL    Alkaline Phosphatase 79 40 - 150 U/L    ALT 23 0 - 50 U/L     AST 12 0 - 45 U/L   INR   Result Value Ref [place]    INR 0.94 0.86 - 1.14   TSH with free T4 reflex   Result Value Ref [place]    TSH 8.63 (H) 0.40 - 4.00 mU/L   T4 free   Result Value Ref [place]    T4 Free 0.67 (L) 0.76 - 1.46 ng/dL   CT Head w/o & w Contrast    Narrative    EXAMINATION: CT HEAD W/O & W CONTRAST, 5/26/2020 8:39 PM    HISTORY: headaches for 18 months with papilledema    COMPARISON: None    TECHNIQUE:  Helical CT images of the head were obtained without and  with IV contrast. Contrast dose: [id number]- 100ml given. 45 seconds  and 5 minute delayed images acquired. 2 dimension reformatted images  acquired.    FINDINGS: No intra-axial or extra-axial mass or hemorrhage. No midline  shift. Normal CSF space and gray-white differentiation. No abnormal  enhancement. Venous sinuses appear grossly normal. The orbits appear  normal. No fracture identified. The visualized paranasal sinuses  appear normal.      Impression    IMPRESSION: Normal CT of the head without and with contrast. NO dural  sinus thrombosis identified. MRI would be more sensitive way to  evaluate for venous thrombosis.    CEDRICK WEBER MD   Glucose CSF:   Result Value Ref [place]    Glucose CSF 62 40 - 70 mg/dL   Protein total CSF:   Result Value Ref [place]    Protein Total CSF 49 15 - 60 mg/dL       Medications   ondansetron (ZOFRAN) injection 4 mg (has no administration in time range)   0.9% sodium chloride BOLUS (0 mLs Intravenous Stopped 5/26/20 2232)   iopamidol (ISOVUE-300) IV solution 61% 100 mL (100 mLs Intravenous Given 5/26/20 2026)   sodium chloride (PF) 0.9% PF flush 60 mL (50 mLs Intravenous Given 5/26/20 2026)   acetaZOLAMIDE (DIAMOX) tablet 500 mg (500 mg Oral Given 5/26/20 2302)       Assessments & Plan (with Medical Decision Making)   The patient is a 30 year old woman with papilledema with unclear etiology.    1) Papilledema - unclear etiology. No anemia, leukocytosis or acute renal/hepatic abnormality. Normal lactic acid.  Elevated TSH. No evidence of mass or other acute intracranial pathology on CT. Lumbar puncture reveals an opening pressure of 46 cm H2O, but this is in the seated position (not left lateral decubitus). Unclear how this might correlate with an opening pressure in the left lateral decubitus position. Patient will continue treatment with Diamox with plan to call Dr. Castillo, [facility], tomorrow at [phone]. Patient will be treated with rest and oral fluids.    Plan for PCP follow-up in 5 - 7 days regarding this ER visit. The patient verbalizes agreement with this plan as well as to immediately return to the ER with any new/worsening S/Sx.      I have reviewed the nursing notes.    I have reviewed the findings, diagnosis, plan and need for follow up with the patient.      New Prescriptions    ACETAZOLAMIDE (DIAMOX) 250 MG TABLET    Take 2 tablets (500 mg) by mouth 2 times daily       Final diagnoses:   Papilledema         5/26/2020   [facility]     Lui Peng MD  05/26/20 1143       Lui Peng MD  05/26/20 4758

## 2020-05-26 NOTE — ED NOTES
"Pt to the ED from an optometrist appt - routine.  States she was sent here for \"bilateral optic nerve puffy\".  Pt is denying any pain at this time.  No vision changes.  States occasional headaches.  Pt is assessed and sitting on cart.   "

## 2020-05-27 ENCOUNTER — TELEPHONE (OUTPATIENT)
Dept: FAMILY MEDICINE | Facility: OTHER | Age: 30
End: 2020-05-27

## 2020-05-27 DIAGNOSIS — H47.11 PAPILLEDEMA OF BOTH EYES DUE TO INCREASED INTRACRANIAL PRESSURE: Primary | ICD-10-CM

## 2020-05-27 LAB
APPEARANCE CSF: CLEAR
COLOR CSF: COLORLESS
GRAM STN SPEC: NORMAL
GRAM STN SPEC: NORMAL
RBC # CSF MANUAL: 34 /UL (ref 0–2)
SPECIMEN SOURCE: NORMAL
TUBE # CSF: 4 #
WBC # CSF MANUAL: 0 /UL (ref 0–5)

## 2020-05-27 NOTE — ANESTHESIA POSTPROCEDURE EVALUATION
Patient: Meredith Gordon    * No procedures listed *    Diagnosis:* No pre-op diagnosis entered *  Diagnosis Additional Information: No value filed.    Anesthesia Type:  No value filed.    Note:  Anesthesia Post Evaluation    Patient location during evaluation: ED and Bedside  Patient participation: Able to fully participate in evaluation  Level of consciousness: awake and alert  Pain management: adequate  Airway patency: patent  Cardiovascular status: acceptable  Respiratory status: acceptable  Hydration status: stable  PONV: none     Anesthetic complications: None          Last vitals:  Vitals:    05/26/20 1745   BP: (!) 143/105   Pulse: 99   Resp: 16   Temp: 98.8  F (37.1  C)   SpO2: 99%         Electronically Signed By: KATIE Langley CRNA  May 26, 2020  11:07 PM

## 2020-05-27 NOTE — ED NOTES
To the bedside with MD and consent is signed for lumbar puncture.  Pt into gown and left side lying.

## 2020-05-27 NOTE — ANESTHESIA PROCEDURE NOTES
Procedure note : lumbar puncture  Staff -       CRNA: Geoff Bermudez APRN CRNA    Performed By: CRNA    Referred By: Dr. Peng    Pre-Procedure  Performed by   Referred by Dr. Peng  Location: ED    Procedure Times:5/26/2020 10:30 PM and 5/26/2020 10:55 PM  Pre-Anesthestic Checklist: patient identified, IV checked, site marked, risks and benefits discussed, informed consent, monitors and equipment checked, pre-op evaluation and at physician/surgeon's request    Timeout  Correct Patient: Yes   Correct Procedure: Yes   Correct Site: Yes   Correct Laterality: N/A   Correct Position: Yes   Site Marked: Yes   .   Procedure Documentation  ASA 2  Diagnosis:optical exam changes.    Procedure: lumbar puncture, .   Patient Position:sitting Insertion Site:L3-4  (midline approach)     Patient Prep/Sterile Barriers; povidone-iodine 7.5% surgical scrub.  .  Needle:  Spinal Needle (gauge): 17  Spinal/LP Needle Length (inches): 3.5 # of attempts: 1 and # of redirects:  No introducer used .        Assessment/Narrative  Paresthesias: No.  .  .  8 mL of clear CSF fluid removed Opening pressure was 46 cmH2O.   . Time Injected:while sitting   Comments:  Consented by Dr. Peng. Discussed risks prior to procedure. Pt states understanding. Time out performed. Sterility maintained throughout procedure. No complications.

## 2020-05-27 NOTE — ANESTHESIA PREPROCEDURE EVALUATION
"Anesthesia Pre-Procedure Evaluation    Patient: Meredith Gordon   MRN: [id number] : 1990          Preoperative Diagnosis: * No pre-op diagnosis entered *    * No procedures listed *    Past Medical History:   Diagnosis Date     B12 deficiency 2012    resolved     Hashimoto's disease 2011     Nephrolithiasis     2011 in [facility] (at [facility])     PCOS (polycystic ovarian syndrome)      Past Surgical History:   Procedure Laterality Date     MAMMOPLASTY REDUCTION  2015     wisdom teeth                          Lab Results   Component Value Date    WBC 11.4 (H) 2020    HGB 12.9 2020    HCT 39.9 2020     2020    SED 20 2020     2020    POTASSIUM 3.8 2020    CHLORIDE 105 2020    CO2 27 2020    BUN 10 2020    CR 0.72 2020    GLC 97 2020    COOPER 9.4 2020    ALBUMIN 4.3 2020    PROTTOTAL 8.4 2020    ALT 23 2020    AST 12 2020    ALKPHOS 79 2020    BILITOTAL 0.4 2020    INR 0.94 2020    TSH 8.63 (H) 2020    T4 0.67 (L) 2020    HCG Negative 2020       Preop Vitals  BP Readings from Last 3 Encounters:   20 (!) 143/105   20 110/62   19 100/62    Pulse Readings from Last 3 Encounters:   20 99   20 84   19 76      Resp Readings from Last 3 Encounters:   20 16   20 18   10/17/19 20    SpO2 Readings from Last 3 Encounters:   20 99%   20 98%   19 98%      Temp Readings from Last 1 Encounters:   20 98.8  F (37.1  C) (Tympanic)    Ht Readings from Last 1 Encounters:   19 1.651 m (5' 5\")      Wt Readings from Last 1 Encounters:   20 98.7 kg (217 lb 9.6 oz)    Estimated body mass index is 36.21 kg/m  as calculated from the following:    Height as of 19: 1.651 m (5' 5\").    Weight as of 20: 98.7 kg (217 lb 9.6 oz).       Anesthesia Plan  Procedure only, no anesthetic " delivered             Postoperative Care      Consents                 KATIE Langley CRNA

## 2020-05-27 NOTE — ED NOTES
Face to face report given with opportunity to observe patient.    Report given to Simone Hernandez RN   5/26/2020  11:12 PM

## 2020-05-27 NOTE — ED NOTES
Pt had dizziness and and nausea towards end of procedure.  Nausea subsided and then dizziness when pt laid flat.

## 2020-05-27 NOTE — ED NOTES
Gave consent form to Dr Peng.  MD will speak to pt regarding LP. MD states to give Diamox after LP.

## 2020-05-27 NOTE — ED NOTES
Per Dr Peng, pt has decided to hold off on lumbar puncture at this time.  MD calling neurology and will let me know to give diamox or not.

## 2020-05-27 NOTE — DISCHARGE INSTRUCTIONS
Thank you for allowing [facility] to care for you today. You have been diagnosed with papilledema (swelling behind your eyes) and underwent evaluation with CT of the brain which returned normal. Your lumbar puncture revealed and elevated pressure in your spinal cord and further evaluation with MRI as well as a Neurologist is recommended. Tomorrow, marina call Dr. Castillo, [facility], at [phone] to arrange a follow-up appointment.    If you develop a headache following your lumbar puncture, please alternate Tylenol 650 mg and ibuprofen 600 mg so that you are taking one of these medications every 3 hours as needed for pain management. Apply a warm or cold compress to your lower back for additional relief.    Please also follow-up with your family doctor in 5 - 7 days regarding this emergency department visit. However, immediately return to the emergency department with any new or worsening signs, symptoms or questions.    Michael Peng MD        What to expect when you have contrast    During your exam, we will inject  contrast  into your vein or artery. (Contrast is a clear liquid with iodine in it. It shows up on X-rays.)    You may feel warm or hot. You may have a metal taste in your mouth and a slight upset stomach. You may also feel pressure near the kidneys and bladder. These effects will last about 1 to 3 minutes.    Please tell us if you have:   Sneezing    Itching   Hives    Swelling in the face   A hoarse voice   Breathing problems   Other new symptoms    Serious problems are rare.  They may include:   Irregular heartbeat    Seizures   Kidney failure             Tissue damage   Shock     Death    If you have any problems during the exam, we  will treat them right away.    When you get home    Call your hospital if you have any new symptoms in the next 2 days, like hives or swelling. (Phone numbers are at the bottom of this page.) Or call your family doctor.     If  you have wheezing or trouble breathing, call 911.    Self-care  -Drink at least 4 extra glasses of water today.   This reduces the stress on your kidneys.  -Keep taking your regular medicines.    The contrast will pass out of your body in your  Urine(pee). This will happen in the next 24 hours. You  will not feel this. Your urine will not  change color.    If you have kidney problems or take metformin    Drink 4 to 8 large glasses of water for the next  2 days, if you are not on a fluid restriction.    ?If you take metformin (Glucophage or Glucovance) for diabetes, keep taking it.      ?Your kidney function tests are abnormal.  If you take Metformin, do not take it for 48 hours. Please go to your clinic for a blood test within 3 days after your exam before the restarting this medicine.     (Note to provider:please give patient prescription for lab tests.)    ?Special instructions: Drink an extra 4 glasses of water to flush out the contrast.     I have read and understand the above information.    Patient Sign Here:______________________________________Date:________Time:______    Staff Sign Here:________________________________________Date:_______Time:______      Radiology Departments:     ?[facility]: [phone] ?[facility]: [phone]     ?[place]: [phone] ?[facility]:[phone]      ?[place]: [phone]  ?[place]: [phone]  ?[place]:[phone]    ?[facility]:[phone]  ?[facility]:[phone]

## 2020-05-27 NOTE — TELEPHONE ENCOUNTER
Pt called, reports that she was seen an [facility] yesterday and needed some further testing. Pt reports that she was told by eye clinic staff that she needs a referral. Called and spoke with staff at [facility]. Pt needs optometry referral per her insurance. Pended. Please advise.     [facility] fax [phone]

## 2020-06-01 ENCOUNTER — OFFICE VISIT (OUTPATIENT)
Dept: FAMILY MEDICINE | Facility: OTHER | Age: 30
End: 2020-06-01
Attending: FAMILY MEDICINE
Payer: COMMERCIAL

## 2020-06-01 VITALS
WEIGHT: 208 LBS | OXYGEN SATURATION: 99 % | HEIGHT: 65 IN | HEART RATE: 85 BPM | TEMPERATURE: 98.4 F | DIASTOLIC BLOOD PRESSURE: 60 MMHG | SYSTOLIC BLOOD PRESSURE: 110 MMHG | BODY MASS INDEX: 34.66 KG/M2

## 2020-06-01 DIAGNOSIS — E66.01 MORBID OBESITY (H): ICD-10-CM

## 2020-06-01 DIAGNOSIS — E06.3 HYPOTHYROIDISM DUE TO HASHIMOTO'S THYROIDITIS: ICD-10-CM

## 2020-06-01 DIAGNOSIS — G97.1 SPINAL PUNCTURE HEADACHE: ICD-10-CM

## 2020-06-01 DIAGNOSIS — H47.10 PAPILLEDEMA OF BOTH EYES: Primary | ICD-10-CM

## 2020-06-01 PROCEDURE — 99214 OFFICE O/P EST MOD 30 MIN: CPT | Performed by: FAMILY MEDICINE

## 2020-06-01 RX ORDER — ACETAZOLAMIDE 250 MG/1
500 TABLET ORAL 2 TIMES DAILY
Qty: 360 TABLET | Refills: 0 | Status: SHIPPED | OUTPATIENT
Start: 2020-06-01 | End: 2020-09-25

## 2020-06-01 RX ORDER — THYROID 30 MG/1
30 TABLET ORAL DAILY
Qty: 90 TABLET | Refills: 0 | Status: SHIPPED | OUTPATIENT
Start: 2020-06-01 | End: 2020-08-13

## 2020-06-01 RX ORDER — THYROID 60 MG/1
TABLET ORAL
Qty: 90 TABLET | Refills: 0 | Status: SHIPPED | OUTPATIENT
Start: 2020-06-01 | End: 2020-11-11

## 2020-06-01 ASSESSMENT — PAIN SCALES - GENERAL: PAINLEVEL: MILD PAIN (3)

## 2020-06-01 ASSESSMENT — MIFFLIN-ST. JEOR: SCORE: 1669.36

## 2020-06-01 NOTE — PROGRESS NOTES
Subjective     Meredith Gordon is a 30 year old female who presents to clinic today for the following health issues:    HPI   ED/UC Followup:    Facility:  [facility]  Date of visit: 5/26/20  Reason for visit: papilledema  Current Status: has MRI scheduled for tomorrow, had CT scan and spinal tap        Hypothyroidism Follow-up      Since last visit, patient describes the following symptoms: Weight stable and has lost weight but maybe not as much as she would like, no hair loss, no skin changes, no constipation, no loose stools and depression      Patient Active Problem List   Diagnosis     PCOS (polycystic ovarian syndrome)     ACP (advance care planning)     Hypothyroidism due to Hashimoto's thyroiditis     Hyperlipidemia LDL goal <130     Iron deficiency     Rosacea     Enlarged lymph nodes     Sebaceous cyst     Weight gain     Adjustment disorder with depressed mood     Obesity (BMI 35.0-39.9) with comorbidity (H)     Other acne     Papilledema of both eyes     Past Surgical History:   Procedure Laterality Date     MAMMOPLASTY REDUCTION  2015     wisdom teeth         Social History     Tobacco Use     Smoking status: Never Smoker     Smokeless tobacco: Never Used   Substance Use Topics     Alcohol use: Yes     Alcohol/week: 0.0 standard drinks     Frequency: Monthly or less     Comment: Occasional     Family History   Problem Relation Age of Onset     Thyroid Disease Mother         hypothyroidism     Nephrolithiasis Father      Other - See Comments Sister         pcos     Family History Negative Sister      Family History Negative Brother      Family History Negative Brother      Family History Negative Brother      Family History Negative Sister      Hypothyroidism Maternal Grandmother      Thyroid Disease Maternal Grandmother      Rheumatoid Arthritis Maternal Grandfather      Diabetes Paternal Grandfather         type 2     Hypertension Paternal Grandfather          Current Outpatient Medications   Medication Sig  "Dispense Refill     acetaZOLAMIDE (DIAMOX) 250 MG tablet Take 2 tablets (500 mg) by mouth 2 times daily 360 tablet 0     metFORMIN (GLUCOPHAGE-XR) 500 MG 24 hr tablet Take 2 Tabs by mouth at bedtime. 180 tablet 3     sertraline (ZOLOFT) 50 MG tablet Take 1 tablet (50 mg) by mouth daily 90 tablet 2     spironolactone (ALDACTONE) 50 MG tablet Take 1 tablet (50 mg) by mouth 2 times daily 120 tablet 1     thyroid (ARMOUR THYROID) 60 MG tablet Take 1 tablet (60 mg) by mouth daily Take in addition to the 30 mg daily. 90 tablet 0     thyroid (ARMOUR) 30 MG tablet Take 1 tablet (30 mg) by mouth daily 90 tablet 0     triamcinolone (KENALOG) 0.1 % external cream Apply topically 2 times daily 45 g 1     vitamin D3 (CHOLECALCIFEROL) 2000 units (50 mcg) tablet Take 2 tablets (4,000 Units) by mouth daily 90 tablet 1     Allergies   Allergen Reactions     Cats Itching     Cat/feline product derivatives     BP Readings from Last 3 Encounters:   06/01/20 110/60   05/26/20 109/72   02/14/20 110/62    Wt Readings from Last 3 Encounters:   06/01/20 94.3 kg (208 lb)   02/14/20 98.7 kg (217 lb 9.6 oz)   11/27/19 98 kg (216 lb)      Reviewed and updated as needed this visit by Provider         Review of Systems   Constitutional, HEENT, cardiovascular, pulmonary, gi and gu systems are negative, except as otherwise noted.      Objective    /60   Pulse 85   Temp 98.4  F (36.9  C)   Ht 1.651 m (5' 5\")   Wt 94.3 kg (208 lb)   SpO2 99%   BMI 34.61 kg/m    Body mass index is 34.61 kg/m .  Physical Exam   GENERAL: healthy, alert and no distress  NECK: no adenopathy, no asymmetry, masses, or scars and thyroid normal to palpation  RESP: lungs clear to auscultation - no rales, rhonchi or wheezes  CV: regular rate and rhythm, normal S1 S2, no S3 or S4, no murmur, click or rub, no peripheral edema and peripheral pulses strong  ABDOMEN: soft, nontender, no hepatosplenomegaly, no masses and bowel sounds normal  MS: no gross musculoskeletal " "defects noted, no edema  PSYCH: mentation appears normal, affect normal/bright    Diagnostic Test Results:  Labs reviewed in Epic  No results found for any visits on 06/01/20.        Assessment & Plan     (H47.10) Papilledema of both eyes  (primary encounter diagnosis)  Comment:   Plan: NEUROLOGY ADULT REFERRAL, acetaZOLAMIDE         (DIAMOX) 250 MG tablet        Discussed in some detail  Advised she see neurology to further information  She is have MRI tomorrow, will determine need for MRA  Looks like obesity is her main risk factor (no family history--but lots of autoimmune issues)    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 thyroiditis  Comment:   Plan: thyroid (ARMOUR THYROID) 60 MG tablet, thyroid         (ARMOUR) 30 MG tablet, TSH with free T4 reflex,        T3, Free        Will likely need to run tsh a little lower to help with her thyroid mgmt    (E66.01) Morbid obesity (H)  Comment:   Plan: encourage her to continue working on weight    Spinal headache -- she declines referral to anesthesia for blood patch, reports headaches are progressively better.     BMI:   Estimated body mass index is 34.61 kg/m  as calculated from the following:    Height as of this encounter: 1.651 m (5' 5\").    Weight as of this encounter: 94.3 kg (208 lb).   Weight management plan: Discussed healthy diet and exercise guidelines      Patient was agreeable to this plan and had no further questions.  There are no Patient Instructions on file for this visit.    No follow-ups on file.    Jovana Lambert MD  [facility] [place]      "

## 2020-06-01 NOTE — NURSING NOTE
"Chief Complaint   Patient presents with     Eye Problem       Initial /60   Pulse 85   Temp 98.4  F (36.9  C)   Ht 1.651 m (5' 5\")   Wt 94.3 kg (208 lb)   SpO2 99%   BMI 34.61 kg/m   Estimated body mass index is 34.61 kg/m  as calculated from the following:    Height as of this encounter: 1.651 m (5' 5\").    Weight as of this encounter: 94.3 kg (208 lb).  Medication Reconciliation: complete  Robin Leon LPN  "

## 2020-06-02 ENCOUNTER — HOSPITAL ENCOUNTER (OUTPATIENT)
Dept: MRI IMAGING | Facility: HOSPITAL | Age: 30
Discharge: HOME | End: 2020-06-02
Attending: FAMILY MEDICINE | Admitting: FAMILY MEDICINE
Payer: COMMERCIAL

## 2020-06-02 DIAGNOSIS — H47.10 PAPILLEDEMA: ICD-10-CM

## 2020-06-02 LAB
BACTERIA SPEC CULT: NORMAL
SPECIMEN SOURCE: NORMAL

## 2020-06-02 PROCEDURE — 25500064 ZZH RX 255 OP 636: Performed by: RADIOLOGY

## 2020-06-02 PROCEDURE — A9585 GADOBUTROL INJECTION: HCPCS | Performed by: RADIOLOGY

## 2020-06-02 PROCEDURE — 70553 MRI BRAIN STEM W/O & W/DYE: CPT | Mod: TC

## 2020-06-02 RX ORDER — GADOBUTROL 604.72 MG/ML
2 INJECTION INTRAVENOUS ONCE
Status: COMPLETED | OUTPATIENT
Start: 2020-06-02 | End: 2020-06-02

## 2020-06-02 RX ORDER — GADOBUTROL 604.72 MG/ML
7.5 INJECTION INTRAVENOUS ONCE
Status: COMPLETED | OUTPATIENT
Start: 2020-06-02 | End: 2020-06-02

## 2020-06-02 RX ADMIN — GADOBUTROL 2 ML: 604.72 INJECTION INTRAVENOUS at 13:19

## 2020-06-02 RX ADMIN — GADOBUTROL 7.5 ML: 604.72 INJECTION INTRAVENOUS at 13:19

## 2020-07-01 DIAGNOSIS — E28.2 PCOS (POLYCYSTIC OVARIAN SYNDROME): ICD-10-CM

## 2020-07-02 RX ORDER — METFORMIN HCL 500 MG
TABLET, EXTENDED RELEASE 24 HR ORAL
Qty: 180 TABLET | Refills: 3 | Status: SHIPPED | OUTPATIENT
Start: 2020-07-02 | End: 2021-06-08

## 2020-08-12 DIAGNOSIS — E06.3 HYPOTHYROIDISM DUE TO HASHIMOTO'S THYROIDITIS: ICD-10-CM

## 2020-08-12 NOTE — TELEPHONE ENCOUNTER
Thyroid      Last Written Prescription Date:  6.1.2020  Last Fill Quantity: 90,   # refills: 0  Last Office Visit: 06.01.2020

## 2020-08-13 RX ORDER — THYROID 30 MG/1
TABLET ORAL
Qty: 90 TABLET | Refills: 0 | Status: SHIPPED | OUTPATIENT
Start: 2020-08-13 | End: 2020-11-11

## 2020-08-13 NOTE — TELEPHONE ENCOUNTER
Jud Thyroid 30 MG Oral Tablet (thyroid)     Thyroid Protocol Failed    Normal TSH on file in past 12 months    TSH   Date Value Ref Range Status   05/26/2020 8.63 (H) 0.40 - 4.00 mU/L Final     Next 5 appointments (look out 90 days)    Aug 14, 2020  4:00 PM CDT  (Arrive by 3:45 PM)  SHORT with Jovana Lambert MD  [facility] ([facility] ) [address]  [place] [address]  [phone]        Lab is scheduled prior to 8/14/20 appt.

## 2020-08-14 ENCOUNTER — APPOINTMENT (OUTPATIENT)
Dept: LAB | Facility: OTHER | Age: 30
End: 2020-08-14
Attending: FAMILY MEDICINE
Payer: COMMERCIAL

## 2020-08-14 ENCOUNTER — OFFICE VISIT (OUTPATIENT)
Dept: FAMILY MEDICINE | Facility: OTHER | Age: 30
End: 2020-08-14
Attending: FAMILY MEDICINE
Payer: COMMERCIAL

## 2020-08-14 VITALS
TEMPERATURE: 98 F | SYSTOLIC BLOOD PRESSURE: 110 MMHG | BODY MASS INDEX: 33.32 KG/M2 | HEART RATE: 105 BPM | OXYGEN SATURATION: 97 % | DIASTOLIC BLOOD PRESSURE: 68 MMHG | WEIGHT: 200 LBS | HEIGHT: 65 IN

## 2020-08-14 DIAGNOSIS — E28.2 PCOS (POLYCYSTIC OVARIAN SYNDROME): Primary | ICD-10-CM

## 2020-08-14 DIAGNOSIS — E06.3 HYPOTHYROIDISM DUE TO HASHIMOTO'S THYROIDITIS: ICD-10-CM

## 2020-08-14 DIAGNOSIS — L65.9 HAIR LOSS: ICD-10-CM

## 2020-08-14 LAB
IRON SATN MFR SERPL: 9 % (ref 15–46)
IRON SERPL-MCNC: 34 UG/DL (ref 35–180)
TIBC SERPL-MCNC: 383 UG/DL (ref 240–430)
TSH SERPL DL<=0.005 MIU/L-ACNC: 0.68 MU/L (ref 0.4–4)

## 2020-08-14 PROCEDURE — 99214 OFFICE O/P EST MOD 30 MIN: CPT | Performed by: FAMILY MEDICINE

## 2020-08-14 PROCEDURE — 83540 ASSAY OF IRON: CPT | Performed by: FAMILY MEDICINE

## 2020-08-14 PROCEDURE — 36415 COLL VENOUS BLD VENIPUNCTURE: CPT | Performed by: FAMILY MEDICINE

## 2020-08-14 PROCEDURE — 84443 ASSAY THYROID STIM HORMONE: CPT | Performed by: FAMILY MEDICINE

## 2020-08-14 PROCEDURE — 83550 IRON BINDING TEST: CPT | Performed by: FAMILY MEDICINE

## 2020-08-14 PROCEDURE — 84481 FREE ASSAY (FT-3): CPT | Performed by: FAMILY MEDICINE

## 2020-08-14 ASSESSMENT — MIFFLIN-ST. JEOR: SCORE: 1633.07

## 2020-08-14 ASSESSMENT — PAIN SCALES - GENERAL: PAINLEVEL: NO PAIN (0)

## 2020-08-14 NOTE — PATIENT INSTRUCTIONS
chelsea brand that's good  [facility] -- 1 capsule 2x/day for 6 months       Psychologists/ Counselors      [place]  [place]   [phone]  [facility]   [phone]  [facility] [phone]  [facility] (kids) [phone]  [facility](teens) [phone]  [facility]  [phone]  [facility]  [phone]    [facility]  [phone]  [facility]  [phone]  [facility]  [phone]  [facility] [phone]     [facility]   [phone]   [facility]    [phone]   [facility]   [phone]   (Zina Peñaloza)     [facility]  [phone]  [facility] [phone]  [facility]  [phone]  [facility]  [phone]     [facility] [phone]      [facility] counseling [phone]  [facility]   [phone]      [facility] (Simone Bynum LMJENNIFER)                                                   [phone]  Leslie Pedroza  [phone]  [facility] [phone]      [facility]      [phone]  [facility]       [phone]  [facility]  [phone]  [facility]      [phone]     [place]  Hari Joe   [phone]  [facility]      [phone]  [facility] SATURNINO Omalley [phone]  [facility]      [phone]  [facility]  [phone]    [facility]                      [phone]    *Facilities in bold italics indicate medication management  Services are offered.    Crisis support  Mobile crisis line- [phone]  [facility]: Free online resources including therapy for Mental Health and substance use problems: Http://thriverange.org    Crisis Text Line  Text HOME to [phone] - The Crisis Text Line serves anyone, in any type of crisis, providing access to free,  24/7 support and information via the medium people already use and trust  http://www.crisistextline.org   Here's how it works:  Text HOME to 886-398 from anywhere in the [place], anytime, about any type of crisis.  A live, trained Crisis Counselor receives the text and responds quickly.  The volunteer Crisis Counselor will help you move from a 'hot moment to a cool moment'

## 2020-08-14 NOTE — PROGRESS NOTES
Clinic Visit  Date of visit: 2020   Chief Complaint: No chief complaint on file.      HPI:   Meredith Gordon is a 30 year old  female who presents to clinic today for follow up  for PCOS .     Menarche: age 14  Menses: frequency: every 26 days and length: 5 days. No LMP recorded. (Menstrual status: Irregular Periods).   Family Planning Chart: No.  Acne: Yes: .  Hirsutism (facial hair): No.  Male-pattern hair loss: No.  Elevated serum androgen: No results found for: DHEA, TESTOSTTOTAL, FT, JOVITA]  BMI: There is no height or weight on file to calculate BMI.   Type 2 DM: No.  Elevated Cholesterol: No.  Mood disorder: Yes: ROBBIE.    Gynecologic History:  Last Pap:   Lab Results   Component Value Date    PAP NIL 2016      History of abnormal pap: No.    Obstetric History:   OB History    Para Term  AB Living   0 0 0 0 0 0   SAB TAB Ectopic Multiple Live Births   0 0 0 0 0     Obstetric history significant for:    NA    Depression and Anxiety Follow-Up    How are you doing with your depression since your last visit? Worsened -- several concerns    How are you doing with your anxiety since your last visit?  Worsened not sleeping well at night    Are you having other symptoms that might be associated with depression or anxiety? Yes:  thinking about a lot of things    Have you had a significant life event? Relationship Concerns, Job Concerns, Health Concerns and OTHER: friends child had major accident     Do you have any concerns with your use of alcohol or other drugs? No    Social History     Tobacco Use     Smoking status: Never Smoker     Smokeless tobacco: Never Used   Substance Use Topics     Alcohol use: Yes     Alcohol/week: 0.0 standard drinks     Frequency: Monthly or less     Comment: Occasional     Drug use: No     PHQ 2019   PHQ-9 Total Score 8 8 3   Q9: Thoughts of better off dead/self-harm past 2 weeks Not at all Not at all Not at all     ROBBIE-7 SCORE  7/1/2019 11/27/2019 2/14/2020   Total Score 2 1 0     Last PHQ-9 2/14/2020   1.  Little interest or pleasure in doing things 0   2.  Feeling down, depressed, or hopeless 0   3.  Trouble falling or staying asleep, or sleeping too much 0   4.  Feeling tired or having little energy 1   5.  Poor appetite or overeating 1   6.  Feeling bad about yourself 1   7.  Trouble concentrating 0   8.  Moving slowly or restless 0   Q9: Thoughts of better off dead/self-harm past 2 weeks 0   PHQ-9 Total Score 3   Difficulty at work, home, or with people Not difficult at all     ROBBIE-7  2/14/2020   1. Feeling nervous, anxious, or on edge 0   2. Not being able to stop or control worrying 0   3. Worrying too much about different things 0   4. Trouble relaxing 0   5. Being so restless that it is hard to sit still 0   6. Becoming easily annoyed or irritable 0   7. Feeling afraid, as if something awful might happen 0   ROBBIE-7 Total Score 0   If you checked any problems, how difficult have they made it for you to do your work, take care of things at home, or get along with other people? -       Suicide Assessment Five-step Evaluation and Treatment (SAFE-T)    Hypothyroidism Follow-up      Since last visit, patient describes the following symptoms: Weight stable, no hair loss, no skin changes, no constipation, no loose stools, anxiety, depression and hair loss             Medications:  acetaZOLAMIDE (DIAMOX) 250 MG tablet, Take 2 tablets (500 mg) by mouth 2 times daily  ARMOUR THYROID 30 MG tablet, Take 1 Tab by mouth one time a day.  metFORMIN (GLUCOPHAGE-XR) 500 MG 24 hr tablet, Take 2 Tabs by mouth at bedtime.  sertraline (ZOLOFT) 50 MG tablet, Take 1 tablet (50 mg) by mouth daily  spironolactone (ALDACTONE) 50 MG tablet, Take 1 tablet (50 mg) by mouth 2 times daily  thyroid (ARMOUR THYROID) 60 MG tablet, Take 1 tablet (60 mg) by mouth daily Take in addition to the 30 mg daily.  triamcinolone (KENALOG) 0.1 % external cream, Apply topically 2  times daily  vitamin D3 (CHOLECALCIFEROL) 2000 units (50 mcg) tablet, Take 2 tablets (4,000 Units) by mouth daily    No current facility-administered medications on file prior to visit.       Allergy:  Allergies   Allergen Reactions     Cats Itching     Cat/feline product derivatives       Medical History:  Past Medical History:   Diagnosis Date     B12 deficiency 6/25/2012    resolved     Hashimoto's disease 9/2/2011     Nephrolithiasis     2011 in [facility]     PCOS (polycystic ovarian syndrome)        Surgical History:  Past Surgical History:   Procedure Laterality Date     MAMMOPLASTY REDUCTION  2015     wisdom teeth         Social History:  Social History    Substance and Sexual Activity      Alcohol use: Yes        Alcohol/week: 0.0 standard drinks        Frequency: Monthly or less        Comment: Occasional    History   Smoking Status     Never Smoker   Smokeless Tobacco     Never Used     History   Drug Use No     History   Sexual Activity     Sexual activity: Never     Relationship status is: Never been .    Lives in Stable housing and no concerns with Alone.   Employment: Employed part time  History of sexual, physical or mental abuse: No.   She denies current fears or concerns for personal safety.      Review of Systems:    GENERAL: No change in weight, sleep or appetite.  Normal energy.  No fever or chills  EYES: Negative for vision changes or eye problems  ENT: No problems with ears, nose or throat.  No difficulty swallowing.  RESP: No coughing, wheezing or shortness of breath  CV: No chest pains or palpitations  GI: No nausea, vomiting,  heartburn, abdominal pain, diarrhea, constipation or change in bowel habits  : No urinary frequency or dysuria, bladder or kidney problems  MUSCULOSKELETAL: No significant muscle or joint pains  NEUROLOGIC: No headaches, numbness, tingling, weakness, problems with balance or coordination  PSYCHIATRIC: No problems with anxiety, depression or  "mental health  HEME/IMMUNE/ALLERGY: No history of bleeding or clotting problems or anemia.  No allergies or immune system problems  ENDOCRINE: No history of thyroid disease, diabetes or other endocrine disorders  SKIN: No rashes,worrisome lesions or skin problems      Physical Exam:  Vitals:    20 1601   BP: 110/68   Pulse: 105   Temp: 98  F (36.7  C)   SpO2: 97%   Weight: 90.7 kg (200 lb)   Height: 1.651 m (5' 5\")     There is no height or weight on file to calculate BMI.    Gen: NAD, Awake and alert, cooperative with exam  CV: regular rate and rhythm  Resp: lungs clear to auscultation bilaterally  Abd: soft, nontender, nondistended, no rebound, no guarding  Extremities: nontender, no edema    Assessment/Plan:  Meredith Gordon is a 30 year old  who presents for follow up  for PCOS   (E28.2) PCOS (polycystic ovarian syndrome)  (primary encounter diagnosis)  Comment:   Plan: recommend counseling, journalling, start ashwaghanda    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 thyroiditis  Comment:   Plan: labs finally stable    (L65.9) Hair loss  Comment:   Plan: Iron and iron binding capacity        Recheck today, had fever 3-4 mos ago, thyroid adjusted    Insomnia -- discussed as above    Papilledema -- has neurology appt upcoming, discussion on questions she has     Follow up: 2 mos       Jovana Lambert MD       "

## 2020-08-14 NOTE — NURSING NOTE
"Chief Complaint   Patient presents with     Thyroid Problem       Initial /68   Pulse 105   Temp 98  F (36.7  C)   Ht 1.651 m (5' 5\")   Wt 90.7 kg (200 lb)   SpO2 97%   BMI 33.28 kg/m   Estimated body mass index is 33.28 kg/m  as calculated from the following:    Height as of this encounter: 1.651 m (5' 5\").    Weight as of this encounter: 90.7 kg (200 lb).  Medication Reconciliation: complete  Robin Leon LPN  "

## 2020-08-17 LAB — T3FREE SERPL-MCNC: 2.2 PG/ML (ref 2.3–4.2)

## 2020-08-19 RX ORDER — LIOTHYRONINE SODIUM 5 UG/1
5 TABLET ORAL DAILY
Qty: 30 TABLET | Refills: 1 | Status: SHIPPED | OUTPATIENT
Start: 2020-08-19 | End: 2020-11-11

## 2020-08-21 NOTE — ED NOTES
Informed patient of results and recommendations. Patient will think about recommendations and call office back.Results letter mailed to patient.   Pt to DI on cart via tech.

## 2020-08-26 ENCOUNTER — TRANSFERRED RECORDS (OUTPATIENT)
Dept: HEALTH INFORMATION MANAGEMENT | Facility: CLINIC | Age: 30
End: 2020-08-26

## 2020-09-25 DIAGNOSIS — H47.10 PAPILLEDEMA OF BOTH EYES: ICD-10-CM

## 2020-09-25 RX ORDER — ACETAZOLAMIDE 250 MG/1
TABLET ORAL
Qty: 360 TABLET | Refills: 0 | Status: SHIPPED | OUTPATIENT
Start: 2020-09-25 | End: 2021-11-19

## 2020-09-25 NOTE — TELEPHONE ENCOUNTER
Diamox 250 mg      Last Written Prescription Date:  6-1-2020  Last Fill Quantity: 360,   # refills: 0  Last Office Visit: 8-  Future Office visit:

## 2020-10-02 ENCOUNTER — TELEPHONE (OUTPATIENT)
Dept: FAMILY MEDICINE | Facility: OTHER | Age: 30
End: 2020-10-02

## 2020-10-02 DIAGNOSIS — H47.10 PAPILLEDEMA OF BOTH EYES: Primary | ICD-10-CM

## 2020-10-02 NOTE — TELEPHONE ENCOUNTER
Patients eye Dr appointment is 10/5 and she needs a referral from her provider to [facility].  Dr Teri Amin.  Patient is in need of field of vision due to pappiladema.    Please call her and let her know when done.

## 2020-10-23 DIAGNOSIS — L70.8 OTHER ACNE: ICD-10-CM

## 2020-10-23 DIAGNOSIS — E28.2 PCOS (POLYCYSTIC OVARIAN SYNDROME): ICD-10-CM

## 2020-10-27 RX ORDER — SPIRONOLACTONE 50 MG/1
TABLET, FILM COATED ORAL
Qty: 120 TABLET | Refills: 1 | Status: SHIPPED | OUTPATIENT
Start: 2020-10-27 | End: 2020-12-30

## 2020-11-10 DIAGNOSIS — E06.3 HYPOTHYROIDISM DUE TO HASHIMOTO'S THYROIDITIS: ICD-10-CM

## 2020-11-10 NOTE — TELEPHONE ENCOUNTER
ARMOUR THYROID 60MG      Last Written Prescription Date:  6-1-2020  Last Fill Quantity: 90,   # refills: 0  Last Office Visit: 8-  Future Office visit:       Routing refill request to provider for review/approval because:        ARMOUR THYROID 30MG      Last Written Prescription Date:  8-  Last Fill Quantity: 90,   # refills: 0  Last Office Visit: 8-  Future Office visit:       Routing refill request to provider for review/approval because:          CYTOMEL      Last Written Prescription Date:  8-  Last Fill Quantity: 30,   # refills: 1  Last Office Visit: 8-  Future Office visit:       Routing refill request to provider for review/approval because:

## 2020-11-11 RX ORDER — THYROID 60 MG/1
TABLET ORAL
Qty: 90 TABLET | Refills: 0 | Status: SHIPPED | OUTPATIENT
Start: 2020-11-11 | End: 2020-12-21

## 2020-11-11 RX ORDER — THYROID 30 MG/1
TABLET ORAL
Qty: 90 TABLET | Refills: 0 | Status: SHIPPED | OUTPATIENT
Start: 2020-11-11 | End: 2020-12-21

## 2020-11-11 RX ORDER — LIOTHYRONINE SODIUM 5 UG/1
TABLET ORAL
Qty: 30 TABLET | Refills: 1 | Status: SHIPPED | OUTPATIENT
Start: 2020-11-11 | End: 2020-12-30

## 2020-11-16 ENCOUNTER — MYC MEDICAL ADVICE (OUTPATIENT)
Dept: FAMILY MEDICINE | Facility: OTHER | Age: 30
End: 2020-11-16

## 2020-11-16 DIAGNOSIS — H47.10 PAPILLEDEMA OF BOTH EYES: Primary | ICD-10-CM

## 2020-11-18 NOTE — PROGRESS NOTES
Subjective     Meredith Gordon is a 30 year old female who presents to clinic today for the following health issues:    HPI         Migraine     Since your last clinic visit, how have your headaches changed?  Worsened and more frequent     How often are you getting headaches or migraines? Daily for the last two weeks before it was occasional      Are you able to do normal daily activities when you have a migraine? Yes but depends on the day, a few days she had to take the day off because they were so bad     Are you taking rescue/relief medications? (Select all that apply) Excedrin    How helpful is your rescue/relief medication?  The relief is inconsistent    Are you taking any medications to prevent migraines? (Select all that apply)  No    In the past 4 weeks, how often have you gone to urgent care or the emergency room because of your headaches?  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      Objective    /70 (BP Location: Left arm, Patient Position: Chair, Cuff Size: Adult Large)   Pulse 92   Temp 97.1  F (36.2  C) (Tympanic)   Resp 20   Wt 91.6 kg (202 lb)   LMP 11/08/2020   SpO2 98%   BMI 33.61 kg/m    Body mass index is 33.61 kg/m .  Physical Exam   GENERAL: healthy, alert and no distress  NECK: no adenopathy, no asymmetry, masses, or scars and thyroid normal to palpation  RESP: lungs clear to auscultation - no rales, rhonchi or wheezes  CV: regular rate and rhythm, normal S1 S2, no S3 or S4, no murmur, click or rub, no peripheral edema and peripheral pulses  "strong  ABDOMEN: soft, nontender, no hepatosplenomegaly, no masses and bowel sounds normal  MS: no gross musculoskeletal defects noted, no edema  PSYCH: mentation appears normal, affect normal/bright    No results found for any visits on 11/20/20.        Assessment & Plan     Papilledema of both eyes  Work on weight loss, keep ophtho and neurology  - SUMAtriptan (IMITREX) 25 MG tablet; Take 1-2 tablets (25-50 mg) by mouth at onset of headache for migraine May repeat in 2 hours. Max 8 tablets/24 hours.    Intractable episodic tension-type headache  Will try imitrex for the days it is unbearable  - SUMAtriptan (IMITREX) 25 MG tablet; Take 1-2 tablets (25-50 mg) by mouth at onset of headache for migraine May repeat in 2 hours. Max 8 tablets/24 hours.    Obesity (BMI 35.0-39.9) with comorbidity (H)  Discussed code red   25 min spent with patient greater than 50% education and counseling regarding weight loss ans answering questions       BMI:   Estimated body mass index is 33.61 kg/m  as calculated from the following:    Height as of 8/14/20: 1.651 m (5' 5\").    Weight as of this encounter: 91.6 kg (202 lb).   Weight management plan: Specific weight management program called code red discussed       Patient was agreeable to this plan and had no further questions.  Patient Instructions   Outer mindy gourmet  Lose it jessica -- black Friday special?      No follow-ups on file.    Jovana Lambert MD  [facility] [place]    "

## 2020-11-20 ENCOUNTER — OFFICE VISIT (OUTPATIENT)
Dept: FAMILY MEDICINE | Facility: OTHER | Age: 30
End: 2020-11-20
Attending: FAMILY MEDICINE
Payer: COMMERCIAL

## 2020-11-20 VITALS
HEART RATE: 92 BPM | SYSTOLIC BLOOD PRESSURE: 110 MMHG | OXYGEN SATURATION: 98 % | TEMPERATURE: 97.1 F | DIASTOLIC BLOOD PRESSURE: 70 MMHG | WEIGHT: 202 LBS | BODY MASS INDEX: 33.61 KG/M2 | RESPIRATION RATE: 20 BRPM

## 2020-11-20 DIAGNOSIS — H47.10 PAPILLEDEMA OF BOTH EYES: Primary | ICD-10-CM

## 2020-11-20 DIAGNOSIS — E66.01 MORBID OBESITY (H): ICD-10-CM

## 2020-11-20 DIAGNOSIS — G44.211 INTRACTABLE EPISODIC TENSION-TYPE HEADACHE: ICD-10-CM

## 2020-11-20 PROCEDURE — 99214 OFFICE O/P EST MOD 30 MIN: CPT | Performed by: FAMILY MEDICINE

## 2020-11-20 RX ORDER — SUMATRIPTAN 25 MG/1
25-50 TABLET, FILM COATED ORAL
Qty: 20 TABLET | Refills: 1 | Status: SHIPPED | OUTPATIENT
Start: 2020-11-20 | End: 2022-07-14

## 2020-11-20 ASSESSMENT — PAIN SCALES - GENERAL: PAINLEVEL: MILD PAIN (2)

## 2020-11-20 NOTE — NURSING NOTE
"Chief Complaint   Patient presents with     Headache       Initial /70 (BP Location: Left arm, Patient Position: Chair, Cuff Size: Adult Large)   Pulse 92   Temp 97.1  F (36.2  C) (Tympanic)   Resp 20   Wt 91.6 kg (202 lb)   LMP 11/08/2020   SpO2 98%   BMI 33.61 kg/m   Estimated body mass index is 33.61 kg/m  as calculated from the following:    Height as of 8/14/20: 1.651 m (5' 5\").    Weight as of this encounter: 91.6 kg (202 lb).  Medication Reconciliation: complete  Daksha Acevedo LPN    "

## 2020-12-01 ENCOUNTER — MYC MEDICAL ADVICE (OUTPATIENT)
Dept: FAMILY MEDICINE | Facility: OTHER | Age: 30
End: 2020-12-01

## 2020-12-01 DIAGNOSIS — H47.10 PAPILLEDEMA OF BOTH EYES: Primary | ICD-10-CM

## 2020-12-03 ENCOUNTER — TELEPHONE (OUTPATIENT)
Dept: FAMILY MEDICINE | Facility: OTHER | Age: 30
End: 2020-12-03

## 2020-12-04 DIAGNOSIS — F43.21 ADJUSTMENT DISORDER WITH DEPRESSED MOOD: ICD-10-CM

## 2020-12-04 NOTE — TELEPHONE ENCOUNTER
Zoloft 50mg      Last Written Prescription Date:  2/14/20  Last Fill Quantity: 90,   # refills: 2  Last Office Visit: 11/20/20  Future Office visit:       Routing refill request to provider for review/approval because:

## 2020-12-21 DIAGNOSIS — E06.3 HYPOTHYROIDISM DUE TO HASHIMOTO'S THYROIDITIS: ICD-10-CM

## 2020-12-21 RX ORDER — THYROID 30 MG/1
TABLET ORAL
Qty: 90 TABLET | Refills: 1 | Status: SHIPPED | OUTPATIENT
Start: 2020-12-21 | End: 2021-06-08

## 2020-12-21 RX ORDER — THYROID 60 MG/1
TABLET ORAL
Qty: 90 TABLET | Refills: 1 | Status: SHIPPED | OUTPATIENT
Start: 2020-12-21 | End: 2021-06-08

## 2020-12-21 NOTE — TELEPHONE ENCOUNTER
Jud thyroid 30      Last Written Prescription Date:  11-11-20  Last Fill Quantity: 90,   # refills: 0  Last Office Visit: 11-20-20  Future Office visit:       Jud thyroid 60       Last Written Prescription Date:  11-11-20  Last Fill Quantity: 90,   # refills: 0  Last Office Visit: 11-20-20  Future Office visit:

## 2020-12-30 DIAGNOSIS — E06.3 HYPOTHYROIDISM DUE TO HASHIMOTO'S THYROIDITIS: ICD-10-CM

## 2020-12-30 DIAGNOSIS — E28.2 PCOS (POLYCYSTIC OVARIAN SYNDROME): ICD-10-CM

## 2020-12-30 DIAGNOSIS — L70.8 OTHER ACNE: ICD-10-CM

## 2020-12-30 RX ORDER — SPIRONOLACTONE 50 MG/1
TABLET, FILM COATED ORAL
Qty: 120 TABLET | Refills: 1 | Status: SHIPPED | OUTPATIENT
Start: 2020-12-30 | End: 2021-03-09

## 2020-12-30 RX ORDER — LIOTHYRONINE SODIUM 5 UG/1
TABLET ORAL
Qty: 30 TABLET | Refills: 1 | Status: SHIPPED | OUTPATIENT
Start: 2020-12-30 | End: 2021-03-09

## 2020-12-30 NOTE — TELEPHONE ENCOUNTER
Liothyronine 5 MCG      Last Written Prescription Date:  11-  Last Fill Quantity: 30,   # refills: 1  Last Office Visit: 11-    Spironolactone 50 mg      Last Written Prescription Date:  10-  Last Fill Quantity: 120,   # refills: 1  Last Office Visit: 11-

## 2021-01-28 ENCOUNTER — MYC MEDICAL ADVICE (OUTPATIENT)
Dept: FAMILY MEDICINE | Facility: OTHER | Age: 31
End: 2021-01-28

## 2021-01-28 DIAGNOSIS — H47.10 PAPILLEDEMA OF BOTH EYES: Primary | ICD-10-CM

## 2021-03-09 DIAGNOSIS — L70.8 OTHER ACNE: ICD-10-CM

## 2021-03-09 DIAGNOSIS — E06.3 HYPOTHYROIDISM DUE TO HASHIMOTO'S THYROIDITIS: ICD-10-CM

## 2021-03-09 DIAGNOSIS — E28.2 PCOS (POLYCYSTIC OVARIAN SYNDROME): ICD-10-CM

## 2021-03-09 RX ORDER — SPIRONOLACTONE 50 MG/1
TABLET, FILM COATED ORAL
Qty: 120 TABLET | Refills: 1 | Status: SHIPPED | OUTPATIENT
Start: 2021-03-09 | End: 2021-06-08

## 2021-03-09 RX ORDER — LIOTHYRONINE SODIUM 5 UG/1
TABLET ORAL
Qty: 30 TABLET | Refills: 1 | Status: SHIPPED | OUTPATIENT
Start: 2021-03-09 | End: 2021-05-28

## 2021-03-09 NOTE — TELEPHONE ENCOUNTER
Cytomel 5 MGC      Last Written Prescription Date:  12-  Last Fill Quantity: 30,   # refills: 1    Spironolactone 50 mg      Last Written Prescription Date:  12-  Last Fill Quantity: 120,   # refills: 1  Last Office Visit: 11-

## 2021-06-08 DIAGNOSIS — L70.8 OTHER ACNE: ICD-10-CM

## 2021-06-08 DIAGNOSIS — E06.3 HYPOTHYROIDISM DUE TO HASHIMOTO'S THYROIDITIS: ICD-10-CM

## 2021-06-08 DIAGNOSIS — E28.2 PCOS (POLYCYSTIC OVARIAN SYNDROME): ICD-10-CM

## 2021-06-08 RX ORDER — THYROID 30 MG/1
TABLET ORAL
Qty: 90 TABLET | Refills: 1 | Status: SHIPPED | OUTPATIENT
Start: 2021-06-08 | End: 2021-11-24

## 2021-06-08 RX ORDER — METFORMIN HCL 500 MG
TABLET, EXTENDED RELEASE 24 HR ORAL
Qty: 180 TABLET | Refills: 3 | Status: SHIPPED | OUTPATIENT
Start: 2021-06-08 | End: 2022-03-02

## 2021-06-08 RX ORDER — SPIRONOLACTONE 50 MG/1
TABLET, FILM COATED ORAL
Qty: 120 TABLET | Refills: 1 | Status: SHIPPED | OUTPATIENT
Start: 2021-06-08 | End: 2021-09-08

## 2021-06-08 RX ORDER — THYROID 60 MG/1
TABLET ORAL
Qty: 90 TABLET | Refills: 1 | Status: SHIPPED | OUTPATIENT
Start: 2021-06-08 | End: 2021-11-24

## 2021-06-08 NOTE — TELEPHONE ENCOUNTER
Aldactone       Last Written Prescription Date:  3/9/2021  Last Fill Quantity: 120,   # refills: 1    Toney Thyroid       Last Written Prescription Date:  12/21/2020  Last Fill Quantity: 90,   # refills: 1    Thyroid       Last Written Prescription Date:  12/21/2020  Last Fill Quantity: 90,   # refills: 1    Metformin       Last Written Prescription Date:  7/2/2020  Last Fill Quantity: 180,   # refills: 3  Last Office Visit: 11/20/2020  Future Office visit:

## 2021-09-02 NOTE — PROGRESS NOTES
SUBJECTIVE:   CC: Meredith Gordon is an 31 year old woman who presents for preventive health visit.     {  Healthy Habits:     Getting at least 3 servings of Calcium per day:  NO    Bi-annual eye exam:  Yes    Dental care twice a year:  Yes    Sleep apnea or symptoms of sleep apnea:  None    Diet:  Carbohydrate counting    Frequency of exercise:  2-3 days/week    Duration of exercise:  30-45 minutes    Taking medications regularly:  Yes    Medication side effects:  None    PHQ-2 Total Score: 2    Additional concerns today:  Yes          Depression and Anxiety Follow-Up    How are you doing with your depression since your last visit? Worsened little bit     How are you doing with your anxiety since your last visit?  Improved     Are you having other symptoms that might be associated with depression or anxiety? Yes:  see phq, kelli    Have you had a significant life event? OTHER: had covid     Do you have any concerns with your use of alcohol or other drugs? No    Social History     Tobacco Use     Smoking status: Never Smoker     Smokeless tobacco: Never Used   Vaping Use     Vaping Use: Never used   Substance Use Topics     Alcohol use: Yes     Alcohol/week: 0.0 standard drinks     Comment: Occasional     Drug use: No     PHQ 11/27/2019 2/14/2020 9/3/2021   PHQ-9 Total Score 8 3 7   Q9: Thoughts of better off dead/self-harm past 2 weeks Not at all Not at all Not at all     KELLI-7 SCORE 11/27/2019 2/14/2020 9/3/2021   Total Score 1 0 0     Last PHQ-9 9/3/2021   1.  Little interest or pleasure in doing things 1   2.  Feeling down, depressed, or hopeless 1   3.  Trouble falling or staying asleep, or sleeping too much 1   4.  Feeling tired or having little energy 2   5.  Poor appetite or overeating 1   6.  Feeling bad about yourself 0   7.  Trouble concentrating 1   8.  Moving slowly or restless 0   Q9: Thoughts of better off dead/self-harm past 2 weeks 0   PHQ-9 Total Score 7   Difficulty at work, home, or with people  Somewhat difficult     ROBBIE-7  9/3/2021   1. Feeling nervous, anxious, or on edge 0   2. Not being able to stop or control worrying 0   3. Worrying too much about different things 0   4. Trouble relaxing 0   5. Being so restless that it is hard to sit still 0   6. Becoming easily annoyed or irritable 0   7. Feeling afraid, as if something awful might happen 0   ROBBIE-7 Total Score 0   If you checked any problems, how difficult have they made it for you to do your work, take care of things at home, or get along with other people? -       Suicide Assessment Five-step Evaluation and Treatment (SAFE-T)      Today's PHQ-2 Score:   PHQ-2 ( 1999 Pf[employer]) 8/28/2021   Q1: Little interest or pleasure in doing things 1   Q2: Feeling down, depressed or hopeless 1   PHQ-2 Score 2   Q1: Little interest or pleasure in doing things Several days   Q2: Feeling down, depressed or hopeless Several days   PHQ-2 Score 2     Hypothyroidism Follow-up      Since last visit, patient describes the following symptoms: depression, fatigue and hair loss      Abuse: Current or Past (Physical, Sexual or Emotional) - No  Do you feel safe in your environment? Yes    Have you ever done Advance Care Planning? (For example, a Health Directive, POLST, or a discussion with a medical provider or your loved ones about your wishes):     Social History     Tobacco Use     Smoking status: Never Smoker     Smokeless tobacco: Never Used   Substance Use Topics     Alcohol use: Yes     Alcohol/week: 0.0 standard drinks     Comment: Occasional       Alcohol Use 8/28/2021   Prescreen: >3 drinks/day or >7 drinks/week? No   Prescreen: >3 drinks/day or >7 drinks/week? -     Reviewed orders with patient.  Reviewed health maintenance and updated orders accordingly - Yes  Lab work is in process  Labs reviewed in EPIC  BP Readings from Last 3 Encounters:   09/03/21 100/60   11/20/20 110/70   08/14/20 110/68    Wt Readings from Last 3 Encounters:   09/03/21 84.4 kg (186 lb)    11/20/20 91.6 kg (202 lb)   08/14/20 90.7 kg (200 lb)                  Patient Active Problem List   Diagnosis     PCOS (polycystic ovarian syndrome)     ACP (advance care planning)     Hypothyroidism due to Hashimoto's thyroiditis     Hyperlipidemia LDL goal <130     Iron deficiency     Rosacea     Enlarged lymph nodes     Sebaceous cyst     Weight gain     Adjustment disorder with depressed mood     Obesity (BMI 35.0-39.9) with comorbidity (H)     Other acne     Papilledema of both eyes     Spinal puncture headache     Vitamin B12 deficiency (non anemic)     Past Surgical History:   Procedure Laterality Date     MAMMOPLASTY REDUCTION  2015     wisdom teeth         Social History     Tobacco Use     Smoking status: Never Smoker     Smokeless tobacco: Never Used   Substance Use Topics     Alcohol use: Yes     Alcohol/week: 0.0 standard drinks     Comment: Occasional     Family History   Problem Relation Age of Onset     Thyroid Disease Mother         hypothyroidism     Nephrolithiasis Father      Other - See Comments Sister         pcos     Family History Negative Sister      Family History Negative Sister      Family History Negative Brother      Depression Brother      Thyroid Disease Brother      Family History Negative Brother      Hypothyroidism Maternal Grandmother      Thyroid Disease Maternal Grandmother      Cerebrovascular Disease Maternal Grandmother 76     Rheumatoid Arthritis Maternal Grandfather      Diabetes Paternal Grandfather         type 2     Hypertension Paternal Grandfather          Current Outpatient Medications   Medication Sig Dispense Refill     acetaZOLAMIDE (DIAMOX) 250 MG tablet Take 2 Tabs by mouth two times a day. (Patient taking differently: Taking 3 tablets) 360 tablet 0     ARMOUR THYROID 30 MG tablet Take 1 Tab by mouth one time a day. 90 tablet 1     liothyronine (CYTOMEL) 5 MCG tablet Take 1 Tab by mouth one time a day. 30 tablet 2     metFORMIN (GLUCOPHAGE-XR) 500 MG 24 hr  tablet Take 2 Tabs by mouth at bedtime. 180 tablet 3     sertraline (ZOLOFT) 50 MG tablet Take 1 Tab by mouth one time a day. 90 tablet 2     spironolactone (ALDACTONE) 50 MG tablet Take 1 Tab by mouth two times a day. 120 tablet 1     SUMAtriptan (IMITREX) 25 MG tablet Take 1-2 tablets (25-50 mg) by mouth at onset of headache for migraine May repeat in 2 hours. Max 8 tablets/24 hours. 20 tablet 1     thyroid (ARMOUR THYROID) 60 MG tablet Take 1 Tab by mouth one time a day - take in addition to the 30mg daily 90 tablet 1     triamcinolone (KENALOG) 0.1 % external cream Apply topically 2 times daily 45 g 1     vitamin D3 (CHOLECALCIFEROL) 2000 units (50 mcg) tablet Take 2 tablets (4,000 Units) by mouth daily 90 tablet 1     Allergies   Allergen Reactions     Cats Itching     Cat/feline product derivatives       Breast Cancer Screening:    Breast CA Risk Assessment (FHS-7) 2021   Do you have a family history of breast, colon, or ovarian cancer? No / Unknown       Pertinent mammograms are reviewed under the imaging tab.    History of abnormal Pap smear: NO - age 30-65 PAP every 5 years with negative HPV co-testing recommended  PAP / HPV 2016   PAP (Historical) NIL     Reviewed and updated as needed this visit by clinical staff  Tobacco  Allergies  Meds   Med Hx  Surg Hx  Fam Hx  Soc Hx        Reviewed and updated as needed this visit by Provider                Past Medical History:   Diagnosis Date      novel coronavirus disease (COVID-19) 2021     B12 deficiency 2012    resolved     Hashimoto's disease 2011     [facility]phrolithiasis      in [facility] ([facility])     PCOS (polycystic ovarian syndrome)      Recurrent major depressive disorder, in partial remission (H)       Past Surgical History:   Procedure Laterality Date     MAMMOPLASTY REDUCTION  2015     wisdom teeth       OB History    Para Term  AB Living   0 0 0 0 0 0   SAB TAB Ectopic Multiple Live Births   0  "0 0 0 0       Review of Systems  CONSTITUTIONAL: NEGATIVE for fever, chills, change in weight  INTEGUMENTARU/SKIN: NEGATIVE for worrisome rashes, moles or lesions  EYES: NEGATIVE for vision changes or irritation  ENT: NEGATIVE for ear, mouth and throat problems  RESP: NEGATIVE for significant cough or SOB  BREAST: NEGATIVE for masses, tenderness or discharge  CV: NEGATIVE for chest pain, palpitations or peripheral edema  GI: NEGATIVE for nausea, abdominal pain, heartburn, or change in bowel habits  : NEGATIVE for unusual urinary or vaginal symptoms. Periods are regular.  MUSCULOSKELETAL: NEGATIVE for significant arthralgias or myalgia  NEURO: NEGATIVE for weakness, dizziness or paresthesias  ENDOCRINE: POSITIVE  for menstrual irregularity    PSYCHIATRIC: NEGATIVE for changes in mood or affect     OBJECTIVE:   /60 (BP Location: Left arm, Patient Position: Chair, Cuff Size: Adult Large)   Pulse 70   Temp 97.6  F (36.4  C) (Tympanic)   Resp 16   Ht 1.638 m (5' 4.5\")   Wt 84.4 kg (186 lb)   LMP 09/01/2021   SpO2 99%   BMI 31.43 kg/m    Physical Exam  GENERAL: healthy, alert and no distress  EYES: Eyes grossly normal to inspection, PERRL and conjunctivae and sclerae normal  HENT: ear canals and TM's normal, nose and mouth without ulcers or lesions  NECK: no adenopathy, no asymmetry, masses, or scars and thyroid normal to palpation  RESP: lungs clear to auscultation - no rales, rhonchi or wheezes  BREAST: normal without masses, tenderness or nipple discharge and no palpable axillary masses or adenopathy, surgical scars bilaterally from reduction  CV: regular rate and rhythm, normal S1 S2, no S3 or S4, no murmur, click or rub, no peripheral edema and peripheral pulses strong  ABDOMEN: soft, nontender, no hepatosplenomegaly, no masses and bowel sounds normal   (female): deferred  MS: no gross musculoskeletal defects noted, no edema  SKIN: no suspicious lesions or rashes  NEURO: Normal strength and tone, " "mentation intact and speech normal  PSYCH: mentation appears normal, affect normal/bright    Diagnostic Test Results:  Labs reviewed in Epic  Results for orders placed or performed in visit on 09/03/21   Comprehensive metabolic panel     Status: Abnormal (Preliminary result)   Result Value Ref Range    Sodium 141 133 - 144 mmol/L    Potassium 3.2 (L) 3.4 - 5.3 mmol/L    Chloride 115 (H) 94 - 109 mmol/L    Carbon Dioxide (CO2)      Anion Gap      Urea Nitrogen      Creatinine      Calcium      Glucose      Alkaline Phosphatase      AST      ALT      Protein Total      Albumin      Bilirubin Total      GFR Estimate         ASSESSMENT/PLAN:   (Z00.00) Routine general medical examination at a health care facility  (primary encounter diagnosis)  Comment:   Plan: follow-up 1 year    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 thyroiditis  Comment:   Plan: TSH with free T4 reflex          (E61.1) Iron deficiency  Comment:   Plan: Iron and iron binding capacity, Ferritin          (E78.5) Hyperlipidemia LDL goal <130  Comment:   Plan: Comprehensive metabolic panel, Lipid Profile         (Chol, Trig, HDL, LDL calc)          (E53.8) Vitamin B12 deficiency (non anemic)  Comment:   Plan: Vitamin B12          Dysmenorrhea with clots and brown bleeding  Start vitamins and pending lab results follow-up 2-3 mos to further discuss    COUNSELING:  Reviewed preventive health counseling, as reflected in patient instructions  Special attention given to:        Regular exercise       Healthy diet/nutrition       Vision screening       Hearing screening    Estimated body mass index is 31.43 kg/m  as calculated from the following:    Height as of this encounter: 1.638 m (5' 4.5\").    Weight as of this encounter: 84.4 kg (186 lb).    Weight management plan: Discussed healthy diet and exercise guidelines    She reports that she has never smoked. She has never used smokeless tobacco.      Counseling Resources:  ATP IV Guidelines  Pooled " Cohorts Equation Calculator  Breast Cancer Risk Calculator  BRCA-Related Cancer Risk Assessment: FHS-7 Tool  FRAX Risk Assessment  ICSI Preventive Guidelines  Dietary Guidelines for Americans, 2010  USDA's MyPlate  ASA Prophylaxis  Lung CA Screening    Jovana Lambert MD  [facility] [place]

## 2021-09-02 NOTE — PATIENT INSTRUCTIONS
optivite pmt -- 3 tabs 2x/day      Preventive Health Recommendations  Female Ages 26 - 39  Yearly exam:   See your health care provider every year in order to    Review health changes.     Discuss preventive care.      Review your medicines if you your doctor has prescribed any.    Until age 30: Get a Pap test every three years (more often if you have had an abnormal result).    After age 30: Talk to your doctor about whether you should have a Pap test every 3 years or have a Pap test with HPV screening every 5 years.   You do not need a Pap test if your uterus was removed (hysterectomy) and you have not had cancer.  You should be tested each year for STDs (sexually transmitted diseases), if you're at risk.   Talk to your provider about how often to have your cholesterol checked.  If you are at risk for diabetes, you should have a diabetes test (fasting glucose).  Shots: Get a flu shot each year. Get a tetanus shot every 10 years.   Nutrition:     Eat at least 5 servings of fruits and vegetables each day.    Eat whole-grain bread, whole-wheat pasta and brown rice instead of white grains and rice.    Get adequate Calcium and Vitamin D.     Lifestyle    Exercise at least 150 minutes a week (30 minutes a day, 5 days of the week). This will help you control your weight and prevent disease.    Limit alcohol to one drink per day.    No smoking.     Wear sunscreen to prevent skin cancer.    See your dentist every six months for an exam and cleaning.

## 2021-09-03 ENCOUNTER — OFFICE VISIT (OUTPATIENT)
Dept: FAMILY MEDICINE | Facility: OTHER | Age: 31
End: 2021-09-03
Attending: FAMILY MEDICINE
Payer: COMMERCIAL

## 2021-09-03 VITALS
WEIGHT: 186 LBS | HEART RATE: 70 BPM | OXYGEN SATURATION: 99 % | DIASTOLIC BLOOD PRESSURE: 60 MMHG | SYSTOLIC BLOOD PRESSURE: 100 MMHG | HEIGHT: 65 IN | RESPIRATION RATE: 16 BRPM | TEMPERATURE: 97.6 F | BODY MASS INDEX: 30.99 KG/M2

## 2021-09-03 DIAGNOSIS — Z00.00 ROUTINE GENERAL MEDICAL EXAMINATION AT A HEALTH CARE FACILITY: Primary | ICD-10-CM

## 2021-09-03 DIAGNOSIS — E78.5 HYPERLIPIDEMIA LDL GOAL <130: ICD-10-CM

## 2021-09-03 DIAGNOSIS — E53.8 VITAMIN B12 DEFICIENCY (NON ANEMIC): ICD-10-CM

## 2021-09-03 DIAGNOSIS — F43.21 ADJUSTMENT DISORDER WITH DEPRESSED MOOD: ICD-10-CM

## 2021-09-03 DIAGNOSIS — E06.3 HYPOTHYROIDISM DUE TO HASHIMOTO'S THYROIDITIS: ICD-10-CM

## 2021-09-03 DIAGNOSIS — E61.1 IRON DEFICIENCY: ICD-10-CM

## 2021-09-03 LAB
ALBUMIN SERPL-MCNC: 4 G/DL (ref 3.4–5)
ALP SERPL-CCNC: 71 U/L (ref 40–150)
ALT SERPL W P-5'-P-CCNC: 29 U/L (ref 0–50)
ANION GAP SERPL CALCULATED.3IONS-SCNC: 6 MMOL/L (ref 3–14)
AST SERPL W P-5'-P-CCNC: 12 U/L (ref 0–45)
BILIRUB SERPL-MCNC: 0.3 MG/DL (ref 0.2–1.3)
BUN SERPL-MCNC: 12 MG/DL (ref 7–30)
CALCIUM SERPL-MCNC: 8.4 MG/DL (ref 8.5–10.1)
CHLORIDE BLD-SCNC: 115 MMOL/L (ref 94–109)
CHOLEST SERPL-MCNC: 217 MG/DL
CO2 SERPL-SCNC: 20 MMOL/L (ref 20–32)
CREAT SERPL-MCNC: 0.85 MG/DL (ref 0.52–1.04)
FASTING STATUS PATIENT QL REPORTED: NO
FERRITIN SERPL-MCNC: 24 NG/ML (ref 12–150)
GFR SERPL CREATININE-BSD FRML MDRD: >90 ML/MIN/1.73M2
GLUCOSE BLD-MCNC: 87 MG/DL (ref 70–99)
HDLC SERPL-MCNC: 53 MG/DL
IRON SATN MFR SERPL: 24 % (ref 15–46)
IRON SERPL-MCNC: 81 UG/DL (ref 35–180)
LDLC SERPL CALC-MCNC: 129 MG/DL
NONHDLC SERPL-MCNC: 164 MG/DL
POTASSIUM BLD-SCNC: 3.2 MMOL/L (ref 3.4–5.3)
PROT SERPL-MCNC: 7.8 G/DL (ref 6.8–8.8)
SODIUM SERPL-SCNC: 141 MMOL/L (ref 133–144)
TIBC SERPL-MCNC: 337 UG/DL (ref 240–430)
TRIGL SERPL-MCNC: 174 MG/DL
TSH SERPL DL<=0.005 MIU/L-ACNC: 1.06 MU/L (ref 0.4–4)

## 2021-09-03 PROCEDURE — 99395 PREV VISIT EST AGE 18-39: CPT | Performed by: FAMILY MEDICINE

## 2021-09-03 PROCEDURE — 82728 ASSAY OF FERRITIN: CPT | Performed by: FAMILY MEDICINE

## 2021-09-03 PROCEDURE — 82607 VITAMIN B-12: CPT | Performed by: FAMILY MEDICINE

## 2021-09-03 PROCEDURE — 80061 LIPID PANEL: CPT | Performed by: FAMILY MEDICINE

## 2021-09-03 PROCEDURE — 36415 COLL VENOUS BLD VENIPUNCTURE: CPT | Performed by: FAMILY MEDICINE

## 2021-09-03 PROCEDURE — 83550 IRON BINDING TEST: CPT | Performed by: FAMILY MEDICINE

## 2021-09-03 PROCEDURE — 84443 ASSAY THYROID STIM HORMONE: CPT | Performed by: FAMILY MEDICINE

## 2021-09-03 PROCEDURE — 80053 COMPREHEN METABOLIC PANEL: CPT | Performed by: FAMILY MEDICINE

## 2021-09-03 SDOH — HEALTH STABILITY: PHYSICAL HEALTH: ON AVERAGE, HOW MANY MINUTES DO YOU ENGAGE IN EXERCISE AT THIS LEVEL?: 0 MIN

## 2021-09-03 SDOH — HEALTH STABILITY: PHYSICAL HEALTH: ON AVERAGE, HOW MANY DAYS PER WEEK DO YOU ENGAGE IN MODERATE TO STRENUOUS EXERCISE (LIKE A BRISK WALK)?: 0 DAYS

## 2021-09-03 ASSESSMENT — SOCIAL DETERMINANTS OF HEALTH (SDOH)
WITHIN THE LAST YEAR, HAVE YOU BEEN AFRAID OF YOUR PARTNER OR EX-PARTNER?: NO
WITHIN THE LAST YEAR, HAVE TO BEEN RAPED OR FORCED TO HAVE ANY KIND OF SEXUAL ACTIVITY BY YOUR PARTNER OR EX-PARTNER?: NO
WITHIN THE LAST YEAR, HAVE YOU BEEN HUMILIATED OR EMOTIONALLY ABUSED IN OTHER WAYS BY YOUR PARTNER OR EX-PARTNER?: NO
WITHIN THE LAST YEAR, HAVE YOU BEEN KICKED, HIT, SLAPPED, OR OTHERWISE PHYSICALLY HURT BY YOUR PARTNER OR EX-PARTNER?: NO
WITHIN THE LAST YEAR, HAVE YOU BEEN KICKED, HIT, SLAPPED, OR OTHERWISE PHYSICALLY HURT BY YOUR PARTNER OR EX-PARTNER?: NO
WITHIN THE LAST YEAR, HAVE YOU BEEN AFRAID OF YOUR PARTNER OR EX-PARTNER?: NO
WITHIN THE LAST YEAR, HAVE YOU BEEN HUMILIATED OR EMOTIONALLY ABUSED IN OTHER WAYS BY YOUR PARTNER OR EX-PARTNER?: NO
WITHIN THE LAST YEAR, HAVE TO BEEN RAPED OR FORCED TO HAVE ANY KIND OF SEXUAL ACTIVITY BY YOUR PARTNER OR EX-PARTNER?: NO

## 2021-09-03 ASSESSMENT — ANXIETY QUESTIONNAIRES
7. FEELING AFRAID AS IF SOMETHING AWFUL MIGHT HAPPEN: NOT AT ALL
4. TROUBLE RELAXING: NOT AT ALL
6. BECOMING EASILY ANNOYED OR IRRITABLE: NOT AT ALL
5. BEING SO RESTLESS THAT IT IS HARD TO SIT STILL: NOT AT ALL
3. WORRYING TOO MUCH ABOUT DIFFERENT THINGS: NOT AT ALL
1. FEELING NERVOUS, ANXIOUS, OR ON EDGE: NOT AT ALL
GAD7 TOTAL SCORE: 0
2. NOT BEING ABLE TO STOP OR CONTROL WORRYING: NOT AT ALL

## 2021-09-03 ASSESSMENT — PAIN SCALES - GENERAL: PAINLEVEL: NO PAIN (0)

## 2021-09-03 ASSESSMENT — LIFESTYLE VARIABLES
HOW OFTEN DO YOU HAVE A DRINK CONTAINING ALCOHOL: 2-3 TIMES A WEEK
HOW MANY STANDARD DRINKS CONTAINING ALCOHOL DO YOU HAVE ON A TYPICAL DAY: 1 OR 2

## 2021-09-03 ASSESSMENT — PATIENT HEALTH QUESTIONNAIRE - PHQ9: SUM OF ALL RESPONSES TO PHQ QUESTIONS 1-9: 7

## 2021-09-03 ASSESSMENT — MIFFLIN-ST. JEOR: SCORE: 1556.63

## 2021-09-03 NOTE — NURSING NOTE
"Chief Complaint   Patient presents with     Physical       Initial /60 (BP Location: Left arm, Patient Position: Chair, Cuff Size: Adult Large)   Pulse 70   Temp 97.6  F (36.4  C) (Tympanic)   Resp 16   Ht 1.638 m (5' 4.5\")   Wt 84.4 kg (186 lb)   LMP 09/01/2021   SpO2 99%   BMI 31.43 kg/m   Estimated body mass index is 31.43 kg/m  as calculated from the following:    Height as of this encounter: 1.638 m (5' 4.5\").    Weight as of this encounter: 84.4 kg (186 lb).  Medication Reconciliation: complete  Daksha Acevedo LPN    "

## 2021-09-04 LAB — VIT B12 SERPL-MCNC: 328 PG/ML (ref 193–986)

## 2021-09-04 ASSESSMENT — ANXIETY QUESTIONNAIRES: GAD7 TOTAL SCORE: 0

## 2021-09-07 DIAGNOSIS — L70.8 OTHER ACNE: ICD-10-CM

## 2021-09-07 DIAGNOSIS — E28.2 PCOS (POLYCYSTIC OVARIAN SYNDROME): ICD-10-CM

## 2021-09-08 RX ORDER — SPIRONOLACTONE 50 MG/1
TABLET, FILM COATED ORAL
Qty: 120 TABLET | Refills: 1 | Status: SHIPPED | OUTPATIENT
Start: 2021-09-08 | End: 2021-11-19

## 2021-09-08 NOTE — TELEPHONE ENCOUNTER
spironolactone      Last Written Prescription Date:  6/8/21  Last Fill Quantity: 120 ,   # refills: 1  Last Office Visit: 9/3/21  Future Office visit:    Next 5 appointments (look out 90 days)    Nov 19, 2021  3:00 PM  (Arrive by 2:45 PM)  SHORT with Jovana Lambert MD  [facility] [place] ([facility] [place] ) [address]  [place] [address]  [phone]

## 2021-10-15 DIAGNOSIS — E06.3 HYPOTHYROIDISM DUE TO HASHIMOTO'S THYROIDITIS: ICD-10-CM

## 2021-10-18 RX ORDER — LIOTHYRONINE SODIUM 5 UG/1
TABLET ORAL
Qty: 30 TABLET | Refills: 0 | Status: SHIPPED | OUTPATIENT
Start: 2021-10-18 | End: 2021-11-24

## 2021-10-18 NOTE — TELEPHONE ENCOUNTER
Cytomel      Last Written Prescription Date:  9/13/21  Last Fill Quantity: 30,   # refills: 0  Last Office Visit: 9/3/21  Future Office visit:    Next 5 appointments (look out 90 days)    Nov 19, 2021  3:00 PM  (Arrive by 2:45 PM)  SHORT with Jovana Lambert MD  [facility] [place] ([facility] [place] ) [address]  [place] [address]  [phone]           Routing refill request to provider for review/approval because:

## 2021-10-25 ENCOUNTER — MYC MEDICAL ADVICE (OUTPATIENT)
Dept: FAMILY MEDICINE | Facility: OTHER | Age: 31
End: 2021-10-25

## 2021-10-25 DIAGNOSIS — H47.10 PAPILLEDEMA OF BOTH EYES: Primary | ICD-10-CM

## 2021-11-18 NOTE — PROGRESS NOTES
Assessment & Plan     Iron deficiency  Recheck labs as she had been taking iron daily but may not be enough  - Iron and iron binding capacity; Future  - Ferritin; Future    Vitamin B12 deficiency (non anemic)  Recheck levels to see if optivite brought them up  - Vitamin B12; Future    Metrorrhagia  With high stress, will check  - Prolactin; Future    Hypokalemia  Multiple diuretic effect, stop spironolactone  - Basic metabolic panel; Future    Papilledema of both eyes  Updated sig, she needs follow-up with  neurology  - acetaZOLAMIDE (DIAMOX) 250 MG tablet; Take 3 tablets (750 mg) by mouth 2 times daily Taking 3 tablets    Morbid obesity (H)  Computer generated diagnosis, she is working on this    Fatigue, unspecified type  Second to many things above possible  Pending labs Sunday, day 3    Insomnia, unspecified type  Start with melatonin for now until I get results    30 minutes spent on the date of the encounter doing chart review, history and exam, documentation and further activities per the note    Patient was agreeable to this plan and had no further questions.  There are no Patient Instructions on file for this visit.    No follow-ups on file.    Jovana Lambert MD  [facility] [place] Harper is a 31 year old who presents for the following health issues   HPI     Hyperlipidemia Follow-Up      Are you regularly taking any medication or supplement to lower your cholesterol?   No    Are you having muscle aches or other side effects that you think could be caused by your cholesterol lowering medication?  n/a    Hypertension Follow-up      Do you check your blood pressure regularly outside of the clinic? No     Are you following a low salt diet? No    Are your blood pressures ever more than 140 on the top number (systolic) OR more   than 90 on the bottom number (diastolic), for example 140/90? n/a    Depression Followup    How are you doing with your depression since your last  visit? Improved     Are you having other symptoms that might be associated with depression? No    Have you had a significant life event?  No     Are you feeling anxious or having panic attacks?   No    Do you have any concerns with your use of alcohol or other drugs? No    Social History     Tobacco Use     Smoking status: Never Smoker     Smokeless tobacco: Never Used   Vaping Use     Vaping Use: Never used   Substance Use Topics     Alcohol use: Yes     Alcohol/week: 0.0 standard drinks     Comment: Occasional     Drug use: No     PHQ 2/14/2020 9/3/2021 11/19/2021   PHQ-9 Total Score 3 7 7   Q9: Thoughts of better off dead/self-harm past 2 weeks Not at all Not at all Not at all     ROBBIE-7 SCORE 2/14/2020 9/3/2021 11/19/2021   Total Score 0 0 4     Last PHQ-9 11/19/2021   1.  Little interest or pleasure in doing things 0   2.  Feeling down, depressed, or hopeless 0   3.  Trouble falling or staying asleep, or sleeping too much 3   4.  Feeling tired or having little energy 3   5.  Poor appetite or overeating 1   6.  Feeling bad about yourself 0   7.  Trouble concentrating 0   8.  Moving slowly or restless 0   Q9: Thoughts of better off dead/self-harm past 2 weeks 0   PHQ-9 Total Score 7   Difficulty at work, home, or with people Not difficult at all     ROBBIE-7  11/19/2021   1. Feeling nervous, anxious, or on edge 1   2. Not being able to stop or control worrying 1   3. Worrying too much about different things 1   4. Trouble relaxing 1   5. Being so restless that it is hard to sit still 0   6. Becoming easily annoyed or irritable 0   7. Feeling afraid, as if something awful might happen 0   ROBBIE-7 Total Score 4   If you checked any problems, how difficult have they made it for you to do your work, take care of things at home, or get along with other people? Not difficult at all       Suicide Assessment Five-step Evaluation and Treatment (SAFE-T)      How many servings of fruits and vegetables do you eat daily?  0-1    On  average, how many sweetened beverages do you drink each day (Examples: soda, juice, sweet tea, etc.  Do NOT count diet or artificially sweetened beverages)?   0    How many days per week do you exercise enough to make your heart beat faster? 3 or less    How many minutes a day do you exercise enough to make your heart beat faster? 9 or less    How many days per week do you miss taking your medication? 0      Review of Systems   Constitutional, HEENT, cardiovascular, pulmonary, gi and gu systems are negative, except as otherwise noted.      Objective    /70   Pulse 70   Temp 97.6  F (36.4  C)   Resp 18   Wt 86.6 kg (191 lb)   SpO2 99%   BMI 32.28 kg/m    Body mass index is 32.28 kg/m .  Physical Exam   GENERAL: healthy, alert and no distress  NECK: no adenopathy, no asymmetry, masses, or scars and thyroid normal to palpation  RESP: lungs clear to auscultation - no rales, rhonchi or wheezes  CV: regular rate and rhythm, normal S1 S2, no S3 or S4, no murmur, click or rub, no peripheral edema and peripheral pulses strong  ABDOMEN: soft, nontender, no hepatosplenomegaly, no masses and bowel sounds normal  MS: no gross musculoskeletal defects noted, no edema  PSYCH: mentation appears normal, affect normal/bright    No results found for any visits on 11/19/21.

## 2021-11-19 ENCOUNTER — OFFICE VISIT (OUTPATIENT)
Dept: FAMILY MEDICINE | Facility: OTHER | Age: 31
End: 2021-11-19
Attending: FAMILY MEDICINE
Payer: COMMERCIAL

## 2021-11-19 VITALS
WEIGHT: 191 LBS | TEMPERATURE: 97.6 F | RESPIRATION RATE: 18 BRPM | DIASTOLIC BLOOD PRESSURE: 70 MMHG | SYSTOLIC BLOOD PRESSURE: 100 MMHG | HEART RATE: 70 BPM | BODY MASS INDEX: 32.28 KG/M2 | OXYGEN SATURATION: 99 %

## 2021-11-19 DIAGNOSIS — N92.1 METRORRHAGIA: ICD-10-CM

## 2021-11-19 DIAGNOSIS — E87.6 HYPOKALEMIA: ICD-10-CM

## 2021-11-19 DIAGNOSIS — E61.1 IRON DEFICIENCY: Primary | ICD-10-CM

## 2021-11-19 DIAGNOSIS — E53.8 VITAMIN B12 DEFICIENCY (NON ANEMIC): ICD-10-CM

## 2021-11-19 DIAGNOSIS — R53.83 FATIGUE, UNSPECIFIED TYPE: ICD-10-CM

## 2021-11-19 DIAGNOSIS — G47.00 INSOMNIA, UNSPECIFIED TYPE: ICD-10-CM

## 2021-11-19 DIAGNOSIS — H47.10 PAPILLEDEMA OF BOTH EYES: ICD-10-CM

## 2021-11-19 DIAGNOSIS — E66.01 MORBID OBESITY (H): ICD-10-CM

## 2021-11-19 PROCEDURE — 99214 OFFICE O/P EST MOD 30 MIN: CPT | Performed by: FAMILY MEDICINE

## 2021-11-19 RX ORDER — ACETAZOLAMIDE 250 MG/1
250 TABLET ORAL 2 TIMES DAILY
Qty: 360 TABLET | Refills: 0 | COMMUNITY
Start: 2021-11-19 | End: 2022-07-14

## 2021-11-19 ASSESSMENT — ANXIETY QUESTIONNAIRES
5. BEING SO RESTLESS THAT IT IS HARD TO SIT STILL: NOT AT ALL
6. BECOMING EASILY ANNOYED OR IRRITABLE: NOT AT ALL
7. FEELING AFRAID AS IF SOMETHING AWFUL MIGHT HAPPEN: NOT AT ALL
3. WORRYING TOO MUCH ABOUT DIFFERENT THINGS: SEVERAL DAYS
4. TROUBLE RELAXING: SEVERAL DAYS
GAD7 TOTAL SCORE: 4
2. NOT BEING ABLE TO STOP OR CONTROL WORRYING: SEVERAL DAYS
1. FEELING NERVOUS, ANXIOUS, OR ON EDGE: SEVERAL DAYS
IF YOU CHECKED OFF ANY PROBLEMS ON THIS QUESTIONNAIRE, HOW DIFFICULT HAVE THESE PROBLEMS MADE IT FOR YOU TO DO YOUR WORK, TAKE CARE OF THINGS AT HOME, OR GET ALONG WITH OTHER PEOPLE: NOT DIFFICULT AT ALL

## 2021-11-19 ASSESSMENT — PAIN SCALES - GENERAL: PAINLEVEL: NO PAIN (0)

## 2021-11-19 NOTE — NURSING NOTE
"Chief Complaint   Patient presents with     Lipids     Hypertension     Depression     Anxiety       Initial /70   Pulse 70   Temp 97.6  F (36.4  C)   Resp 18   Wt 86.6 kg (191 lb)   SpO2 99%   BMI 32.28 kg/m   Estimated body mass index is 32.28 kg/m  as calculated from the following:    Height as of 9/3/21: 1.638 m (5' 4.5\").    Weight as of this encounter: 86.6 kg (191 lb).  Medication Reconciliation: malathi Carvajal  "

## 2021-11-20 ASSESSMENT — ANXIETY QUESTIONNAIRES: GAD7 TOTAL SCORE: 4

## 2021-11-20 ASSESSMENT — PATIENT HEALTH QUESTIONNAIRE - PHQ9: SUM OF ALL RESPONSES TO PHQ QUESTIONS 1-9: 7

## 2021-11-21 ENCOUNTER — LAB (OUTPATIENT)
Dept: LAB | Facility: HOSPITAL | Age: 31
End: 2021-11-21
Payer: COMMERCIAL

## 2021-11-21 DIAGNOSIS — E61.1 IRON DEFICIENCY: ICD-10-CM

## 2021-11-21 DIAGNOSIS — N92.1 METRORRHAGIA: ICD-10-CM

## 2021-11-21 DIAGNOSIS — E53.8 VITAMIN B12 DEFICIENCY (NON ANEMIC): ICD-10-CM

## 2021-11-21 DIAGNOSIS — E87.6 HYPOKALEMIA: ICD-10-CM

## 2021-11-21 LAB
ANION GAP SERPL CALCULATED.3IONS-SCNC: 5 MMOL/L (ref 3–14)
BUN SERPL-MCNC: 13 MG/DL (ref 7–30)
CALCIUM SERPL-MCNC: 8.7 MG/DL (ref 8.5–10.1)
CHLORIDE BLD-SCNC: 115 MMOL/L (ref 94–109)
CO2 SERPL-SCNC: 21 MMOL/L (ref 20–32)
CREAT SERPL-MCNC: 0.9 MG/DL (ref 0.52–1.04)
FERRITIN SERPL-MCNC: 25 NG/ML (ref 12–150)
GFR SERPL CREATININE-BSD FRML MDRD: 86 ML/MIN/1.73M2
GLUCOSE BLD-MCNC: 105 MG/DL (ref 70–99)
IRON SATN MFR SERPL: 23 % (ref 15–46)
IRON SERPL-MCNC: 77 UG/DL (ref 35–180)
POTASSIUM BLD-SCNC: 3.4 MMOL/L (ref 3.4–5.3)
SODIUM SERPL-SCNC: 141 MMOL/L (ref 133–144)
TIBC SERPL-MCNC: 338 UG/DL (ref 240–430)

## 2021-11-21 PROCEDURE — 84146 ASSAY OF PROLACTIN: CPT

## 2021-11-21 PROCEDURE — 36415 COLL VENOUS BLD VENIPUNCTURE: CPT

## 2021-11-21 PROCEDURE — 82728 ASSAY OF FERRITIN: CPT

## 2021-11-21 PROCEDURE — 82607 VITAMIN B-12: CPT

## 2021-11-21 PROCEDURE — 80048 BASIC METABOLIC PNL TOTAL CA: CPT

## 2021-11-21 PROCEDURE — 83550 IRON BINDING TEST: CPT

## 2021-11-22 LAB
PROLACTIN SERPL-MCNC: 13 UG/L (ref 3–27)
VIT B12 SERPL-MCNC: 535 PG/ML (ref 193–986)

## 2021-11-23 DIAGNOSIS — E06.3 HYPOTHYROIDISM DUE TO HASHIMOTO'S THYROIDITIS: ICD-10-CM

## 2021-11-23 DIAGNOSIS — F43.21 ADJUSTMENT DISORDER WITH DEPRESSED MOOD: ICD-10-CM

## 2021-11-24 RX ORDER — LIOTHYRONINE SODIUM 5 UG/1
TABLET ORAL
Qty: 90 TABLET | Refills: 2 | Status: SHIPPED | OUTPATIENT
Start: 2021-11-24 | End: 2022-08-04

## 2021-11-24 RX ORDER — THYROID 30 MG/1
TABLET ORAL
Qty: 90 TABLET | Refills: 2 | Status: SHIPPED | OUTPATIENT
Start: 2021-11-24 | End: 2022-08-04

## 2021-11-24 RX ORDER — THYROID 60 MG/1
TABLET ORAL
Qty: 90 TABLET | Refills: 2 | Status: SHIPPED | OUTPATIENT
Start: 2021-11-24 | End: 2022-08-04

## 2021-11-24 NOTE — TELEPHONE ENCOUNTER
CYTOMEL      Last Written Prescription Date:  10-  Last Fill Quantity: 30,   # refills: 0  Last Office Visit: 11-  Future Office visit:        ZOLOFT      Last Written Prescription Date:  9-3-2021  Last Fill Quantity: 135,   # refills: 0  Last Office Visit: 11-  Future Office visit:        DYAN THYROID    30  Last Written Prescription Date:  6-8-2021  Last Fill Quantity: 90,   # refills: 1  Last Office Visit: 11-  Future Office visit:      DYAN THYROID 60      Last Written Prescription Date:  6-8-2021  Last Fill Quantity: 90,   # refills: 1  Last Office Visit: 11-  Future Office visit:    Next 5 appointments (look out 90 days)    Feb 03, 2022  3:45 PM  (Arrive by 3:30 PM)  SHORT with Jovana Lambert MD  [facility] [place] ([facility] [place] ) [address]  [place] [address]  [phone]           Routing refill request to provider for review/approval because:

## 2022-01-05 DIAGNOSIS — L70.8 OTHER ACNE: ICD-10-CM

## 2022-01-05 DIAGNOSIS — E28.2 PCOS (POLYCYSTIC OVARIAN SYNDROME): ICD-10-CM

## 2022-01-07 RX ORDER — SPIRONOLACTONE 50 MG/1
TABLET, FILM COATED ORAL
Qty: 120 TABLET | Refills: 1 | Status: SHIPPED | OUTPATIENT
Start: 2022-01-07 | End: 2022-03-02

## 2022-01-07 NOTE — TELEPHONE ENCOUNTER
spironactone      Last Written Prescription Date:  Not on medication list  Last Fill Quantity: ,   # refills:   Last Office Visit: 11/19/21  Future Office visit:    Next 5 appointments (look out 90 days)    Feb 03, 2022  3:45 PM  (Arrive by 3:30 PM)  SHORT with Jovana Lambert MD  [facility] [place] ([facility] [place] ) [address]  [place] [address]  [phone]

## 2022-02-28 NOTE — PROGRESS NOTES
Assessment/Plan:  PCOS (polycystic ovarian syndrome)  Increase metformin if thyroid labs wnl    Hypothyroidism due to Hashimoto's thyroiditis  recheck  - TSH with free T4 reflex; Future  - T3, Free; Future  - T3, total; Future  - TSH with free T4 reflex  - T3, Free  - T3, total    Somatic dysfunction of lumbar region  From shoveling snow  - Chiropractic Referral  - cyclobenzaprine (FLEXERIL) 5 MG tablet; Take 1-2 tablets (5-10 mg) by mouth 2 times daily as needed for muscle spasms    ROBBIE (generalized anxiety disorder)  Increase dose and follow-up 6-7 wks  - sertraline (ZOLOFT) 100 MG tablet; Take 1 tablet (100 mg) by mouth daily    Mild episode of recurrent major depressive disorder (H)  As above  - sertraline (ZOLOFT) 100 MG tablet; Take 1 tablet (100 mg) by mouth daily    Morbid obesity (H)  Computer generated diagnosis, she is working on weight loss          Clinic Visit  Date of visit: 2022   Chief Complaint: No chief complaint on file.      HPI:   Meredith Gordon is a 32 year old  female who presents to clinic today for follow up  for PCOS.     Menarche: age 14  Menses: frequency: every 26 days and length: 5 days. No LMP recorded. (Menstrual status: Irregular Periods).   Family Planning Chart: No.  Acne: Yes: worse around menstrual cycle.  Hirsutism (facial hair): No.  Male-pattern hair loss: No.  Elevated serum androgen: No results found for: DHEA, TESTOSTTOTAL, FT, JOVITA]  BMI: There is no height or weight on file to calculate BMI.   Type 2 DM: No.  Elevated Cholesterol: No.  Mood disorder: Yes: ROBBIE.    Ultrasound: none on file      Gynecologic History:  Last Pap:   Lab Results   Component Value Date    PAP NIL 2016      History of abnormal pap: No.    Obstetric History:   OB History    Para Term  AB Living   0 0 0 0 0 0   SAB IAB Ectopic Multiple Live Births   0 0 0 0 0     Obstetric history significant for:  NA    Medications:  acetaZOLAMIDE (DIAMOX) 250 MG tablet, Take 3  "tablets (750 mg) by mouth 2 times daily Taking 3 tablets  ARMOUR THYROID 30 MG tablet, Take 1 Tab by mouth one time a day.  liothyronine (CYTOMEL) 5 MCG tablet, Take 1 Tab by mouth one time a day.  metFORMIN (GLUCOPHAGE-XR) 500 MG 24 hr tablet, Take 2 Tabs by mouth at bedtime.  sertraline (ZOLOFT) 50 MG tablet, Take 1.5 Tablets by mouth one time a day.  spironolactone (ALDACTONE) 50 MG tablet, Take 1 Tab by mouth two times a day.  SUMAtriptan (IMITREX) 25 MG tablet, Take 1-2 tablets (25-50 mg) by mouth at onset of headache for migraine May repeat in 2 hours. Max 8 tablets/24 hours.  thyroid (ARMOUR THYROID) 60 MG tablet, Take 1 Tab by mouth one time a day - take in addition to the 30mg daily  triamcinolone (KENALOG) 0.1 % external cream, Apply topically 2 times daily  vitamin D3 (CHOLECALCIFEROL) 2000 units (50 mcg) tablet, Take 2 tablets (4,000 Units) by mouth daily    No current facility-administered medications on file prior to visit.         BMI:   Estimated body mass index is 32.01 kg/m  as calculated from the following:    Height as of 9/3/21: 1.638 m (5' 4.5\").    Weight as of this encounter: 85.9 kg (189 lb 6.4 oz).   Weight management plan: Discussed healthy diet and exercise guidelines    Patient was agreeable to this plan and had no further questions.  Patient Instructions    Psychologists/ Counselors      Chelita Oquendo   [phone]  [facility]   [phone]  [facility] [phone]  [facility] (kids) [phone]  [facility](teens) [phone]  [facility]  [phone]  [facility]  [phone]  [facility]  [phone]  [facility]  [phone]  [facility] counseling [phone]   [facility]   [phone]   [facility]    [phone]   [facility] [facility].   [phone]   (Zina Peñaloza)  [facility]                          [phone]  (Simone Bynum [facility])                                                   "    [facility]                      [phone]           [facility]  [phone]  [facility] [phone]  [facility]  [phone]  [facility] [facility]  [phone]     [facility] [phone]      [facility] [phone]  [facility] counseling [phone]  [facility]   [phone]  [facility] (Simone Bynum LMFT)                                                   [phone]  Leslie Kasia  [phone]  [facility] [phone]  [facility]      [phone]    [facility]  [phone]  [facility]      [phone]     Sil[facility] Joe   [phone]  [facility]      [phone]  [facility] Dr. SATURNINO Omalley [phone]  [facility]      [phone]  [facility]  [phone]    [facility]                      [phone]    *Facilities in bold italics indicate medication management  Services are offered.    Crisis support  Mobile crisis line- [phone]  [place] [facility]: Free online resources including therapy for Mental Health and substance use problems: Http://thriverange.org    Crisis Text Line  Text HOME to 047-006 - The Crisis Text Line serves anyone, in any type of crisis, providing access to free, 24/7 support and information via the medium people already use and trust  http://www.crisistextline.org   Here's how it works:  Text HOME to 111-857 from anywhere in the [place], anytime, about any type of crisis.  A live, trained Crisis Counselor receives the text and responds quickly.  The volunteer Crisis Counselor will help you move from a 'hot moment to a cool moment'                                No follow-ups on file.    Jovana Lambert MD  [facility] [place]      Allergy:  Allergies   Allergen Reactions     Cats Itching     Cat/feline  product derivatives       Medical History:  Past Medical History:   Diagnosis Date     2019 novel coronavirus disease (COVID-19) 08/2021     B12 deficiency 06/25/2012    resolved     Hashimoto's disease 09/02/2011     Nephrolithiasis     [facility]     PCOS (polycystic ovarian syndrome)      Recurrent major depressive disorder, in partial remission (H)        Surgical History:  Past Surgical History:   Procedure Laterality Date     MAMMOPLASTY REDUCTION  2015     wisdom teeth         Social History:  Social History    Substance and Sexual Activity      Alcohol use: Yes        Alcohol/week: 0.0 standard drinks        Comment: Occasional    History   Smoking Status     Never Smoker   Smokeless Tobacco     Never Used     History   Drug Use No     History   Sexual Activity     Sexual activity: Never     Relationship status is: Never been .        Juan David Harper is a 32 year old who presents for the following health issues         Depression and Anxiety Follow-Up    How are you doing with your depression since your last visit? No change    How are you doing with your anxiety since your last visit?  No change    Are you having other symptoms that might be associated with depression or anxiety? Yes:  more anxiety    Have you had a significant life event? Job Concerns     Do you have any concerns with your use of alcohol or other drugs? No    Social History     Tobacco Use     Smoking status: Never Smoker     Smokeless tobacco: Never Used   Vaping Use     Vaping Use: Never used   Substance Use Topics     Alcohol use: Yes     Alcohol/week: 0.0 standard drinks     Comment: Occasional     Drug use: No     PHQ 2/14/2020 9/3/2021 11/19/2021   PHQ-9 Total Score 3 7 7   Q9: Thoughts of better off dead/self-harm past 2 weeks Not at all Not at all Not at all     ROBBIE-7 SCORE 2/14/2020 9/3/2021 11/19/2021   Total Score 0 0 4     Last PHQ-9 11/19/2021   1.  Little interest or pleasure in doing  things 0   2.  Feeling down, depressed, or hopeless 0   3.  Trouble falling or staying asleep, or sleeping too much 3   4.  Feeling tired or having little energy 3   5.  Poor appetite or overeating 1   6.  Feeling bad about yourself 0   7.  Trouble concentrating 0   8.  Moving slowly or restless 0   Q9: Thoughts of better off dead/self-harm past 2 weeks 0   PHQ-9 Total Score 7   Difficulty at work, home, or with people Not difficult at all     ROBBIE-7  11/19/2021   1. Feeling nervous, anxious, or on edge 1   2. Not being able to stop or control worrying 1   3. Worrying too much about different things 1   4. Trouble relaxing 1   5. Being so restless that it is hard to sit still 0   6. Becoming easily annoyed or irritable 0   7. Feeling afraid, as if something awful might happen 0   ROBBIE-7 Total Score 4   If you checked any problems, how difficult have they made it for you to do your work, take care of things at home, or get along with other people? Not difficult at all       Suicide Assessment Five-step Evaluation and Treatment (SAFE-T)

## 2022-03-02 ENCOUNTER — OFFICE VISIT (OUTPATIENT)
Dept: FAMILY MEDICINE | Facility: OTHER | Age: 32
End: 2022-03-02
Attending: FAMILY MEDICINE
Payer: COMMERCIAL

## 2022-03-02 VITALS
DIASTOLIC BLOOD PRESSURE: 70 MMHG | WEIGHT: 189.4 LBS | OXYGEN SATURATION: 98 % | HEART RATE: 79 BPM | TEMPERATURE: 97.7 F | BODY MASS INDEX: 32.01 KG/M2 | SYSTOLIC BLOOD PRESSURE: 112 MMHG

## 2022-03-02 DIAGNOSIS — E66.01 MORBID OBESITY (H): ICD-10-CM

## 2022-03-02 DIAGNOSIS — E06.3 HYPOTHYROIDISM DUE TO HASHIMOTO'S THYROIDITIS: ICD-10-CM

## 2022-03-02 DIAGNOSIS — E28.2 PCOS (POLYCYSTIC OVARIAN SYNDROME): Primary | ICD-10-CM

## 2022-03-02 DIAGNOSIS — F41.1 GAD (GENERALIZED ANXIETY DISORDER): ICD-10-CM

## 2022-03-02 DIAGNOSIS — F33.0 MILD EPISODE OF RECURRENT MAJOR DEPRESSIVE DISORDER (H): ICD-10-CM

## 2022-03-02 DIAGNOSIS — M99.03 SOMATIC DYSFUNCTION OF LUMBAR REGION: ICD-10-CM

## 2022-03-02 LAB — TSH SERPL DL<=0.005 MIU/L-ACNC: 2.18 MU/L (ref 0.4–4)

## 2022-03-02 PROCEDURE — 84480 ASSAY TRIIODOTHYRONINE (T3): CPT | Performed by: FAMILY MEDICINE

## 2022-03-02 PROCEDURE — 84481 FREE ASSAY (FT-3): CPT | Performed by: FAMILY MEDICINE

## 2022-03-02 PROCEDURE — 36415 COLL VENOUS BLD VENIPUNCTURE: CPT | Performed by: FAMILY MEDICINE

## 2022-03-02 PROCEDURE — 99214 OFFICE O/P EST MOD 30 MIN: CPT | Performed by: FAMILY MEDICINE

## 2022-03-02 PROCEDURE — 84443 ASSAY THYROID STIM HORMONE: CPT | Performed by: FAMILY MEDICINE

## 2022-03-02 RX ORDER — SERTRALINE HYDROCHLORIDE 100 MG/1
100 TABLET, FILM COATED ORAL DAILY
Qty: 30 TABLET | Refills: 1 | Status: SHIPPED | OUTPATIENT
Start: 2022-03-02 | End: 2022-04-13

## 2022-03-02 RX ORDER — CYCLOBENZAPRINE HCL 5 MG
5-10 TABLET ORAL 2 TIMES DAILY PRN
Qty: 20 TABLET | Refills: 0 | Status: SHIPPED | OUTPATIENT
Start: 2022-03-02 | End: 2022-03-08

## 2022-03-02 RX ORDER — METFORMIN HCL 500 MG
1500 TABLET, EXTENDED RELEASE 24 HR ORAL AT BEDTIME
Qty: 180 TABLET | Refills: 3 | Status: SHIPPED | OUTPATIENT
Start: 2022-03-02 | End: 2022-07-14

## 2022-03-02 ASSESSMENT — PAIN SCALES - GENERAL: PAINLEVEL: MILD PAIN (3)

## 2022-03-02 NOTE — PATIENT INSTRUCTIONS
Psychologists/ Counselors      Chelita Oquendo   [phone]  [facility]   [phone]  [facility] [phone]  [facility] (kids) [phone]  [facility]teens) [phone]  [facility]  [phone]  [facility]  [phone]  [facility]  [phone]  [facility]  [phone]  [facility] counseling [phone]   [facility]   [phone]   [facility]    [phone]   [facility] [place] [facility].   [phone]   (Zina Peñaloza)  [facility]                          [phone]  (ALINA Grigsby)                                                      [facility]                      [phone]           [facility]  [phone]  [facility] [phone]  [facility]  [phone]  [place] [facility]  [phone]     [facility] [phone]      [facility] [phone]  [facility] counseling [phone]  [facility]   [phone]  [facility] (ALINA Grigsby)                                                   [phone]  Leslie Pedroza  [phone]  [facility] [phone]  [facility]      [phone]    [facility]  [phone]  [facility]      [phone]     [place]  Hari Frosten   [phone]  [facility]      [phone]  [facility] Dr. SATURNINO Omalley [phone]  [facility]      [phone]  [facility]  [phone]    [facility]                      [phone]    *Facilities in bold italics indicate medication management  Services are offered.    Crisis support  Mobile crisis line- [phone]  [facility]: Free online resources including therapy for Mental Health and substance use problems: Http://thriverange.org    Crisis Text Line  Text HOME to 076-419 - The Crisis  Text Line serves anyone, in any type of crisis, providing access to free, 24/7 support and information via the medium people already use and trust  http://www.crisistextline.org   Here's how it works:  Text HOME to 499-576 from anywhere in the [place], anytime, about any type of crisis.  A live, trained Crisis Counselor receives the text and responds quickly.  The volunteer Crisis Counselor will help you move from a 'hot moment to a cool moment'

## 2022-03-02 NOTE — NURSING NOTE
"Chief Complaint   Patient presents with     PCOS       Initial /70 (BP Location: Left arm)   Pulse 79   Temp 97.7  F (36.5  C) (Tympanic)   Wt 85.9 kg (189 lb 6.4 oz)   LMP 02/22/2022   SpO2 98%   BMI 32.01 kg/m   Estimated body mass index is 32.01 kg/m  as calculated from the following:    Height as of 9/3/21: 1.638 m (5' 4.5\").    Weight as of this encounter: 85.9 kg (189 lb 6.4 oz).  Medication Reconciliation: complete  Delma Bella LPN  "

## 2022-03-03 LAB
T3 SERPL-MCNC: 171 NG/DL (ref 60–181)
T3FREE SERPL-MCNC: 4.8 PG/ML (ref 2.3–4.2)

## 2022-03-04 DIAGNOSIS — M99.03 SOMATIC DYSFUNCTION OF LUMBAR REGION: ICD-10-CM

## 2022-03-08 RX ORDER — CYCLOBENZAPRINE HCL 5 MG
TABLET ORAL
Qty: 20 TABLET | Refills: 0 | Status: SHIPPED | OUTPATIENT
Start: 2022-03-08 | End: 2022-07-14

## 2022-03-08 NOTE — TELEPHONE ENCOUNTER
Flexeril   Last Written Prescription Date:  3/2/22  Last Fill Quantity: 20,   # refills: 0  Last Office Visit: 3/2/22  Future Office visit:    Next 5 appointments (look out 90 days)    Apr 13, 2022  9:15 AM  (Arrive by 9:00 AM)  SHORT with Jovana Lambert MD  [facility] ([facility] [place] ) [address]  [place] [address]  [phone]           Routing refill request to provider for review/approval because:  Drug not on the [facility], [facility] or [facility] refill protocol or controlled substance

## 2022-04-11 NOTE — PROGRESS NOTES
Assessment & Plan     ROBBIE (generalized anxiety disorder)  Doing much better, happy with this dose  - sertraline (ZOLOFT) 100 MG tablet; Take 1 tablet (100 mg) by mouth daily    PCOS (polycystic ovarian syndrome)  Metformin is helping, follow-up if not improving, 2 mos    Hypothyroidism due to Hashimoto's thyroiditis  stable    Vitamin B12 deficiency (non anemic)  stable    Mild episode of recurrent major depressive disorder (H)  As above  - sertraline (ZOLOFT) 100 MG tablet; Take 1 tablet (100 mg) by mouth daily       Patient was agreeable to this plan and had no further questions.  There are no Patient Instructions on file for this visit.    No follow-ups on file.    Jovana Lambert MD  [facility] [place] Harper is a 32 year old who presents for the following health issues     HPI     Depression and Anxiety Follow-Up    How are you doing with your depression since your last visit? Improved     How are you doing with your anxiety since your last visit?  Improved- Anxiety is work related right now    Are you having other symptoms that might be associated with depression or anxiety? Yes:  Having trouble sleeping    Have you had a significant life event? No     Do you have any concerns with your use of alcohol or other drugs? No    Social History     Tobacco Use     Smoking status: Never Smoker     Smokeless tobacco: Never Used   Vaping Use     Vaping Use: Never used   Substance Use Topics     Alcohol use: Yes     Alcohol/week: 0.0 standard drinks     Comment: Occasional     Drug use: Never     PHQ 9/3/2021 11/19/2021 4/13/2022   PHQ-9 Total Score 7 7 2   Q9: Thoughts of better off dead/self-harm past 2 weeks Not at all Not at all Not at all     ROBBIE-7 SCORE 9/3/2021 11/19/2021 4/13/2022   Total Score 0 4 3     Last PHQ-9 4/13/2022   1.  Little interest or pleasure in doing things 0   2.  Feeling down, depressed, or hopeless 0   3.  Trouble falling or staying asleep, or sleeping too  much 1   4.  Feeling tired or having little energy 1   5.  Poor appetite or overeating 0   6.  Feeling bad about yourself 0   7.  Trouble concentrating 0   8.  Moving slowly or restless 0   Q9: Thoughts of better off dead/self-harm past 2 weeks 0   PHQ-9 Total Score 2   Difficulty at work, home, or with people Not difficult at all     ROBBIE-7  4/13/2022   1. Feeling nervous, anxious, or on edge 0   2. Not being able to stop or control worrying 1   3. Worrying too much about different things 1   4. Trouble relaxing 1   5. Being so restless that it is hard to sit still 0   6. Becoming easily annoyed or irritable 0   7. Feeling afraid, as if something awful might happen 0   ROBBIE-7 Total Score 3   If you checked any problems, how difficult have they made it for you to do your work, take care of things at home, or get along with other people? Not difficult at all       Suicide Assessment Five-step Evaluation and Treatment (SAFE-T)      How many servings of fruits and vegetables do you eat daily?  4 or more    On average, how many sweetened beverages do you drink each day (Examples: soda, juice, sweet tea, etc.  Do NOT count diet or artificially sweetened beverages)?   0    How many days per week do you exercise enough to make your heart beat faster? 5    How many minutes a day do you exercise enough to make your heart beat faster? 30 - 60    How many days per week do you miss taking your medication? 0    Menstrual Concern- PCOS  Onset/Duration: Age 14  Description:   Duration of bleeding episodes: 5 days  Frequency of periods: (1st day of one to 1st day of next):  every 26 days  Describe bleeding/flow:   Clots: YES  Number of pads/day: 6        Cramping: severe and moderate  Accompanying Signs & Symptoms:  Lightheadedness: no  Temperature intolerance: no  Nosebleeds/Easy bruising: no  Vaginal Discharge: YES- Brown   Acne: YES- Before period  Change in body hair: no  History:  Patient's last menstrual period was  "02/22/2022.  Previous normal periods: no   Contraceptive use: NO  Sexually active: no  Any bleeding after intercourse: no  Abnormal PAP Smears: no  Precipitating or alleviating factors: None  Therapies tried and outcome: Metformin      Insomnia  Onset/Duration: 4 wks  Description:   Frequency of insomnia:  several times a week  Time to fall asleep (sleep latency): 15 minutes  Middle of night awakening:  YES  Early morning awakening:  YES  Progression of Symptoms:  waxing and waning  Accompanying Signs & Symptoms:  Daytime sleepiness/napping: no  Excessive snoring/apnea: no  Restless legs: no  Waking to urinate: no  Chronic pain:  no  Depression symptoms (if yes, do PHQ9): YES  Anxiety symptoms (if yes, do ROBBIE-7): YES  History:  Prior Insomnia: no  New stressful situation: YES  Precipitating factors:   Caffeine intake: no  OTC decongestants: no  Any new medications: no  Alleviating factors:  Self medicating (alcohol, etc.):  no  Stress-reduction (exercise, yoga, meditation etc): YES  Therapies tried and outcome: none        Review of Systems   Constitutional, HEENT, cardiovascular, pulmonary, gi and gu systems are negative, except as otherwise noted.      Objective    /78 (BP Location: Right arm, Patient Position: Sitting, Cuff Size: Adult Regular)   Pulse 72   Temp 97  F (36.1  C) (Tympanic)   Ht 1.638 m (5' 4.5\")   Wt 85.3 kg (188 lb)   LMP 02/22/2022   SpO2 98%   BMI 31.77 kg/m    Body mass index is 31.77 kg/m .  Physical Exam   GENERAL: healthy, alert and no distress  NECK: no adenopathy, no asymmetry, masses, or scars and thyroid normal to palpation  RESP: lungs clear to auscultation - no rales, rhonchi or wheezes  CV: regular rate and rhythm, normal S1 S2, no S3 or S4, no murmur, click or rub, no peripheral edema and peripheral pulses strong  MS: no gross musculoskeletal defects noted, no edema  PSYCH: mentation appears normal, affect normal/bright    No results found for any visits on " 04/13/22.

## 2022-04-13 ENCOUNTER — OFFICE VISIT (OUTPATIENT)
Dept: FAMILY MEDICINE | Facility: OTHER | Age: 32
End: 2022-04-13
Attending: FAMILY MEDICINE
Payer: COMMERCIAL

## 2022-04-13 VITALS
SYSTOLIC BLOOD PRESSURE: 112 MMHG | WEIGHT: 188 LBS | DIASTOLIC BLOOD PRESSURE: 78 MMHG | HEART RATE: 72 BPM | BODY MASS INDEX: 31.32 KG/M2 | TEMPERATURE: 97 F | OXYGEN SATURATION: 98 % | HEIGHT: 65 IN

## 2022-04-13 DIAGNOSIS — E53.8 VITAMIN B12 DEFICIENCY (NON ANEMIC): ICD-10-CM

## 2022-04-13 DIAGNOSIS — E06.3 HYPOTHYROIDISM DUE TO HASHIMOTO'S THYROIDITIS: ICD-10-CM

## 2022-04-13 DIAGNOSIS — F33.0 MILD EPISODE OF RECURRENT MAJOR DEPRESSIVE DISORDER (H): ICD-10-CM

## 2022-04-13 DIAGNOSIS — F41.1 GAD (GENERALIZED ANXIETY DISORDER): Primary | ICD-10-CM

## 2022-04-13 DIAGNOSIS — E28.2 PCOS (POLYCYSTIC OVARIAN SYNDROME): ICD-10-CM

## 2022-04-13 PROCEDURE — 99214 OFFICE O/P EST MOD 30 MIN: CPT | Performed by: FAMILY MEDICINE

## 2022-04-13 RX ORDER — SERTRALINE HYDROCHLORIDE 100 MG/1
100 TABLET, FILM COATED ORAL DAILY
Qty: 90 TABLET | Refills: 2 | Status: SHIPPED | OUTPATIENT
Start: 2022-04-13 | End: 2023-02-09

## 2022-04-13 ASSESSMENT — ANXIETY QUESTIONNAIRES
5. BEING SO RESTLESS THAT IT IS HARD TO SIT STILL: NOT AT ALL
IF YOU CHECKED OFF ANY PROBLEMS ON THIS QUESTIONNAIRE, HOW DIFFICULT HAVE THESE PROBLEMS MADE IT FOR YOU TO DO YOUR WORK, TAKE CARE OF THINGS AT HOME, OR GET ALONG WITH OTHER PEOPLE: NOT DIFFICULT AT ALL
2. NOT BEING ABLE TO STOP OR CONTROL WORRYING: SEVERAL DAYS
4. TROUBLE RELAXING: SEVERAL DAYS
6. BECOMING EASILY ANNOYED OR IRRITABLE: NOT AT ALL
GAD7 TOTAL SCORE: 3
7. FEELING AFRAID AS IF SOMETHING AWFUL MIGHT HAPPEN: NOT AT ALL
1. FEELING NERVOUS, ANXIOUS, OR ON EDGE: NOT AT ALL
3. WORRYING TOO MUCH ABOUT DIFFERENT THINGS: SEVERAL DAYS

## 2022-04-13 ASSESSMENT — PATIENT HEALTH QUESTIONNAIRE - PHQ9: SUM OF ALL RESPONSES TO PHQ QUESTIONS 1-9: 2

## 2022-04-13 ASSESSMENT — PAIN SCALES - GENERAL: PAINLEVEL: MODERATE PAIN (4)

## 2022-04-13 NOTE — NURSING NOTE
"Chief Complaint   Patient presents with     Anxiety     Depression     PCOS       Initial /78 (BP Location: Right arm, Patient Position: Sitting, Cuff Size: Adult Regular)   Pulse 72   Temp 97  F (36.1  C) (Tympanic)   Ht 1.638 m (5' 4.5\")   Wt 85.3 kg (188 lb)   LMP 02/22/2022   SpO2 98%   BMI 31.77 kg/m   Estimated body mass index is 31.77 kg/m  as calculated from the following:    Height as of this encounter: 1.638 m (5' 4.5\").    Weight as of this encounter: 85.3 kg (188 lb).  Medication Reconciliation: complete  Delma Bella LPN  "

## 2022-04-14 ASSESSMENT — ANXIETY QUESTIONNAIRES: GAD7 TOTAL SCORE: 3

## 2022-05-01 DIAGNOSIS — E06.3 HYPOTHYROIDISM DUE TO HASHIMOTO'S THYROIDITIS: ICD-10-CM

## 2022-05-03 RX ORDER — TRIAMCINOLONE ACETONIDE 1 MG/G
CREAM TOPICAL 2 TIMES DAILY
Qty: 45 G | Refills: 1 | Status: SHIPPED | OUTPATIENT
Start: 2022-05-03 | End: 2023-11-09

## 2022-05-12 ENCOUNTER — TELEPHONE (OUTPATIENT)
Dept: FAMILY MEDICINE | Facility: OTHER | Age: 32
End: 2022-05-12
Payer: COMMERCIAL

## 2022-05-12 DIAGNOSIS — H47.10 PAPILLEDEMA OF BOTH EYES: Primary | ICD-10-CM

## 2022-05-12 NOTE — TELEPHONE ENCOUNTER
Meredith Gordon would like to request a referral.   Reason: Ongoing care/treatment for papillidema   Requested provider: Dr. Raegan Amin, [facility]   Comment:   My original eye appointment for May 3 was rescheduled to May 25. Please send a new referral to [facility]. Fax # [phone]. Address [address], [place] [address]

## 2022-07-12 ENCOUNTER — MYC MEDICAL ADVICE (OUTPATIENT)
Dept: FAMILY MEDICINE | Facility: OTHER | Age: 32
End: 2022-07-12

## 2022-07-14 ENCOUNTER — OFFICE VISIT (OUTPATIENT)
Dept: FAMILY MEDICINE | Facility: OTHER | Age: 32
End: 2022-07-14
Attending: FAMILY MEDICINE
Payer: COMMERCIAL

## 2022-07-14 VITALS
HEART RATE: 73 BPM | DIASTOLIC BLOOD PRESSURE: 70 MMHG | OXYGEN SATURATION: 99 % | RESPIRATION RATE: 20 BRPM | WEIGHT: 188.4 LBS | SYSTOLIC BLOOD PRESSURE: 118 MMHG | TEMPERATURE: 98.3 F | BODY MASS INDEX: 31.84 KG/M2

## 2022-07-14 DIAGNOSIS — K64.5 THROMBOSED EXTERNAL HEMORRHOIDS: Primary | ICD-10-CM

## 2022-07-14 DIAGNOSIS — E28.2 PCOS (POLYCYSTIC OVARIAN SYNDROME): ICD-10-CM

## 2022-07-14 PROCEDURE — 99214 OFFICE O/P EST MOD 30 MIN: CPT | Performed by: FAMILY MEDICINE

## 2022-07-14 RX ORDER — METFORMIN HCL 500 MG
1000 TABLET, EXTENDED RELEASE 24 HR ORAL AT BEDTIME
Qty: 180 TABLET | Refills: 3 | Status: SHIPPED | OUTPATIENT
Start: 2022-07-14 | End: 2022-08-04

## 2022-07-14 ASSESSMENT — PAIN SCALES - GENERAL: PAINLEVEL: NO PAIN (0)

## 2022-07-14 NOTE — NURSING NOTE
"Chief Complaint   Patient presents with     Rectal Problem       Initial /70 (BP Location: Right arm, Patient Position: Sitting, Cuff Size: Adult Regular)   Pulse 73   Temp 98.3  F (36.8  C) (Tympanic)   Resp 20   Wt 85.5 kg (188 lb 6.4 oz)   SpO2 99%   BMI 31.84 kg/m   Estimated body mass index is 31.84 kg/m  as calculated from the following:    Height as of 4/13/22: 1.638 m (5' 4.5\").    Weight as of this encounter: 85.5 kg (188 lb 6.4 oz).  Medication Reconciliation: complete  Erica Singh LPN  "

## 2022-07-14 NOTE — PROGRESS NOTES
Assessment & Plan     Thrombosed external hemorrhoids  Discussed possible management  - Adult General Surg Referral    PCOS (polycystic ovarian syndrome)  Decrease metformin to 1000mg, she tolerated that, start NAC BID  Follow-up 3 mos  - metFORMIN (GLUCOPHAGE XR) 500 MG 24 hr tablet; Take 2 tablets (1,000 mg) by mouth At Bedtime    Provider  Link to [facility] Help Grid :[id number]}    Patient was agreeable to this plan and had no further questions.  Patient Instructions   1.  N acetyl cysteine (NAC)  1 tab/cap 2x/day      No follow-ups on file.    Jovana aLmbert MD  [facility] - [place] Harper is a 32 year old, presenting for the following health issues:  Rectal Problem      HPI     Hemorrhoids  Onset/Duration: prior  Description:   Yaneth-anal lump: YES  Pain: YES- when having a bowel movement  Itching: YES  Accompanying Signs & Symptoms:  Blood in stool: YES- at times  Changes in stool pattern: YES- diarrhea  History:   Any previous GI studies done:none  Family History of colon cancer: No  Precipitating factors:   None  Alleviating factors:  None  Therapies tried and outcome: increased fiber in diet, preparation H, sitz baths and ice    Concern - PCOS, new diarrhea  Onset: 2-3 months  Description: was started on higher dose of metformin up to 1500mg and has had diarrhea  Intensity: moderate  Progression of Symptoms:  same  Accompanying Signs & Symptoms: worsening hemorrhoids, weight gain  Previous history of similar problem: no  Precipitating factors:        Worsened by: 500mg increase of metformin  Alleviating factors:        Improved by: nothing  Therapies tried and outcome: preparation h, etc      Review of Systems   Constitutional, HEENT, cardiovascular, pulmonary, gi and gu systems are negative, except as otherwise noted.      Objective    /70 (BP Location: Right arm, Patient Position: Sitting, Cuff Size: Adult Regular)   Pulse 73   Temp 98.3  F (36.8  C) (Tympanic)   Resp 20    Wt 85.5 kg (188 lb 6.4 oz)   SpO2 99%   BMI 31.84 kg/m    Body mass index is 31.84 kg/m .  Physical Exam   GENERAL: healthy, alert and no distress  RESP: lungs clear to auscultation - no rales, rhonchi or wheezes  CV: regular rate and rhythm, normal S1 S2, no S3 or S4, no murmur, click or rub, no peripheral edema and peripheral pulses strong  ABDOMEN: soft, nontender, no hepatosplenomegaly, no masses and bowel sounds normal  RECTAL (female): thrombosed external hemorrhoid tender to touch, small approx 1.5cm, other external hemorrhoid stable  PSYCH: mentation appears normal, affect normal/bright    No results found for any visits on 07/14/22.              .  ..

## 2022-07-21 ENCOUNTER — OFFICE VISIT (OUTPATIENT)
Dept: SURGERY | Facility: OTHER | Age: 32
End: 2022-07-21
Attending: FAMILY MEDICINE
Payer: COMMERCIAL

## 2022-07-21 ENCOUNTER — PREP FOR PROCEDURE (OUTPATIENT)
Dept: SURGERY | Facility: OTHER | Age: 32
End: 2022-07-21

## 2022-07-21 VITALS
SYSTOLIC BLOOD PRESSURE: 106 MMHG | WEIGHT: 188 LBS | DIASTOLIC BLOOD PRESSURE: 74 MMHG | OXYGEN SATURATION: 97 % | BODY MASS INDEX: 31.32 KG/M2 | HEART RATE: 76 BPM | HEIGHT: 65 IN | TEMPERATURE: 97.9 F

## 2022-07-21 DIAGNOSIS — K64.9 HEMORRHOIDS: Primary | ICD-10-CM

## 2022-07-21 DIAGNOSIS — K64.5 THROMBOSED EXTERNAL HEMORRHOIDS: ICD-10-CM

## 2022-07-21 PROCEDURE — 99213 OFFICE O/P EST LOW 20 MIN: CPT | Performed by: CLINICAL NURSE SPECIALIST

## 2022-07-21 ASSESSMENT — PAIN SCALES - GENERAL: PAINLEVEL: NO PAIN (0)

## 2022-07-21 NOTE — PATIENT INSTRUCTIONS
If you have questions, you may contact us at the any of the following numbers:     [facility] Health Unit Coordinator: [phone]  Clinic Surgery Nurse (Lindsey): [phone]  [facility] Surgery Education Nurse: [phone]    You are scheduled for: Rectal Exam Under Anesthesia with Dr. Singh on 8/9/22.  Admit Time: Hospital Surgery will call you the day before your procedure by 5pm with your arrival time.   If your surgery is on Monday, expect a call on Friday.   If you are not contacted before 5PM, please call admitting at [phone].   After hours or on weekends, please call [phone] to postpone.   Call the clinic surgery nurse if you become ill within 1-2 weeks of your procedure to reschedule.   This includes fever, chills, sore throat, cough, chest congestion, runny nose, or any other symptom of any other illness.     COVID-19 test is needed prior to procedure. Please see handout for additional information.    Please call the Hospital Surgery Education Nurse at [phone] 1 week prior to your procedure and have a medication list ready.   Do not take Aspirin or other NSAIDS (Ibuprofen, Motrin, Aleve, Celebrex, Naproxen, etc), vitamins/supplements 7 days before your surgery unless you have been otherwise directed.   If you are prescribed a daily 81mg Aspirin, you may continue this.   If you are prescribed blood thinners or insulin, please contact your primary care provider for instructions.    Please purchase a fleets enema and give to yourself the night prior to procedure. Another will be given to you at the hospital.       Do not have anything to eat or drink after midnight the night before your surgery (or 8 hours prior to surgery).  You may have clear liquids up until 2 hours prior to arrival time, and then nothing by mouth. See table below.  Do not eat, drink, chew gum, suck on hard candy, or smoke after 2 hours prior to arrival. You can brush your teeth.   If you are directed to take any medications, take  them with a tiny sip of water.   If you use an inhaler, bring it with you.  Arrive in clean, comfortable clothing.   Do not wear any jewelry, make-up, nail polish, lotions, hair products, or perfumes.   [place] in hospital admitting through the [place] entrance.  A responsible adult must be available to drive you home and stay with you for 24 hours at home. If you need to take a taxi or the bus, you must have another responsible adult to ride with you. Your procedure will be cancelled if you do not have a responsible adult .     Clear Liquid Diet  You may have: Do NOT have:     ? Tea, coffee (no cream)   Milk or milk products such as ice cream, malts or shakes     ? Water, Vitamin Water, Smart Water, Coconut Water, PowerAde, Propel, Soda pop, (Sprite, 7 UP, Ginger Ale, Gatorade (not red or purple)   Red or purple drinks of any kind such as  Cranberry juice or grape juice.     ? Clear nutrition drinks (Resource Breeze, Ensure Active protein drink (peach flavor)   Red or purple Jell-O, Popsicles, Joseph-Aid, Sorbet and candy.     ? Jell-O, Popsicles (no milk or fruit pieces) or Italian ice (not red or purple)   Juices with pulp such as orange, grapefruit, pineapple or tomato juice     ? Honey/Sugar   Cream soups of any kind     ? Fat-free soup broth or bouillon   Alcohol     ? Plain hard candy, such as clear Life Savers (not red or purple)      ? Powdered Lemonade such as Crystal Light, Country Time      ? Clear juices and fruit-flavored drinks such as apple juice, white grape juice, Hi-C and Joseph-Aid (not red or purple)              SURGERY HANDBOOK            Your surgery is scheduled:    Date: 8/9/22  ________________________________    Time: hospital surgery will call the day prior to your procedure with arrival time  ________________________________      Surgeon's Name: Dr. Singh  _______________________        Pre-Op Physical Fax Numbers:          Pre-Admissions  [phone]        Your surgery is located  at:  [facility]  [address] [place] [id number]         Before Your Surgery  For Patients and Visitors at [place]    Welcome  As you get ready for surgery, you may have a lot of questions.  This brochure will help you know what to expect before and after surgery.  You and your family are the most important members of your health care team.  You will need to take an active role in your care.    Be sure to ask questions and learn all that you can about your surgery.  If you have any safety concerns, we urge you to tell a nurse as soon as possible.   This brochure is for information only.  It does not replace the advice of your doctor.  Always follow your doctor's advice.    If you or your  are deaf or hard of hearing, or prefer a language other than English, please let us know.  We have many free services, including interpreters and other aids to help you communicate. You may ask for help  through any member of your care team or by calling Language Services at [phone], option 2.    GETTING READY FOR SURGERY  Always follow your surgeon's instructions.  If you don't, your surgery could be canceled.  Please use the following checklist.  You have been scheduled for surgery and we would like to give you some information that will assist in helping get the best possible outcome.      Before Surgery:   If for any reason you decide not to have the surgery, please contact your surgeon's office.  We can easily cancel or reschedule the procedure. If it is after hours, please call [phone].    Please keep in mind that the time of surgery is subject to change.  Make sure you have nothing to eat after midnight. If your surgery is later in the afternoon, this recommendation might change, but not until the day before surgery after the actual time of the surgery has been established.        Within 30 Days of Surgery: NOT NEEDED  Have a pre-surgery physical exam with your family  doctor or partner.  If you use a [facility], all of your information from the pre-op physical will be in the [employer] computer system.  Ask the doctor to send all of your results to the hospital before the surgery.  The doctor also may ask you to bring the results with you on the day of surgery or you can fax them to [phone].    Tell the doctor if:  You are allergic to latex or rubber  (Latex and rubber gloves are often used in medical care).  You are taking any medicines (including aspirin), vitamins (Vitamin E, Fish Oil, Omegas) or herbal products.  You will need to stop taking some medicines before surgery.  You have any medical problems (allergies, diabetes or heart disease, for example).  You have a pacemaker or an AICD (automatic implanted cardiac defibrillator).  If you do, please bring the ID card with you on the day of surgery.  You are a smoker.  People who smoke have a higher risk of infection after surgery.  Ask your doctor how you can quit smoking.    Within 7 days of Surgery:  Prior to your surgical procedure, a nurse will be contacting you to obtain a health history. A nurse will call you within a few days of surgery to go over these and other instructions.  If you do not hear from them, please call them at [phone].      Call your insurance company.  Ask if you need pre-approval for your surgery.  If you do not have insurance, please let us know.  Arrange for someone to drive you home after surgery.  If you will have same-day surgery, you may not drive or take public transportation home by yourself.  Arrange for someone to stay with you for 24 hours after you go home.  This person must be a responsible adult (ie- Family member or friend).    The Day Before Surgery:   Call your surgeon if there are any changes in your health.  This includes signs of a cold or flu (such as a sore throat, runny nose, cough, rash or fever).  Do not smoke, drink alcohol or take over-the-counter medicine  (unless your surgeon tells you to) for 24 hours before and after surgery.  If you take prescribed drugs:  You may need to stop them until after the surgery.  Follow your doctor's orders.  You may resume Aspirin and/or blood thinners after your surgery as directed by your physician/surgeon.  NO SOLID FOOD. CLEAR LIQUIDS ONLY.  Follow your surgeon's orders for eating and drinking.  You need to have an empty stomach before surgery.  This will make the surgery as safe as possible.  If you don't follow your doctor's orders, your surgery could be changed to another date.    The Day of Surgery:  Please remove deodorant, cologne, scented lotion, makeup, nail polish and jewelry (including rings and body piercings).  If you wear artificial nails, please remove at least one nail before coming to the hospital.  Wear clean, loose clothing to the hospital.  Bring these items to the hospital:  Your insurance card.  A list of all the medicines you take.  Include vitamins, minerals, herbs and over-the-counter drugs.  Note any drug allergies.  A copy of your advance health care directive, if you have one.  This tells us what treatment you would want -- and who would make health care decisions -- if you could no longer speak for yourself.  You may request this form in advance or download it from [email or web address].  A case for any glasses, contact lenses, hearing aids or dentures.  Your inhaler or CPAP machine, if you use these at home.  Leave extra cash, jewelry and other valuables at home.    When You Arrive:  When you get to the hospital, you will:  Check in.  If you are under age 18, you must be with a parent or legal guardian.  Electronically sign consent forms, if you haven't already.  These electronic forms state that you know the risks and benefits of surgery.  When you sign the forms, you give us permission to do the surgery.  Do not sign them unless you understand what will happen during and after your surgery.  If you  have any questions about your surgery, ask to speak with your doctor before you sign the forms.  If you don't understand the answers, ask again.  Receive a copy of the Patients Bill of Rights.  If you do not receive a copy, please ask for one.  Change into hospital clothes.  Your belongings will be placed in a bag.  We will return them to you after surgery.  Meet with the anesthesia provider.  He or she will tell you what kind of anesthesia (medicine) will be used to keep you comfortable during surgery.  Remember: It's okay to remind doctors and nurses to wash their hands before touching you.   In most cases, your surgeon will use a marker to write his or her initials on the surgery site.  This ensures that the exact site is operated on.  For safety reasons, we will ask you the same questions many times.  For example, we may ask your name and birth date over and over again.  Friends and family can stay with you until it's time for surgery.  While you're in surgery, they will be in the waiting area.  Please note that cell phones are not allowed in some patient care areas.  If you have questions about what will happen in the operating room, talk to your care team.    After Surgery:  We will move you to a recovery room where we will watch you closely.  If you have any pain or discomfort, tell your nurse.  He or she will try to make you comfortable.    If you are staying overnight we will move you to your hospital room after you are awake.  If you are going home we will move you to another room.  Friends and family may be able to join you.  The length of time you spend in recovery depends on the type of medicine you received, your medical condition, and the type of surgery you had.    Going Home:  We will let you know when you're ready to leave the hospital.  Before you leave, we will tell you how to care for yourself at home.  If you do not understand something, please say so.  We will answer any questions you have.   We will then help you get ready to leave.  When you are discharged from the recovery room, the nurses will review instructions with you and your caregiver.  You must have an adult with you for the first 4 hours after you leave the hospital. Take it easy when you get home.  You will need some time to recover -- you may be more tired than you realize at first.  Rest and relax for rest of the day at home.  You'll feel better and heal faster if you take good care of yourself.  Symptoms of infection need to be reported to your surgery office. Please call your Surgeon.      Thank you for following these important instructions.

## 2022-07-21 NOTE — NURSING NOTE
"Chief Complaint   Patient presents with     Consult     Thrombosed external hemorrhoids        Initial /74   Pulse 76   Temp 97.9  F (36.6  C)   Ht 1.638 m (5' 4.5\")   Wt 85.3 kg (188 lb)   SpO2 97%   BMI 31.77 kg/m   Estimated body mass index is 31.77 kg/m  as calculated from the following:    Height as of this encounter: 1.638 m (5' 4.5\").    Weight as of this encounter: 85.3 kg (188 lb).  Medication Reconciliation: complete  Lindsey Gaming LPN  "

## 2022-07-25 NOTE — PROGRESS NOTES
SUBJECTIVE:  Meredith Gordon, 32 year old, female presents with the following Chief Complaint(s) with HPI to follow:   Chief Complaint   Patient presents with     Consult     Thrombosed external hemorrhoids           HPI:  Meredith is here for the assessment of external hemorrhoids.  She reports struggling with external hemorrhoids for a long time, but they have become very bothersome over the last few months.  She often has blood with bowel movements, they are painful, and itch at times.     Meredith is seen with her mother, Katya, in attendance today.    Patient Active Problem List   Diagnosis     PCOS (polycystic ovarian syndrome)     ACP (advance care planning)     Hypothyroidism due to Hashimoto's thyroiditis     Hyperlipidemia LDL goal <130     Iron deficiency     Rosacea     Enlarged lymph nodes     Sebaceous cyst     Weight gain     Adjustment disorder with depressed mood     Obesity (BMI 35.0-39.9) with comorbidity (H)     Other acne     Papilledema of both eyes     Spinal puncture headache     Vitamin B12 deficiency (non anemic)     Metrorrhagia     Hypokalemia     Insomnia, unspecified type     Somatic dysfunction of lumbar region     ROBBIE (generalized anxiety disorder)     Mild episode of recurrent major depressive disorder (H)     Thrombosed external hemorrhoids       Past Medical History:   Diagnosis Date     2019 novel coronavirus disease (COVID-19) 08/2021     B12 deficiency 06/25/2012    resolved     Hashimoto's disease 09/02/2011     Mild scoliosis 2022     Nephrolithiasis     2011 in [facility] (at [facility])     PCOS (polycystic ovarian syndrome)      Recurrent major depressive disorder, in partial remission (H)        Past Surgical History:   Procedure Laterality Date     MAMMOPLASTY REDUCTION  2015     wisdom teeth         Family History   Problem Relation Age of Onset     Thyroid Disease Mother         hypothyroidism     Nephrolithiasis Father      Other - See Comments Sister         pcos      Family History Negative Sister      Family History Negative Sister      Family History Negative Brother      Depression Brother      Thyroid Disease Brother      Family History Negative Brother      Hypothyroidism Maternal Grandmother      Thyroid Disease Maternal Grandmother      Cerebrovascular Disease Maternal Grandmother 76     Rheumatoid Arthritis Maternal Grandfather      Diabetes Paternal Grandfather         type 2     Hypertension Paternal Grandfather        Social History     Tobacco Use     Smoking status: Never Smoker     Smokeless tobacco: Never Used   Substance Use Topics     Alcohol use: Yes     Alcohol/week: 0.0 standard drinks     Comment: Occasional       Current Outpatient Medications   Medication Sig Dispense Refill     ARMOUR THYROID 30 MG tablet Take 1 Tab by mouth one time a day. 90 tablet 2     liothyronine (CYTOMEL) 5 MCG tablet Take 1 Tab by mouth one time a day. 90 tablet 2     metFORMIN (GLUCOPHAGE XR) 500 MG 24 hr tablet Take 2 tablets (1,000 mg) by mouth At Bedtime 180 tablet 3     sertraline (ZOLOFT) 100 MG tablet Take 1 tablet (100 mg) by mouth daily 90 tablet 2     thyroid (ARMOUR THYROID) 60 MG tablet Take 1 Tab by mouth one time a day - take in addition to the 30mg daily 90 tablet 2     triamcinolone (KENALOG) 0.1 % external cream Apply topically 2 times daily 45 g 1     vitamin D3 (CHOLECALCIFEROL) 2000 units (50 mcg) tablet Take 2 tablets (4,000 Units) by mouth daily 90 tablet 1       Allergies   Allergen Reactions     Cats Itching     Cat/feline product derivatives       REVIEW OF SYSTEMS  Skin: negative  Eyes: Positive for hx of papilledema of both eyes - stable per her report  Ears/Nose/Throat: negative  Respiratory: No shortness of breath, dyspnea on exertion, cough, or hemoptysis  Cardiovascular: negative  Gastrointestinal: negative  Genitourinary: negative  Musculoskeletal: negative  Neurologic: negative  Psychiatric: positive for depression  stable  Hematologic/Lymphatic/Immunologic: negative  Endocrine: positive for thyroid disorder    OBJECTIVE:    B/P: 106/74, T: 97.9, P: 76, R: Data Unavailable, W: 188 lbs 0 oz, BMI: Body mass index is 31.77 kg/m .  Constitutional: healthy, alert and no distress  Head: Normocephalic. No masses, lesions, tenderness or abnormalities  Cardiovascular: RRR. No murmurs, clicks gallops or rub  Respiratory:  Good diaphragmatic excursion. Lungs clear  Gastrointestinal: Abdomen soft, non-tender.  Circumferential hemorrhoid skin tags noted, grade 3  : Deferred  Musculoskeletal: extremities normal- no gross deformities noted, gait normal and normal muscle tone  Skin: no suspicious lesions or rashes  Neurologic: Gait normal. Sensation grossly WNL.  Psychiatric: mentation appears normal and affect normal/bright      ASSESSMENT / PLAN:  #1 Hemorrhoidectomy    Comment: We had a demetrio discussion regarding hemorrhoidectomy and the discomfort associated with it.  I was clear that we recommend hemorrhoid removal if it is interfering with quality of life.  Meredith reports the pain, bleeding, and itching are relentless - worse over the last two months.     Plan: Meredith is a satisfactory candidate for hemorrhoid removal.  We discussed treatment options including doing nothing, continuing with topical treatment, or excision.  We had a demetrio discussion related to the risks and benefits related to hemorrhoidectomy including but not limited to pain, bleeding, infection, wound break down.  Their questions were asked and answered.  She would like to proceed with the hemorrhoidectomy.  We will schedule her for hemorrhoidectomy with Dr. Singh at a mutually convenient time.         Penelope Rosa CNS  Surgery and Wound Care  [facility]

## 2022-07-25 NOTE — H&P (VIEW-ONLY)
SUBJECTIVE:  Meredith Gordon, 32 year old, female presents with the following Chief Complaint(s) with HPI to follow:   Chief Complaint   Patient presents with     Consult     Thrombosed external hemorrhoids           HPI:  Meredith is here for the assessment of external hemorrhoids.  She reports struggling with external hemorrhoids for a long time, but they have become very bothersome over the last few months.  She often has blood with bowel movements, they are painful, and itch at times.     Meredith is seen with her mother, Katya, in attendance today.    Patient Active Problem List   Diagnosis     PCOS (polycystic ovarian syndrome)     ACP (advance care planning)     Hypothyroidism due to Hashimoto's thyroiditis     Hyperlipidemia LDL goal <130     Iron deficiency     Rosacea     Enlarged lymph nodes     Sebaceous cyst     Weight gain     Adjustment disorder with depressed mood     Obesity (BMI 35.0-39.9) with comorbidity (H)     Other acne     Papilledema of both eyes     Spinal puncture headache     Vitamin B12 deficiency (non anemic)     Metrorrhagia     Hypokalemia     Insomnia, unspecified type     Somatic dysfunction of lumbar region     ROBBIE (generalized anxiety disorder)     Mild episode of recurrent major depressive disorder (H)     Thrombosed external hemorrhoids       Past Medical History:   Diagnosis Date     2019 novel coronavirus disease (COVID-19) 08/2021     B12 deficiency 06/25/2012    resolved     Hashimoto's disease 09/02/2011     Mild scoliosis 2022     Nephrolithiasis     2011 in [facility] (at [facility])     PCOS (polycystic ovarian syndrome)      Recurrent major depressive disorder, in partial remission (H)        Past Surgical History:   Procedure Laterality Date     MAMMOPLASTY REDUCTION  2015     wisdom teeth         Family History   Problem Relation Age of Onset     Thyroid Disease Mother         hypothyroidism     Nephrolithiasis Father      Other - See Comments Sister         pcos      Family History Negative Sister      Family History Negative Sister      Family History Negative Brother      Depression Brother      Thyroid Disease Brother      Family History Negative Brother      Hypothyroidism Maternal Grandmother      Thyroid Disease Maternal Grandmother      Cerebrovascular Disease Maternal Grandmother 76     Rheumatoid Arthritis Maternal Grandfather      Diabetes Paternal Grandfather         type 2     Hypertension Paternal Grandfather        Social History     Tobacco Use     Smoking status: Never Smoker     Smokeless tobacco: Never Used   Substance Use Topics     Alcohol use: Yes     Alcohol/week: 0.0 standard drinks     Comment: Occasional       Current Outpatient Medications   Medication Sig Dispense Refill     ARMOUR THYROID 30 MG tablet Take 1 Tab by mouth one time a day. 90 tablet 2     liothyronine (CYTOMEL) 5 MCG tablet Take 1 Tab by mouth one time a day. 90 tablet 2     metFORMIN (GLUCOPHAGE XR) 500 MG 24 hr tablet Take 2 tablets (1,000 mg) by mouth At Bedtime 180 tablet 3     sertraline (ZOLOFT) 100 MG tablet Take 1 tablet (100 mg) by mouth daily 90 tablet 2     thyroid (ARMOUR THYROID) 60 MG tablet Take 1 Tab by mouth one time a day - take in addition to the 30mg daily 90 tablet 2     triamcinolone (KENALOG) 0.1 % external cream Apply topically 2 times daily 45 g 1     vitamin D3 (CHOLECALCIFEROL) 2000 units (50 mcg) tablet Take 2 tablets (4,000 Units) by mouth daily 90 tablet 1       Allergies   Allergen Reactions     Cats Itching     Cat/feline product derivatives       REVIEW OF SYSTEMS  Skin: negative  Eyes: Positive for hx of papilledema of both eyes - stable per her report  Ears/Nose/Throat: negative  Respiratory: No shortness of breath, dyspnea on exertion, cough, or hemoptysis  Cardiovascular: negative  Gastrointestinal: negative  Genitourinary: negative  Musculoskeletal: negative  Neurologic: negative  Psychiatric: positive for depression  stable  Hematologic/Lymphatic/Immunologic: negative  Endocrine: positive for thyroid disorder    OBJECTIVE:    B/P: 106/74, T: 97.9, P: 76, R: Data Unavailable, W: 188 lbs 0 oz, BMI: Body mass index is 31.77 kg/m .  Constitutional: healthy, alert and no distress  Head: Normocephalic. No masses, lesions, tenderness or abnormalities  Cardiovascular: RRR. No murmurs, clicks gallops or rub  Respiratory:  Good diaphragmatic excursion. Lungs clear  Gastrointestinal: Abdomen soft, non-tender.  Circumferential hemorrhoid skin tags noted, grade 3  : Deferred  Musculoskeletal: extremities normal- no gross deformities noted, gait normal and normal muscle tone  Skin: no suspicious lesions or rashes  Neurologic: Gait normal. Sensation grossly WNL.  Psychiatric: mentation appears normal and affect normal/bright      ASSESSMENT / PLAN:  #1 Hemorrhoidectomy    Comment: We had a demetrio discussion regarding hemorrhoidectomy and the discomfort associated with it.  I was clear that we recommend hemorrhoid removal if it is interfering with quality of life.  Meredith reports the pain, bleeding, and itching are relentless - worse over the last two months.     Plan: Meredith is a satisfactory candidate for hemorrhoid removal.  We discussed treatment options including doing nothing, continuing with topical treatment, or excision.  We had a demertio discussion related to the risks and benefits related to hemorrhoidectomy including but not limited to pain, bleeding, infection, wound break down.  Their questions were asked and answered.  She would like to proceed with the hemorrhoidectomy.  We will schedule her for hemorrhoidectomy with Dr. Singh at a mutually convenient time.         Penelope Rosa CNS  Surgery and Wound Care  [facility]

## 2022-08-01 ENCOUNTER — ANESTHESIA EVENT (OUTPATIENT)
Dept: SURGERY | Facility: HOSPITAL | Age: 32
End: 2022-08-01
Payer: COMMERCIAL

## 2022-08-01 NOTE — ANESTHESIA PREPROCEDURE EVALUATION
Anesthesia Pre-Procedure Evaluation    Patient: Meredith Gordon   MRN: [id number] : 1990        Procedure : Procedure(s):  EXAM UNDER ANESTHESIA, RECTUM  Hemorrhoidectomy          Past Medical History:   Diagnosis Date     2019 novel coronavirus disease (COVID-19) 2021     B12 deficiency 2012    resolved     Hashimoto's disease 2011     Mild scoliosis      Nephrolithiasis      in [facility] (at [facility])     PCOS (polycystic ovarian syndrome)      Recurrent major depressive disorder, in partial remission (H)       Past Surgical History:   Procedure Laterality Date     MAMMOPLASTY REDUCTION       wisdom teeth        Allergies   Allergen Reactions     Cats Itching     Cat/feline product derivatives      Social History     Tobacco Use     Smoking status: Never Smoker     Smokeless tobacco: Never Used   Substance Use Topics     Alcohol use: Yes     Alcohol/week: 0.0 standard drinks     Comment: Occasional      Wt Readings from Last 1 Encounters:   22 85.3 kg (188 lb)        Anesthesia Evaluation   Pt has had prior anesthetic. Type: General and MAC.    No history of anesthetic complications       ROS/MED HX  ENT/Pulmonary:  - neg pulmonary ROS     Neurologic: Comment: Mild scoliosis  History of spinal puncture headache      Cardiovascular:  - neg cardiovascular ROS     METS/Exercise Tolerance:     Hematologic: Comments: Vitamin B12 deficiency - neg hematologic  ROS     Musculoskeletal:  - neg musculoskeletal ROS     GI/Hepatic: Comment: Thrombosed external hemorrhoids      Renal/Genitourinary: Comment: Polycystic ovarian disease  Metrorrhagia    (+) Nephrolithiasis ,     Endo:     (+) thyroid problem (hashimotos disease), hypothyroidism, Obesity,     Psychiatric/Substance Use:     (+) psychiatric history depression and anxiety (adjustment disorder)     Infectious Disease:  - neg infectious disease ROS     Malignancy:  - neg malignancy ROS     Other: Comment: Papilledema both  eyes - neg other ROS          Physical Exam    Airway  airway exam normal      Mallampati: I   TM distance: > 3 FB   Neck ROM: full   Mouth opening: > 3 cm    Respiratory Devices and Support         Dental  no notable dental history         Cardiovascular   cardiovascular exam normal       Rhythm and rate: regular and normal     Pulmonary   pulmonary exam normal        breath sounds clear to auscultation           OUTSIDE LABS:  CBC:   Lab Results   Component Value Date    WBC 11.4 (H) 05/26/2020    WBC 7.6 10/17/2019    HGB 12.9 05/26/2020    HGB 12.5 10/17/2019    HCT 39.9 05/26/2020    HCT 40.2 10/17/2019     05/26/2020     (H) 10/17/2019     BMP:   Lab Results   Component Value Date     11/21/2021     09/03/2021    POTASSIUM 3.4 11/21/2021    POTASSIUM 3.2 (L) 09/03/2021    CHLORIDE 115 (H) 11/21/2021    CHLORIDE 115 (H) 09/03/2021    CO2 21 11/21/2021    CO2 20 09/03/2021    BUN 13 11/21/2021    BUN 12 09/03/2021    CR 0.90 11/21/2021    CR 0.85 09/03/2021     (H) 11/21/2021    GLC 87 09/03/2021     COAGS:   Lab Results   Component Value Date    INR 0.94 05/26/2020     POC:   Lab Results   Component Value Date    HCG Negative 05/26/2020     HEPATIC:   Lab Results   Component Value Date    ALBUMIN 4.0 09/03/2021    PROTTOTAL 7.8 09/03/2021    ALT 29 09/03/2021    AST 12 09/03/2021    ALKPHOS 71 09/03/2021    BILITOTAL 0.3 09/03/2021     OTHER:   Lab Results   Component Value Date    COOPER 8.7 11/21/2021    TSH 2.18 03/02/2022    T4 0.67 (L) 05/26/2020    T3 171 03/02/2022    SED 20 05/26/2020       Anesthesia Plan    ASA Status:  2   NPO Status:  NPO Appropriate    Anesthesia Type: General.     - Airway: ETT   Induction: Intravenous.   Maintenance: Balanced.        Consents    Anesthesia Plan(s) and associated risks, benefits, and realistic alternatives discussed. Questions answered and patient/representative(s) expressed understanding.     - Discussed: Risks, Benefits and  Alternatives for BOTH SEDATION and the PROCEDURE were discussed     - Discussed with:  Patient      - Extended Intubation/Ventilatory Support Discussed: No.      - Patient is DNR/DNI Status: No    Use of blood products discussed: No .     Postoperative Care            Comments:    Other Comments: VASQUEZ Rosa 7/25/22            KATIE LIND CRNA

## 2022-08-03 DIAGNOSIS — E28.2 PCOS (POLYCYSTIC OVARIAN SYNDROME): ICD-10-CM

## 2022-08-03 DIAGNOSIS — E06.3 HYPOTHYROIDISM DUE TO HASHIMOTO'S THYROIDITIS: ICD-10-CM

## 2022-08-04 RX ORDER — METFORMIN HCL 500 MG
TABLET, EXTENDED RELEASE 24 HR ORAL
Qty: 180 TABLET | Refills: 3 | Status: SHIPPED | OUTPATIENT
Start: 2022-08-04 | End: 2023-03-09

## 2022-08-04 RX ORDER — THYROID 60 MG/1
TABLET ORAL
Qty: 90 TABLET | Refills: 2 | Status: SHIPPED | OUTPATIENT
Start: 2022-08-04 | End: 2023-03-09 | Stop reason: DRUGHIGH

## 2022-08-04 RX ORDER — THYROID 30 MG/1
TABLET ORAL
Qty: 90 TABLET | Refills: 2 | Status: SHIPPED | OUTPATIENT
Start: 2022-08-04 | End: 2023-03-09 | Stop reason: DRUGHIGH

## 2022-08-04 RX ORDER — LIOTHYRONINE SODIUM 5 UG/1
TABLET ORAL
Qty: 90 TABLET | Refills: 2 | Status: SHIPPED | OUTPATIENT
Start: 2022-08-04 | End: 2023-06-26

## 2022-08-04 NOTE — TELEPHONE ENCOUNTER
Metformin       Last Written Prescription Date:  7-14-22  Last Fill Quantity: 180,   # refills: 3  Last Office Visit: 7-14-22  Future Office visit:    Next 5 appointments (look out 90 days)    Oct 14, 2022  3:00 PM  (Arrive by 2:45 PM)  SHORT with Jovana Lambert MD  [facility] ([facility] ) [address]  [place] [address]  [phone]           Thyroid 60      Last Written Prescription Date:  11-24-21  Last Fill Quantity: 90,   # refills: 2    Cytomel       Last Written Prescription Date:  11-24-21  Last Fill Quantity: 90,   # refills: 2    Thyroid  30     Last Written Prescription Date:  11-24-21  Last Fill Quantity: 90,   # refills: 2

## 2022-08-09 ENCOUNTER — ANESTHESIA (OUTPATIENT)
Dept: SURGERY | Facility: HOSPITAL | Age: 32
End: 2022-08-09
Payer: COMMERCIAL

## 2022-08-09 ENCOUNTER — HOSPITAL ENCOUNTER (OUTPATIENT)
Facility: HOSPITAL | Age: 32
Discharge: HOME | End: 2022-08-09
Attending: SURGERY | Admitting: SURGERY
Payer: COMMERCIAL

## 2022-08-09 ENCOUNTER — APPOINTMENT (OUTPATIENT)
Dept: LAB | Facility: HOSPITAL | Age: 32
End: 2022-08-09
Attending: SURGERY
Payer: COMMERCIAL

## 2022-08-09 VITALS
HEIGHT: 65 IN | HEART RATE: 79 BPM | TEMPERATURE: 98 F | SYSTOLIC BLOOD PRESSURE: 104 MMHG | OXYGEN SATURATION: 95 % | BODY MASS INDEX: 32.99 KG/M2 | RESPIRATION RATE: 16 BRPM | DIASTOLIC BLOOD PRESSURE: 64 MMHG | WEIGHT: 198 LBS

## 2022-08-09 DIAGNOSIS — Z98.890 S/P HEMORRHOIDECTOMY: ICD-10-CM

## 2022-08-09 DIAGNOSIS — G89.18 ACUTE POST-OPERATIVE PAIN: Primary | ICD-10-CM

## 2022-08-09 DIAGNOSIS — Z87.19 S/P HEMORRHOIDECTOMY: ICD-10-CM

## 2022-08-09 LAB — HCG UR QL: NEGATIVE

## 2022-08-09 PROCEDURE — 88304 TISSUE EXAM BY PATHOLOGIST: CPT | Mod: TC | Performed by: SURGERY

## 2022-08-09 PROCEDURE — 46260 REMOVE IN/EX HEM GROUPS 2+: CPT | Performed by: NURSE ANESTHETIST, CERTIFIED REGISTERED

## 2022-08-09 PROCEDURE — 258N000003 HC RX IP 258 OP 636: Performed by: NURSE ANESTHETIST, CERTIFIED REGISTERED

## 2022-08-09 PROCEDURE — 250N000011 HC RX IP 250 OP 636: Performed by: NURSE ANESTHETIST, CERTIFIED REGISTERED

## 2022-08-09 PROCEDURE — 710N000010 HC RECOVERY PHASE 1, LEVEL 2, PER MIN: Performed by: SURGERY

## 2022-08-09 PROCEDURE — 370N000017 HC ANESTHESIA TECHNICAL FEE, PER MIN: Performed by: SURGERY

## 2022-08-09 PROCEDURE — 272N000001 HC OR GENERAL SUPPLY STERILE: Performed by: SURGERY

## 2022-08-09 PROCEDURE — 250N000013 HC RX MED GY IP 250 OP 250 PS 637: Performed by: NURSE ANESTHETIST, CERTIFIED REGISTERED

## 2022-08-09 PROCEDURE — 250N000011 HC RX IP 250 OP 636: Performed by: SURGERY

## 2022-08-09 PROCEDURE — 999N000141 HC STATISTIC PRE-PROCEDURE NURSING ASSESSMENT: Performed by: SURGERY

## 2022-08-09 PROCEDURE — 250N000025 HC SEVOFLURANE, PER MIN: Performed by: SURGERY

## 2022-08-09 PROCEDURE — 710N000012 HC RECOVERY PHASE 2, PER MINUTE: Performed by: SURGERY

## 2022-08-09 PROCEDURE — 360N000075 HC SURGERY LEVEL 2, PER MIN: Performed by: SURGERY

## 2022-08-09 PROCEDURE — 46260 REMOVE IN/EX HEM GROUPS 2+: CPT | Performed by: SURGERY

## 2022-08-09 PROCEDURE — 250N000009 HC RX 250: Performed by: NURSE ANESTHETIST, CERTIFIED REGISTERED

## 2022-08-09 PROCEDURE — 81025 URINE PREGNANCY TEST: CPT | Performed by: NURSE ANESTHETIST, CERTIFIED REGISTERED

## 2022-08-09 PROCEDURE — 88304 TISSUE EXAM BY PATHOLOGIST: CPT | Mod: 26 | Performed by: PATHOLOGY

## 2022-08-09 RX ORDER — NALOXONE HYDROCHLORIDE 0.4 MG/ML
0.4 INJECTION, SOLUTION INTRAMUSCULAR; INTRAVENOUS; SUBCUTANEOUS
Status: DISCONTINUED | OUTPATIENT
Start: 2022-08-09 | End: 2022-08-09 | Stop reason: HOSPADM

## 2022-08-09 RX ORDER — FENTANYL CITRATE 50 UG/ML
50 INJECTION, SOLUTION INTRAMUSCULAR; INTRAVENOUS
Status: DISCONTINUED | OUTPATIENT
Start: 2022-08-09 | End: 2022-08-09 | Stop reason: HOSPADM

## 2022-08-09 RX ORDER — MEPERIDINE HYDROCHLORIDE 25 MG/ML
12.5 INJECTION INTRAMUSCULAR; INTRAVENOUS; SUBCUTANEOUS
Status: DISCONTINUED | OUTPATIENT
Start: 2022-08-09 | End: 2022-08-09 | Stop reason: HOSPADM

## 2022-08-09 RX ORDER — POLYETHYLENE GLYCOL 3350, SODIUM SULFATE ANHYDROUS, SODIUM BICARBONATE, SODIUM CHLORIDE, POTASSIUM CHLORIDE 236; 22.74; 6.74; 5.86; 2.97 G/4L; G/4L; G/4L; G/4L; G/4L
1 POWDER, FOR SOLUTION ORAL
Qty: 4000 ML | Refills: 0 | Status: SHIPPED | OUTPATIENT
Start: 2022-08-09 | End: 2022-09-07

## 2022-08-09 RX ORDER — ONDANSETRON 4 MG/1
4 TABLET, ORALLY DISINTEGRATING ORAL
Status: CANCELLED | OUTPATIENT
Start: 2022-08-09

## 2022-08-09 RX ORDER — IBUPROFEN 800 MG/1
800 TABLET, FILM COATED ORAL 3 TIMES DAILY
Qty: 42 TABLET | Refills: 0 | Status: SHIPPED | OUTPATIENT
Start: 2022-08-09 | End: 2022-08-23

## 2022-08-09 RX ORDER — OXYCODONE HYDROCHLORIDE 5 MG/1
5-10 TABLET ORAL EVERY 4 HOURS PRN
Qty: 13 TABLET | Refills: 0 | Status: SHIPPED | OUTPATIENT
Start: 2022-08-09 | End: 2022-09-07

## 2022-08-09 RX ORDER — OXYCODONE HYDROCHLORIDE 5 MG/1
5 TABLET ORAL EVERY 4 HOURS PRN
Status: DISCONTINUED | OUTPATIENT
Start: 2022-08-09 | End: 2022-08-09 | Stop reason: HOSPADM

## 2022-08-09 RX ORDER — SODIUM CHLORIDE, SODIUM LACTATE, POTASSIUM CHLORIDE, CALCIUM CHLORIDE 600; 310; 30; 20 MG/100ML; MG/100ML; MG/100ML; MG/100ML
INJECTION, SOLUTION INTRAVENOUS CONTINUOUS
Status: DISCONTINUED | OUTPATIENT
Start: 2022-08-09 | End: 2022-08-09 | Stop reason: HOSPADM

## 2022-08-09 RX ORDER — PROPOFOL 10 MG/ML
INJECTION, EMULSION INTRAVENOUS PRN
Status: DISCONTINUED | OUTPATIENT
Start: 2022-08-09 | End: 2022-08-09

## 2022-08-09 RX ORDER — KETOROLAC TROMETHAMINE 30 MG/ML
INJECTION, SOLUTION INTRAMUSCULAR; INTRAVENOUS PRN
Status: DISCONTINUED | OUTPATIENT
Start: 2022-08-09 | End: 2022-08-09

## 2022-08-09 RX ORDER — FENTANYL CITRATE 50 UG/ML
INJECTION, SOLUTION INTRAMUSCULAR; INTRAVENOUS PRN
Status: DISCONTINUED | OUTPATIENT
Start: 2022-08-09 | End: 2022-08-09

## 2022-08-09 RX ORDER — HYDRALAZINE HYDROCHLORIDE 20 MG/ML
2.5-5 INJECTION INTRAMUSCULAR; INTRAVENOUS EVERY 10 MIN PRN
Status: DISCONTINUED | OUTPATIENT
Start: 2022-08-09 | End: 2022-08-09 | Stop reason: HOSPADM

## 2022-08-09 RX ORDER — NALOXONE HYDROCHLORIDE 0.4 MG/ML
0.2 INJECTION, SOLUTION INTRAMUSCULAR; INTRAVENOUS; SUBCUTANEOUS
Status: DISCONTINUED | OUTPATIENT
Start: 2022-08-09 | End: 2022-08-09 | Stop reason: HOSPADM

## 2022-08-09 RX ORDER — DEXAMETHASONE SODIUM PHOSPHATE 10 MG/ML
INJECTION, SOLUTION INTRAMUSCULAR; INTRAVENOUS PRN
Status: DISCONTINUED | OUTPATIENT
Start: 2022-08-09 | End: 2022-08-09

## 2022-08-09 RX ORDER — HYDROMORPHONE HYDROCHLORIDE 1 MG/ML
0.2 INJECTION, SOLUTION INTRAMUSCULAR; INTRAVENOUS; SUBCUTANEOUS EVERY 5 MIN PRN
Status: DISCONTINUED | OUTPATIENT
Start: 2022-08-09 | End: 2022-08-09 | Stop reason: HOSPADM

## 2022-08-09 RX ORDER — LIDOCAINE 40 MG/G
CREAM TOPICAL
Status: DISCONTINUED | OUTPATIENT
Start: 2022-08-09 | End: 2022-08-09 | Stop reason: HOSPADM

## 2022-08-09 RX ORDER — LIDOCAINE 50 MG/G
OINTMENT TOPICAL 2 TIMES DAILY PRN
Qty: 35 G | Refills: 0 | Status: SHIPPED | OUTPATIENT
Start: 2022-08-09 | End: 2022-09-07

## 2022-08-09 RX ORDER — CEFAZOLIN SODIUM/WATER 2 G/20 ML
2 SYRINGE (ML) INTRAVENOUS SEE ADMIN INSTRUCTIONS
Status: DISCONTINUED | OUTPATIENT
Start: 2022-08-09 | End: 2022-08-09 | Stop reason: HOSPADM

## 2022-08-09 RX ORDER — BUPIVACAINE HYDROCHLORIDE 2.5 MG/ML
INJECTION, SOLUTION EPIDURAL; INFILTRATION; INTRACAUDAL
Status: DISCONTINUED
Start: 2022-08-09 | End: 2022-08-09 | Stop reason: HOSPADM

## 2022-08-09 RX ORDER — ONDANSETRON 2 MG/ML
4 INJECTION INTRAMUSCULAR; INTRAVENOUS EVERY 30 MIN PRN
Status: DISCONTINUED | OUTPATIENT
Start: 2022-08-09 | End: 2022-08-09 | Stop reason: HOSPADM

## 2022-08-09 RX ORDER — FENTANYL CITRATE 50 UG/ML
50 INJECTION, SOLUTION INTRAMUSCULAR; INTRAVENOUS EVERY 5 MIN PRN
Status: DISCONTINUED | OUTPATIENT
Start: 2022-08-09 | End: 2022-08-09 | Stop reason: HOSPADM

## 2022-08-09 RX ORDER — LIDOCAINE HYDROCHLORIDE 20 MG/ML
INJECTION, SOLUTION INFILTRATION; PERINEURAL PRN
Status: DISCONTINUED | OUTPATIENT
Start: 2022-08-09 | End: 2022-08-09

## 2022-08-09 RX ORDER — ONDANSETRON 2 MG/ML
INJECTION INTRAMUSCULAR; INTRAVENOUS PRN
Status: DISCONTINUED | OUTPATIENT
Start: 2022-08-09 | End: 2022-08-09

## 2022-08-09 RX ORDER — METOPROLOL TARTRATE 1 MG/ML
1-2 INJECTION, SOLUTION INTRAVENOUS EVERY 5 MIN PRN
Status: DISCONTINUED | OUTPATIENT
Start: 2022-08-09 | End: 2022-08-09 | Stop reason: HOSPADM

## 2022-08-09 RX ORDER — CEFAZOLIN SODIUM/WATER 2 G/20 ML
2 SYRINGE (ML) INTRAVENOUS
Status: COMPLETED | OUTPATIENT
Start: 2022-08-09 | End: 2022-08-09

## 2022-08-09 RX ORDER — ONDANSETRON 4 MG/1
4 TABLET, ORALLY DISINTEGRATING ORAL EVERY 30 MIN PRN
Status: DISCONTINUED | OUTPATIENT
Start: 2022-08-09 | End: 2022-08-09 | Stop reason: HOSPADM

## 2022-08-09 RX ORDER — BUPIVACAINE HYDROCHLORIDE 2.5 MG/ML
INJECTION, SOLUTION INFILTRATION; PERINEURAL PRN
Status: DISCONTINUED | OUTPATIENT
Start: 2022-08-09 | End: 2022-08-09 | Stop reason: HOSPADM

## 2022-08-09 RX ADMIN — MIDAZOLAM 2 MG: 1 INJECTION INTRAMUSCULAR; INTRAVENOUS at 08:46

## 2022-08-09 RX ADMIN — KETOROLAC TROMETHAMINE 15 MG: 30 INJECTION, SOLUTION INTRAMUSCULAR at 09:50

## 2022-08-09 RX ADMIN — FENTANYL CITRATE 50 MCG: 50 INJECTION, SOLUTION INTRAMUSCULAR; INTRAVENOUS at 10:25

## 2022-08-09 RX ADMIN — SODIUM CHLORIDE, POTASSIUM CHLORIDE, SODIUM LACTATE AND CALCIUM CHLORIDE: 600; 310; 30; 20 INJECTION, SOLUTION INTRAVENOUS at 09:57

## 2022-08-09 RX ADMIN — LIDOCAINE HYDROCHLORIDE 80 MG: 20 INJECTION, SOLUTION INFILTRATION; PERINEURAL at 08:53

## 2022-08-09 RX ADMIN — FENTANYL CITRATE 50 MCG: 50 INJECTION, SOLUTION INTRAMUSCULAR; INTRAVENOUS at 10:12

## 2022-08-09 RX ADMIN — FENTANYL CITRATE 100 MCG: 50 INJECTION, SOLUTION INTRAMUSCULAR; INTRAVENOUS at 08:53

## 2022-08-09 RX ADMIN — ONDANSETRON 4 MG: 2 INJECTION INTRAMUSCULAR; INTRAVENOUS at 09:05

## 2022-08-09 RX ADMIN — HYDROMORPHONE HYDROCHLORIDE 0.2 MG: 1 INJECTION, SOLUTION INTRAMUSCULAR; INTRAVENOUS; SUBCUTANEOUS at 10:53

## 2022-08-09 RX ADMIN — Medication 100 MG: at 08:54

## 2022-08-09 RX ADMIN — DEXAMETHASONE SODIUM PHOSPHATE 10 MG: 10 INJECTION, SOLUTION INTRAMUSCULAR; INTRAVENOUS at 09:05

## 2022-08-09 RX ADMIN — Medication 2 G: at 08:24

## 2022-08-09 RX ADMIN — SODIUM CHLORIDE, POTASSIUM CHLORIDE, SODIUM LACTATE AND CALCIUM CHLORIDE: 600; 310; 30; 20 INJECTION, SOLUTION INTRAVENOUS at 08:24

## 2022-08-09 RX ADMIN — PROPOFOL 200 MG: 10 INJECTION, EMULSION INTRAVENOUS at 08:53

## 2022-08-09 RX ADMIN — OXYCODONE HYDROCHLORIDE 5 MG: 5 TABLET ORAL at 11:35

## 2022-08-09 ASSESSMENT — ACTIVITIES OF DAILY LIVING (ADL)
ADLS_ACUITY_SCORE: 35
ADLS_ACUITY_SCORE: 35

## 2022-08-09 NOTE — ANESTHESIA CARE TRANSFER NOTE
Patient: Meredith Gordon    Procedure: Procedure(s):  EXAM UNDER ANESTHESIA, RECTUM  Hemorrhoidectomy       Diagnosis: Hemorrhoids [K64.9]  Diagnosis Additional Information: No value filed.    Anesthesia Type:   General     Note:    Oropharynx: oropharynx clear of all foreign objects and spontaneously breathing  Level of Consciousness: drowsy  Oxygen Supplementation: nasal cannula  Level of Supplemental Oxygen (L/min / FiO2): 2  Independent Airway: airway patency satisfactory and stable  Dentition: dentition unchanged  Vital Signs Stable: post-procedure vital signs reviewed and stable  Report to RN Given: handoff report given  Patient transferred to: PACU    Handoff Report: Identifed the Patient, Identified the Reponsible Provider, Reviewed the pertinent medical history, Discussed the surgical course, Reviewed Intra-OP anesthesia mangement and issues during anesthesia, Set expectations for post-procedure period and Allowed opportunity for questions and acknowledgement of understanding      Vitals:  Vitals Value Taken Time   /77 08/09/22 1010   Temp     Pulse 103 08/09/22 1010   Resp 34 08/09/22 1010   SpO2 98 % 08/09/22 1010   Vitals shown include unvalidated device data.    Electronically Signed By: KATIE Langley CRNA  August 9, 2022  10:11 AM

## 2022-08-09 NOTE — OP NOTE
[facility]   Operative Note    Pre-operative diagnosis: Hemorrhoids [K64.9]   Post-operative diagnosis Same    Procedure: Procedure(s):  EXAM UNDER ANESTHESIA, RECTUM  Hemorrhoidectomy   Surgeon(s): Surgeon(s) and Role:     * Denilson Singh MD - Primary   Estimated blood loss: 15 mL    Specimens: ID Type Source Tests Collected by Time Destination   1 :  Tissue Hemorrhoids SURGICAL PATHOLOGY EXAM Denilson Singh MD 8/9/2022  9:13 AM       Findings: There were large external skin tags circumfrential, small internal hemorrhoid burden, 4 column hemorrhoidectomy performed.      Description of procedure:   After confirming consent in same day surgery the patient was brought to the operating room,General anesthesia was administered.  She was placed in the prone jackknife position. The anus was prepped and draped in sterile fashion. A timeout was performed.  We started by first injecting circumferential to the anus with 0.25% marcaine. Then taking some lube and a prat bivalve was noted to have enlarged columns of hemorrhoids as well as a large anterior external skin tag at the midline.  I did proceed with 4 column hemorrhoidectomy using a hand held ligasure and over sewing with 2-0 chromic suture. Hemostasis was ensured. The burden of her issues was mostly exterior, with large skin tags at the midline anterior and the right posterior. There was more internal disease noted on the left lateral both anterior and posterior locations.  She tolerated the procedure well with minimal blood loss and without apparent complication.       Deinlson Singh MD

## 2022-08-09 NOTE — OR NURSING
Patient and responsible adult given discharge instructions with no questions regarding instructions. Michael score 19/20. Pain level 3/10.  Discharged from unit via walking. Patient discharged to home with mother .

## 2022-08-09 NOTE — ANESTHESIA PROCEDURE NOTES
Airway       Patient location during procedure: OR       Procedure Start/Stop Times: 8/9/2022 8:53 AM and 8/9/2022 9:00 AM  Staff -        CRNA: Geoff Bermudez APRN CRNA       Performed By: CRNA  Consent for Airway        Urgency: elective  Indications and Patient Condition       Indications for airway management: tin-procedural       Induction type:intravenous       Mask difficulty assessment: 1 - vent by mask    Final Airway Details       Final airway type: endotracheal airway       Successful airway: ETT - single and Oral  Endotracheal Airway Details        ETT size (mm): 7.0       Cuffed: yes       Cuff volume (mL): 8       Successful intubation technique: direct laryngoscopy and video laryngoscopy       DL Blade Type: Moya 2       VL Blade Size: Glidescope 3       Grade View of Cords: 1       Adjucts: stylet       Position: Right       Measured from: gums/teeth       Secured at (cm): 22       Bite block used: None    Post intubation assessment        Placement verified by: capnometry, equal breath sounds and chest rise        Number of attempts at approach: 1       Number of other approaches attempted: 0       Secured with: plastic tape       Ease of procedure: easy       Dentition: Intact and Unchanged    Medication(s) Administered   Medication Administration Time: 8/9/2022 8:53 AM    Additional Comments       First attempt with Moya 2 yielded grade 3 view. Attempted intubation with limited visibility, unsuccessful. Masked between attempts, SPO2 and vital signs normal throughout. Second attempt with glidescope LoPro 3 blade, grade 1 view, easy intubation.

## 2022-08-09 NOTE — OR NURSING
Patient's mother Katya at the bedside. Given snacks and oral pain med. Hand off report to KYLER Yost.

## 2022-08-09 NOTE — DISCHARGE INSTRUCTIONS
Post-Anesthesia Patient Instructions    IMMEDIATELY FOLLOWING SURGERY:  Do not drive or operate machinery for the first twenty four hours after surgery.  Do not make any important decisions for twenty four hours after surgery or while taking narcotic pain medications or sedatives.  If you develop intractable nausea and vomiting or a severe headache please notify your doctor immediately.    FOLLOW-UP:  Please make an appointment with your surgeon as instructed. You do not need to follow up with anesthesia unless specifically instructed to do so.    WOUND CARE INSTRUCTIONS (if applicable):  Keep a dry clean dressing on the anesthesia/puncture wound site if there is drainage.  Once the wound has quit draining you may leave it open to air.  Generally you should leave the bandage intact for twenty four hours unless there is drainage.  If the epidural site drains for more than 36-48 hours please call the anesthesia department.          P

## 2022-08-09 NOTE — BRIEF OP NOTE
[facility]    Brief Operative Note    Pre-operative diagnosis: Hemorrhoids [K64.9]  Post-operative diagnosis Same as pre-operative diagnosis    Procedure: Procedure(s):  EXAM UNDER ANESTHESIA, RECTUM  Hemorrhoidectomy  Surgeon: Surgeon(s) and Role:     * Denilson Singh MD - Primary  Anesthesia: MAC with Local   Estimated Blood Loss: Less than 50 ml    Drains: None  Specimens:   ID Type Source Tests Collected by Time Destination   1 :  Tissue Hemorrhoids SURGICAL PATHOLOGY EXAM Denilson Singh MD 8/9/2022  9:13 AM      Findings:   Multiple large perianal skin tags, not large internal hemorrhoid burden.  Complications: None.  Implants: * No implants in log *

## 2022-08-09 NOTE — ANESTHESIA POSTPROCEDURE EVALUATION
Patient: Meredith Gordon    Procedure: Procedure(s):  EXAM UNDER ANESTHESIA, RECTUM  Hemorrhoidectomy       Anesthesia Type:  General    Note:  Disposition: Outpatient   Postop Pain Control: Uneventful            Sign Out: Well controlled pain   PONV: No   Neuro/Psych: Uneventful            Sign Out: Acceptable/Baseline neuro status   Airway/Respiratory: Uneventful            Sign Out: Acceptable/Baseline resp. status   CV/Hemodynamics: Uneventful            Sign Out: Acceptable CV status; No obvious hypovolemia; No obvious fluid overload   Other NRE: NONE   DID A NON-ROUTINE EVENT OCCUR? No           Last vitals:  Vitals Value Taken Time   /78 08/09/22 1115   Temp 97.6  F (36.4  C) 08/09/22 1045   Pulse 76 08/09/22 1117   Resp 37 08/09/22 1117   SpO2 95 % 08/09/22 1117   Vitals shown include unvalidated device data.    Electronically Signed By: KATIE Serrano CRNA  August 9, 2022  11:58 AM

## 2022-08-09 NOTE — INTERVAL H&P NOTE
"I have reviewed the surgical (or preoperative) H&P that is linked to this encounter, and examined the patient. There are no significant changes    Clinical Conditions Present on Arrival:  Clinically Significant Risk Factors Present on Admission                   # Obesity: Estimated body mass index is 32.95 kg/m  as calculated from the following:    Height as of this encounter: 1.651 m (5' 5\").    Weight as of this encounter: 89.8 kg (198 lb).       "

## 2022-08-10 LAB
PATH REPORT.COMMENTS IMP SPEC: NORMAL
PATH REPORT.FINAL DX SPEC: NORMAL
PATH REPORT.GROSS SPEC: NORMAL
PATH REPORT.MICROSCOPIC SPEC OTHER STN: NORMAL
PATH REPORT.RELEVANT HX SPEC: NORMAL
PHOTO IMAGE: NORMAL

## 2022-08-11 ENCOUNTER — TELEPHONE (OUTPATIENT)
Dept: SURGERY | Facility: OTHER | Age: 32
End: 2022-08-11

## 2022-08-11 DIAGNOSIS — K59.09 OTHER CONSTIPATION: Primary | ICD-10-CM

## 2022-08-11 NOTE — TELEPHONE ENCOUNTER
Patient elin regarding the procedure she had last week, the hemorrhoidectomy, discharge instructions state if no Bowel movement bring script of golytly to pharmacy and start drinking form the gallon.    She states she was not given this script and has not had a BM and is currently taking only one table of the oxycodone BID..    Doing well with the pain    Script pended but not sure of dirctions

## 2022-09-07 ENCOUNTER — OFFICE VISIT (OUTPATIENT)
Dept: SURGERY | Facility: OTHER | Age: 32
End: 2022-09-07
Attending: SURGERY
Payer: COMMERCIAL

## 2022-09-07 VITALS
SYSTOLIC BLOOD PRESSURE: 106 MMHG | HEIGHT: 65 IN | DIASTOLIC BLOOD PRESSURE: 72 MMHG | TEMPERATURE: 98.1 F | HEART RATE: 75 BPM | WEIGHT: 198 LBS | OXYGEN SATURATION: 98 % | BODY MASS INDEX: 32.99 KG/M2

## 2022-09-07 DIAGNOSIS — Z98.890 POSTOPERATIVE STATE: Primary | ICD-10-CM

## 2022-09-07 PROCEDURE — 99024 POSTOP FOLLOW-UP VISIT: CPT | Performed by: SURGERY

## 2022-09-07 ASSESSMENT — PAIN SCALES - GENERAL: PAINLEVEL: NO PAIN (0)

## 2022-09-07 NOTE — PATIENT INSTRUCTIONS
Thank you for allowing Dr. Singh and our surgical team to participate in your care. Please call our health unit coordinator at [phone] with scheduling questions or the nurse at [phone] with any other questions or concerns.

## 2022-09-07 NOTE — PROGRESS NOTES
"[facility] Progress Note    HPI: RTC for follow up from a total hemorrhoidectomy.       S: She is doing well, no pain no drainage, pleased with result     O:   Vitals:  /72   Pulse 75   Temp 98.1  F (36.7  C)   Ht 1.651 m (5' 5\")   Wt 89.8 kg (198 lb)   SpO2 98%   BMI 32.95 kg/m        Physical Exam:  G: alert oriented, no acute distress   ENT: sclera non-icteric   Pulm: no respiratory distress   CVS: RRR  ABD: perianal skin with no erythema, wounds well healed.   Ext: WWP     Assessment/Plan:  No concerns, follow up PRN     Denilson Singh MD     "

## 2022-09-07 NOTE — NURSING NOTE
"Chief Complaint   Patient presents with     Surgical Followup     8/9 hemorrhoidectomy        Initial /72   Pulse 75   Temp 98.1  F (36.7  C)   Ht 1.651 m (5' 5\")   Wt 89.8 kg (198 lb)   SpO2 98%   BMI 32.95 kg/m   Estimated body mass index is 32.95 kg/m  as calculated from the following:    Height as of this encounter: 1.651 m (5' 5\").    Weight as of this encounter: 89.8 kg (198 lb).  Medication Reconciliation: complete  Jenniffer Gil LPN  "

## 2022-12-09 ENCOUNTER — OFFICE VISIT (OUTPATIENT)
Dept: FAMILY MEDICINE | Facility: OTHER | Age: 32
End: 2022-12-09
Attending: FAMILY MEDICINE
Payer: COMMERCIAL

## 2022-12-09 VITALS
BODY MASS INDEX: 34.99 KG/M2 | WEIGHT: 210 LBS | DIASTOLIC BLOOD PRESSURE: 72 MMHG | HEIGHT: 65 IN | SYSTOLIC BLOOD PRESSURE: 112 MMHG | TEMPERATURE: 98.2 F | HEART RATE: 88 BPM | OXYGEN SATURATION: 98 %

## 2022-12-09 DIAGNOSIS — E55.9 HYPOVITAMINOSIS D: ICD-10-CM

## 2022-12-09 DIAGNOSIS — E28.2 PCOS (POLYCYSTIC OVARIAN SYNDROME): Primary | ICD-10-CM

## 2022-12-09 DIAGNOSIS — Z71.89 ACP (ADVANCE CARE PLANNING): ICD-10-CM

## 2022-12-09 DIAGNOSIS — E06.3 HYPOTHYROIDISM DUE TO HASHIMOTO'S THYROIDITIS: ICD-10-CM

## 2022-12-09 DIAGNOSIS — E53.8 VITAMIN B12 DEFICIENCY (NON ANEMIC): ICD-10-CM

## 2022-12-09 DIAGNOSIS — Z23 NEED FOR VACCINATION: ICD-10-CM

## 2022-12-09 LAB
ALBUMIN SERPL BCG-MCNC: 4.7 G/DL (ref 3.5–5.2)
ALP SERPL-CCNC: 70 U/L (ref 35–104)
ALT SERPL W P-5'-P-CCNC: 24 U/L (ref 10–35)
ANION GAP SERPL CALCULATED.3IONS-SCNC: 11 MMOL/L (ref 7–15)
AST SERPL W P-5'-P-CCNC: 20 U/L (ref 10–35)
BILIRUB SERPL-MCNC: 0.3 MG/DL
BUN SERPL-MCNC: 9.1 MG/DL (ref 6–20)
CALCIUM SERPL-MCNC: 9.7 MG/DL (ref 8.6–10)
CHLORIDE SERPL-SCNC: 100 MMOL/L (ref 98–107)
CREAT SERPL-MCNC: 0.75 MG/DL (ref 0.51–0.95)
DEPRECATED HCO3 PLAS-SCNC: 25 MMOL/L (ref 22–29)
GFR SERPL CREATININE-BSD FRML MDRD: >90 ML/MIN/1.73M2
GLUCOSE SERPL-MCNC: 92 MG/DL (ref 70–99)
POTASSIUM SERPL-SCNC: 3.7 MMOL/L (ref 3.4–5.3)
PROT SERPL-MCNC: 7.9 G/DL (ref 6.4–8.3)
SODIUM SERPL-SCNC: 136 MMOL/L (ref 136–145)
T4 FREE SERPL-MCNC: 0.63 NG/DL (ref 0.9–1.7)
TSH SERPL DL<=0.005 MIU/L-ACNC: 15.19 UIU/ML (ref 0.3–4.2)

## 2022-12-09 PROCEDURE — 90471 IMMUNIZATION ADMIN: CPT | Performed by: FAMILY MEDICINE

## 2022-12-09 PROCEDURE — 82607 VITAMIN B-12: CPT | Performed by: FAMILY MEDICINE

## 2022-12-09 PROCEDURE — 84439 ASSAY OF FREE THYROXINE: CPT | Performed by: FAMILY MEDICINE

## 2022-12-09 PROCEDURE — 36415 COLL VENOUS BLD VENIPUNCTURE: CPT | Performed by: FAMILY MEDICINE

## 2022-12-09 PROCEDURE — 84481 FREE ASSAY (FT-3): CPT | Performed by: FAMILY MEDICINE

## 2022-12-09 PROCEDURE — 80053 COMPREHEN METABOLIC PANEL: CPT | Performed by: FAMILY MEDICINE

## 2022-12-09 PROCEDURE — 82306 VITAMIN D 25 HYDROXY: CPT | Performed by: FAMILY MEDICINE

## 2022-12-09 PROCEDURE — 90472 IMMUNIZATION ADMIN EACH ADD: CPT | Performed by: FAMILY MEDICINE

## 2022-12-09 PROCEDURE — 84480 ASSAY TRIIODOTHYRONINE (T3): CPT | Performed by: FAMILY MEDICINE

## 2022-12-09 PROCEDURE — 84443 ASSAY THYROID STIM HORMONE: CPT | Performed by: FAMILY MEDICINE

## 2022-12-09 PROCEDURE — 90686 IIV4 VACC NO PRSV 0.5 ML IM: CPT | Performed by: FAMILY MEDICINE

## 2022-12-09 PROCEDURE — 90715 TDAP VACCINE 7 YRS/> IM: CPT | Performed by: FAMILY MEDICINE

## 2022-12-09 PROCEDURE — 99214 OFFICE O/P EST MOD 30 MIN: CPT | Mod: 25 | Performed by: FAMILY MEDICINE

## 2022-12-09 RX ORDER — THYROID 15 MG/1
15 TABLET ORAL DAILY
Qty: 30 TABLET | Refills: 1 | Status: SHIPPED | OUTPATIENT
Start: 2022-12-09 | End: 2023-02-09

## 2022-12-09 RX ORDER — THYROID 90 MG/1
TABLET ORAL
Qty: 30 TABLET | Refills: 1 | Status: SHIPPED | OUTPATIENT
Start: 2022-12-09 | End: 2023-02-09

## 2022-12-09 ASSESSMENT — ANXIETY QUESTIONNAIRES
1. FEELING NERVOUS, ANXIOUS, OR ON EDGE: SEVERAL DAYS
2. NOT BEING ABLE TO STOP OR CONTROL WORRYING: NOT AT ALL
GAD7 TOTAL SCORE: 2
7. FEELING AFRAID AS IF SOMETHING AWFUL MIGHT HAPPEN: NOT AT ALL
IF YOU CHECKED OFF ANY PROBLEMS ON THIS QUESTIONNAIRE, HOW DIFFICULT HAVE THESE PROBLEMS MADE IT FOR YOU TO DO YOUR WORK, TAKE CARE OF THINGS AT HOME, OR GET ALONG WITH OTHER PEOPLE: NOT DIFFICULT AT ALL
5. BEING SO RESTLESS THAT IT IS HARD TO SIT STILL: NOT AT ALL
6. BECOMING EASILY ANNOYED OR IRRITABLE: NOT AT ALL
GAD7 TOTAL SCORE: 2
3. WORRYING TOO MUCH ABOUT DIFFERENT THINGS: NOT AT ALL

## 2022-12-09 ASSESSMENT — PAIN SCALES - GENERAL: PAINLEVEL: NO PAIN (0)

## 2022-12-09 ASSESSMENT — PATIENT HEALTH QUESTIONNAIRE - PHQ9
5. POOR APPETITE OR OVEREATING: SEVERAL DAYS
SUM OF ALL RESPONSES TO PHQ QUESTIONS 1-9: 5

## 2022-12-09 NOTE — PROGRESS NOTES
Clinic Visit  Date of visit: 2022   Chief Complaint:   Chief Complaint   Patient presents with     PCOS       HPI:   Meredith Gordon is a 32 year old  female who presents to clinic today for follow up  for PCOS . [id number]}    Menarche: age 14  Menses: frequency: every 23-24 days and length: 5 days. Patient's last menstrual period was 2022 (approximate).   Family Planning Chart: No.  Acne: Yes: around period.  Hirsutism (facial hair): No.  Male-pattern hair loss: No.  Elevated serum androgen: No results found for: DHEA, TESTOSTTOTAL, FT, JOVITA]  BMI: Body mass index is 34.95 kg/m .   Type 2 DM: No.  Elevated Cholesterol: No.  Mood disorder: Yes: Anxiety, depression.    Ultrasound: None     Gynecologic History:  Last Pap:   Lab Results   Component Value Date    PAP NIL 2016      History of abnormal pap: No.    Obstetric History:   OB History    Para Term  AB Living   0 0 0 0 0 0   SAB IAB Ectopic Multiple Live Births   0 0 0 0 0     Obstetric history significant for:  n/a    Medications:  ARMOUR THYROID 30 MG tablet, Take 1 Tab by mouth one time a day.  liothyronine (CYTOMEL) 5 MCG tablet, Take 1 Tab by mouth one time a day.  metFORMIN (GLUCOPHAGE XR) 500 MG 24 hr tablet, Take 3 Tablets by mouth at bedtime.  sertraline (ZOLOFT) 100 MG tablet, Take 1 tablet (100 mg) by mouth daily  thyroid (ARMOUR THYROID) 60 MG tablet, Take 1 Tablet by mouth one time a day. In addition to the 30mg daily (Patient taking differently: 90 mg Take 1 Tablet by mouth one time a day. In addition to the 30mg daily)  triamcinolone (KENALOG) 0.1 % external cream, Apply topically 2 times daily  vitamin D3 (CHOLECALCIFEROL) 2000 units (50 mcg) tablet, Take 2 tablets (4,000 Units) by mouth daily    No current facility-administered medications on file prior to visit.      Allergy:  Allergies   Allergen Reactions     Cats Itching     Cat/feline product derivatives       Medical History:  Past Medical History:    Diagnosis Date     2019 novel coronavirus disease (COVID-19) 08/2021     B12 deficiency 06/25/2012    resolved     Hashimoto's disease 09/02/2011     Mild scoliosis 2022     Nephrolithiasis     2011 in [facility] ([facility])     PCOS (polycystic ovarian syndrome)      Recurrent major depressive disorder, in partial remission (H)        Surgical History:  Past Surgical History:   Procedure Laterality Date     EXAM UNDER ANESTHESIA RECTUM N/A 8/9/2022    Procedure: EXAM UNDER ANESTHESIA, RECTUM;  Surgeon: Denilson Singh MD;  [facility]: [facility]     HEMORRHOIDECTOMY EXTERNAL N/A 8/9/2022    Procedure: Hemorrhoidectomy;  Surgeon: Denilson Singh MD;  [facility]: [facility]     MAMMOPLASTY REDUCTION  2015     wisdom teeth         Social History:  Social History    Substance and Sexual Activity      Alcohol use: Yes        Comment: Occasional    History   Smoking Status     Never   Smokeless Tobacco     Never     History   Drug Use Unknown     History   Sexual Activity     Sexual activity: Never     Relationship status is: n/a.        Review of Systems:    GENERAL: No change in weight, sleep or appetite.  Normal energy.  No fever or chills  EYES: Negative for vision changes or eye problems  ENT: No problems with ears, nose or throat.  No difficulty swallowing.  RESP: No coughing, wheezing or shortness of breath  CV: No chest pains or palpitations  GI: No nausea, vomiting,  heartburn, abdominal pain, diarrhea, constipation or change in bowel habits  : No urinary frequency or dysuria, bladder or kidney problems  MUSCULOSKELETAL: No significant muscle or joint pains  NEUROLOGIC: No headaches, numbness, tingling, weakness, problems with balance or coordination  PSYCHIATRIC: No problems with anxiety, depression or mental health  HEME/IMMUNE/ALLERGY: No history of bleeding or clotting problems or anemia.  No allergies or immune system problems  ENDOCRINE: No history of thyroid disease, diabetes or other endocrine  "disorders  SKIN: No rashes,worrisome lesions or skin problems      Physical Exam:  Vitals:    22 1048   BP: 112/72   Pulse: 88   Temp: 98.2  F (36.8  C)   SpO2: 98%   Weight: 95.3 kg (210 lb)   Height: 1.651 m (5' 5\")     Body mass index is 34.95 kg/m .  GENERAL: healthy, alert and no distress  NECK: no adenopathy, no asymmetry, masses, or scars and thyroid normal to palpation  RESP: lungs clear to auscultation - no rales, rhonchi or wheezes  CV: regular rate and rhythm, normal S1 S2, no S3 or S4, no murmur, click or rub, no peripheral edema and peripheral pulses strong  MS: no gross musculoskeletal defects noted, no edema  PSYCH: mentation appears normal, affect normal/bright         Assessment/Plan:  Meredith Gordon is a 32 year old  who presents for follow up  for PCOS   (E28.2) PCOS (polycystic ovarian syndrome)  (primary encounter diagnosis)  Comment:   Plan: Comprehensive metabolic panel        Cycles are short, could be thryoid driven    (Z23) Need for vaccination  Comment:   Plan: TDAP VACCINE (Adacel, Boostrix), INFLUENZA         VACCINE IM > 6 MONTHS VALENT IIV4         (AFLURIA/FLUZONE)        due    (Z71.89) ACP (advance care planning)  Comment:   Plan: not discussed today    (E53.8) Vitamin B12 deficiency (non anemic)  Comment:   Plan: Vitamin B12        Labs pending    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 thyroiditis  Comment:   Plan: TSH with free T4 reflex, T3, Free, T3, total,         T4 free, thyroid (ARMOUR) 90 MG tablet, thyroid        (ARMOUR) 15 MG tablet, TSH with free T4 reflex        Increase meds and follow-up 2 mos    (E55.9) Hypovitaminosis D  Comment:   Plan: Vitamin D Deficiency        Labs pending    Weight gain -- discussed her sleep, adrenals, thryoid, trial ashwaghanda supplement  Follow-up 2 mos  Eat 3 meals per day          Jovana Lambert MD   "

## 2022-12-09 NOTE — PATIENT INSTRUCTIONS
Vitamin c 1000mg daily  1 bottle with 1/4  tsp cream of mirela, 1/2 tsp good salt  Ashwaghanda  1 tab/cap 2x/day for 6 months  3 meals per day for 4-6 weeks before resuming intermittant fasting  Digestive enzymes -- take 1 with each meal

## 2022-12-09 NOTE — LETTER
My Depression Action Plan  Name: Meredith Gordon   Date of Birth 1990  Date: 12/9/2022    My doctor: Jovana Lambert   My clinic: [facility] [place]  [address] [place] [address]  [phone]          GREEN    ZONE   Good Control    What it looks like:     Things are going generally well. You have normal ups and downs. You may even feel depressed from time to time, but bad moods usually last less than a day.   What you need to do:  1. Continue to care for yourself (see self care plan)  2. Check your depression survival kit and update it as needed  3. Follow your physician s recommendations including any medication.  4. Do not stop taking medication unless you consult with your physician first.           YELLOW         ZONE Getting Worse    What it looks like:     Depression is starting to interfere with your life.     It may be hard to get out of bed; you may be starting to isolate yourself from others.    Symptoms of depression are starting to last most all day and this has happened for several days.     You may have suicidal thoughts but they are not constant.   What you need to do:     1. Call your care team. Your response to treatment will improve if you keep your care team informed of your progress. Yellow periods are signs an adjustment may need to be made.     2. Continue your self-care.  Just get dressed and ready for the day.  Don't give yourself time to talk yourself out of it.    3. Talk to someone in your support network.    4. Open up your Depression Self-Care Plan/Wellness Kit.           RED    ZONE Medical Alert - Get Help    What it looks like:     Depression is seriously interfering with your life.     You may experience these or other symptoms: You can t get out of bed most days, can t work or engage in other necessary activities, you have trouble taking care of basic hygiene, or basic responsibilities, thoughts of suicide or death that will not go away,  self-injurious behavior.     What you need to do:  1. Call your care team and request a same-day appointment. If they are not available (weekends or after hours) call your local crisis line, emergency room or 911.          Depression Self-Care Plan / Wellness Kit    Many people find that medication and therapy are helpful treatments for managing depression. In addition, making small changes to your everyday life can help to boost your mood and improve your wellbeing. Below are some tips for you to consider. Be sure to talk with your medical provider and/or behavioral health consultant if your symptoms are worsening or not improving.     Sleep   Sleep hygiene  means all of the habits that support good, restful sleep. It includes maintaining a consistent bedtime and wake time, using your bedroom only for sleeping or sex, and keeping the bedroom dark and free of distractions like a computer, smartphone, or television.     Develop a Healthy Routine  Maintain good hygiene. Get out of bed in the morning, make your bed, brush your teeth, take a shower, and get dressed. Don t spend too much time viewing media that makes you feel stressed. Find time to relax each day.    Exercise  Get some form of exercise every day. This will help reduce pain and release endorphins, the  feel good  chemicals in your brain. It can be as simple as just going for a walk or doing some gardening, anything that will get you moving.      Diet  Strive to eat healthy foods, including fruits and vegetables. Drink plenty of water. Avoid excessive sugar, caffeine, alcohol, and other mood-altering substances.     Stay Connected with Others  Stay in touch with friends and family members.    Manage Your Mood  Try deep breathing, massage therapy, biofeedback, or meditation. Take part in fun activities when you can. Try to find something to smile about each day.     Psychotherapy  Be open to working with a therapist if your provider recommends it.      Medication  Be sure to take your medication as prescribed. Most anti-depressants need to be taken every day. It usually takes several weeks for medications to work. Not all medicines work for all people. It is important to follow-up with your provider to make sure you have a treatment plan that is working for you. Do not stop your medication abruptly without first discussing it with your provider.    Crisis Resources   These hotlines are for both adults and children. They and are open 24 hours a day, 7 days a week unless noted otherwise.      National Suicide Prevention Lifeline   988 or 6-956-269-ZZWP (4283)      Crisis Text Line    www.crisistextline.org  Text HOME to 423584 from anywhere in the [place], anytime, about any type of crisis. A live, trained crisis counselor will receive the text and respond quickly.      [facility] Lifeline for LGBTQ Youth  A national crisis intervention and suicide lifeline for LGBTQ youth under 25. Provides a safe place to talk without judgement. Call 1-328.298.6430; text START to 103007 or visit www.thetrevorproject.org to talk to a trained counselor.      For [facility] crisis numbers, visit the [facility] website at:  https://[place].gov/dhs/people-we-serve/adults/health-care/mental-health/resources/crisis-contacts.jsp

## 2022-12-10 PROBLEM — E55.9 HYPOVITAMINOSIS D: Status: ACTIVE | Noted: 2022-12-10

## 2022-12-10 LAB
T3 SERPL-MCNC: 184 NG/DL (ref 85–202)
T3FREE SERPL-MCNC: 4.4 PG/ML (ref 2–4.4)
VIT B12 SERPL-MCNC: 2446 PG/ML (ref 232–1245)

## 2022-12-11 LAB — DEPRECATED CALCIDIOL+CALCIFEROL SERPL-MC: 99 UG/L (ref 20–75)

## 2023-02-13 ENCOUNTER — LAB (OUTPATIENT)
Dept: LAB | Facility: OTHER | Age: 33
End: 2023-02-13
Payer: COMMERCIAL

## 2023-02-13 DIAGNOSIS — E06.3 HYPOTHYROIDISM DUE TO HASHIMOTO'S THYROIDITIS: ICD-10-CM

## 2023-02-13 DIAGNOSIS — E06.3 HYPOTHYROIDISM DUE TO HASHIMOTO'S THYROIDITIS: Primary | ICD-10-CM

## 2023-02-13 LAB
T4 FREE SERPL-MCNC: 0.54 NG/DL (ref 0.9–1.7)
TSH SERPL DL<=0.005 MIU/L-ACNC: 12.82 UIU/ML (ref 0.3–4.2)

## 2023-02-13 PROCEDURE — 84443 ASSAY THYROID STIM HORMONE: CPT

## 2023-02-13 PROCEDURE — 36415 COLL VENOUS BLD VENIPUNCTURE: CPT

## 2023-02-13 PROCEDURE — 84439 ASSAY OF FREE THYROXINE: CPT

## 2023-02-22 NOTE — NURSING NOTE
"Chief Complaint   Patient presents with     RECHECK     discuss ultrasound results       Initial /64   Pulse 95   Temp 97.6  F (36.4  C)   Ht 1.651 m (5' 5\")   Wt 98 kg (216 lb)   SpO2 99%   BMI 35.94 kg/m   Estimated body mass index is 35.94 kg/m  as calculated from the following:    Height as of this encounter: 1.651 m (5' 5\").    Weight as of this encounter: 98 kg (216 lb).  Medication Reconciliation: complete    " Lower Range (In Mg/Kg): 120

## 2023-03-08 NOTE — PROGRESS NOTES
Clinic Visit  Date of visit: 3/8/2023   Chief Complaint:   Chief Complaint   Patient presents with     pcos       HPI:   Meredith Gordon is a 33 year old  female who presents to clinic today for follow up  for PCOS .     Menarche: age 14  Menses: frequency: every 28 days. No LMP recorded. (Menstrual status: Irregular Periods).   Family Planning Chart: No.  Acne: Yes: around period.  Hirsutism (facial hair): No.  Male-pattern hair loss: No.  Elevated serum androgen: No results found for: DHEA, TESTOSTTOTAL, FT, JOVITA]  BMI: Body mass index is 34.61 kg/m .   Type 2 DM: No.  Elevated Cholesterol: No.  Mood disorder: Yes: depression and anxiety.    Ultrasound: in the past     Gynecologic History:  Last Pap:   Lab Results   Component Value Date    PAP NIL 2016      History of abnormal pap: No.    Obstetric History:   OB History    Para Term  AB Living   0 0 0 0 0 0   SAB IAB Ectopic Multiple Live Births   0 0 0 0 0     Obstetric history significant for:  n/a    Medications:  liothyronine (CYTOMEL) 5 MCG tablet, Take 1 Tab by mouth one time a day.  metFORMIN (GLUCOPHAGE XR) 500 MG 24 hr tablet, Take 3 Tablets by mouth at bedtime.  sertraline (ZOLOFT) 100 MG tablet, Take 1 Tablet by mouth one time a day.  thyroid (ARMOUR) 180 MG tablet, Take 1 tablet (180 mg) by mouth daily  triamcinolone (KENALOG) 0.1 % external cream, Apply topically 2 times daily  vitamin D3 (CHOLECALCIFEROL) 2000 units (50 mcg) tablet, Take 2 tablets (4,000 Units) by mouth daily  ARMOUR THYROID 15 MG tablet, Take 1 tablet (15 mg) by mouth daily In addition to the 90 mg tablet daily for a total of 105mg daily  ARMOUR THYROID 30 MG tablet, Take 1 Tab by mouth one time a day.  thyroid (ARMOUR THYROID) 60 MG tablet, Take 1 Tablet by mouth one time a day. In addition to the 30mg daily (Patient taking differently: 90 mg Take 1 Tablet by mouth one time a day. In addition to the 30mg daily)  thyroid (ARMOUR THYROID) 90 MG tablet,  Take 90 mg tablet daily in addition to the 15mg for a total of 105mg daily    No current facility-administered medications on file prior to visit.      Allergy:  Allergies   Allergen Reactions     Cats Itching     Cat/feline product derivatives       Medical History:  Past Medical History:   Diagnosis Date     2019 novel coronavirus disease (COVID-19) 08/2021     B12 deficiency 06/25/2012    resolved     Hashimoto's disease 09/02/2011     Mild scoliosis 2022     Nephrolithiasis     2011 in [facility] (at [facility])     PCOS (polycystic ovarian syndrome)      Recurrent major depressive disorder, in partial remission (H)        Surgical History:  Past Surgical History:   Procedure Laterality Date     EXAM UNDER ANESTHESIA RECTUM N/A 8/9/2022    Procedure: EXAM UNDER ANESTHESIA, RECTUM;  Surgeon: Denilson Singh MD;  Location: [facility]     HEMORRHOIDECTOMY EXTERNAL N/A 8/9/2022    Procedure: Hemorrhoidectomy;  Surgeon: Denilson Singh MD;  [facility]: [facility]     MAMMOPLASTY REDUCTION  2015     wisdom teeth         Social History:  Social History    Substance and Sexual Activity      Alcohol use: Yes        Comment: Occasional    History   Smoking Status     Never   Smokeless Tobacco     Never     History   Drug Use Unknown     History   Sexual Activity     Sexual activity: Never     Relationship status is: Never been .        Review of Systems:    General: POSITIVE for:, weight gain, or difficulty losing weight  EYES: Negative for vision changes or eye problems  ENT: No problems with ears, nose or throat.  No difficulty swallowing.  RESP: No coughing, wheezing or shortness of breath  CV: No chest pains or palpitations  GI: No nausea, vomiting,  heartburn, abdominal pain, diarrhea, constipation or change in bowel habits  : No urinary frequency or dysuria, bladder or kidney problems  MUSCULOSKELETAL: No significant muscle or joint pains  NEUROLOGIC: No headaches, numbness, tingling, weakness, problems with balance  or coordination  PSYCHIATRIC: No problems with anxiety, depression or mental health  HEME/IMMUNE/ALLERGY: No history of bleeding or clotting problems or anemia.  No allergies or immune system problems  ENDOCRINE: No history of thyroid disease, diabetes or other endocrine disorders  SKIN: No rashes,worrisome lesions or skin problems      Physical Exam:  Vitals:    23 0808   BP: 102/70   BP Location: Right arm   Patient Position: Sitting   Cuff Size: Adult Regular   Pulse: 81   Resp: 18   Temp: 96.8  F (36  C)   TempSrc: Tympanic   SpO2: 97%   Weight: 94.3 kg (208 lb)     Body mass index is 34.61 kg/m .  GENERAL: healthy, alert and no distress  RESP: lungs clear to auscultation - no rales, rhonchi or wheezes  CV: regular rate and rhythm, normal S1 S2, no S3 or S4, no murmur, click or rub, no peripheral edema and peripheral pulses strong  ABDOMEN: soft, nontender, no hepatosplenomegaly, no masses and bowel sounds normal  MS: no gross musculoskeletal defects noted, no edema  PSYCH: mentation appears normal, affect normal/bright         Assessment/Plan:  Meredith Gordon is a 33 year old  who presents for follow up  for PCOS   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 thyroiditis  (primary encounter diagnosis)  Comment:   Plan: due for repeat labs in April -- still having trouble losing weight    (E28.2) PCOS (polycystic ovarian syndrome)  Comment:   Plan: metFORMIN (GLUCOPHAGE XR) 500 MG 24 hr tablet,         Glucose, Insulin level, Glucose tolerance, std         non preg, Estradiol, Progesterone, Luteinizing         Hormone, Follicle stimulating hormone,         Testosterone Free and Total, DHEA sulfate,         Androstenedione, Prolactin, 17 OH progesterone,        Mullerian Hormone Antibody        Recheck labs this cycle, follow-up 2 weeks after labs done   and CPE this summer          Jovana Lambert MD

## 2023-03-09 ENCOUNTER — OFFICE VISIT (OUTPATIENT)
Dept: FAMILY MEDICINE | Facility: OTHER | Age: 33
End: 2023-03-09
Attending: FAMILY MEDICINE
Payer: COMMERCIAL

## 2023-03-09 VITALS
OXYGEN SATURATION: 97 % | WEIGHT: 208 LBS | HEART RATE: 81 BPM | RESPIRATION RATE: 18 BRPM | SYSTOLIC BLOOD PRESSURE: 102 MMHG | DIASTOLIC BLOOD PRESSURE: 70 MMHG | TEMPERATURE: 96.8 F | BODY MASS INDEX: 34.61 KG/M2

## 2023-03-09 DIAGNOSIS — E28.2 PCOS (POLYCYSTIC OVARIAN SYNDROME): ICD-10-CM

## 2023-03-09 DIAGNOSIS — E06.3 HYPOTHYROIDISM DUE TO HASHIMOTO'S THYROIDITIS: Primary | ICD-10-CM

## 2023-03-09 PROCEDURE — 99214 OFFICE O/P EST MOD 30 MIN: CPT | Performed by: FAMILY MEDICINE

## 2023-03-09 RX ORDER — METFORMIN HCL 500 MG
1000 TABLET, EXTENDED RELEASE 24 HR ORAL AT BEDTIME
Qty: 180 TABLET | Refills: 3 | COMMUNITY
Start: 2023-03-09 | End: 2023-04-12

## 2023-03-09 ASSESSMENT — PAIN SCALES - GENERAL: PAINLEVEL: NO PAIN (0)

## 2023-03-09 NOTE — PATIENT INSTRUCTIONS
Eat more greens/brassica veggies -- broccoli, brussel sprouts, etc to help bind estrogen in the gut  Increase BMs to 2x/day  DIM detox supplement -- start with 1 daily  4.  Targeted hormone profile  
yes

## 2023-03-19 ENCOUNTER — LAB (OUTPATIENT)
Dept: LAB | Facility: HOSPITAL | Age: 33
End: 2023-03-19
Payer: COMMERCIAL

## 2023-03-19 DIAGNOSIS — E28.2 PCOS (POLYCYSTIC OVARIAN SYNDROME): ICD-10-CM

## 2023-03-19 LAB
ESTRADIOL SERPL-MCNC: 206 PG/ML
FASTING STATUS PATIENT QL REPORTED: NO
GLUCOSE SERPL-MCNC: 138 MG/DL (ref 70–99)
GLUCOSE SERPL-MCNC: 51 MG/DL (ref 70–99)
GLUCOSE SERPL-MCNC: 72 MG/DL (ref 70–99)
INSULIN SERPL-ACNC: 15.4 UU/ML (ref 2.6–24.9)
INSULIN SERPL-ACNC: 18.7 UU/ML (ref 2.6–24.9)
INSULIN SERPL-ACNC: 25.4 UU/ML (ref 2.6–24.9)
INSULIN SERPL-ACNC: 262 UU/ML (ref 2.6–24.9)
INSULIN SERPL-ACNC: 60.5 UU/ML (ref 2.6–24.9)
NON GESTATIONAL GTT 2 HR POST DOSE: 109 MG/DL (ref 60–199)
NON GESTATIONAL GTT FASTING: 97 MG/DL (ref 60–125)
PROGEST SERPL-MCNC: 11.3 NG/ML

## 2023-03-19 PROCEDURE — 83525 ASSAY OF INSULIN: CPT | Mod: 59

## 2023-03-19 PROCEDURE — 84144 ASSAY OF PROGESTERONE: CPT

## 2023-03-19 PROCEDURE — 82947 ASSAY GLUCOSE BLOOD QUANT: CPT

## 2023-03-19 PROCEDURE — 82670 ASSAY OF TOTAL ESTRADIOL: CPT

## 2023-03-19 PROCEDURE — 82950 GLUCOSE TEST: CPT

## 2023-03-19 PROCEDURE — 36415 COLL VENOUS BLD VENIPUNCTURE: CPT

## 2023-03-21 ENCOUNTER — LAB (OUTPATIENT)
Dept: LAB | Facility: OTHER | Age: 33
End: 2023-03-21
Payer: COMMERCIAL

## 2023-03-21 DIAGNOSIS — E28.2 PCOS (POLYCYSTIC OVARIAN SYNDROME): ICD-10-CM

## 2023-03-21 LAB
ESTRADIOL SERPL-MCNC: 196 PG/ML
PROGEST SERPL-MCNC: 9.8 NG/ML

## 2023-03-21 PROCEDURE — 82670 ASSAY OF TOTAL ESTRADIOL: CPT

## 2023-03-21 PROCEDURE — 36415 COLL VENOUS BLD VENIPUNCTURE: CPT

## 2023-03-21 PROCEDURE — 84144 ASSAY OF PROGESTERONE: CPT

## 2023-03-23 ENCOUNTER — LAB (OUTPATIENT)
Dept: LAB | Facility: OTHER | Age: 33
End: 2023-03-23
Payer: COMMERCIAL

## 2023-03-23 DIAGNOSIS — E28.2 PCOS (POLYCYSTIC OVARIAN SYNDROME): ICD-10-CM

## 2023-03-23 LAB
ESTRADIOL SERPL-MCNC: 181 PG/ML
FSH SERPL IRP2-ACNC: 2.2 MIU/ML
LH SERPL-ACNC: 2.1 MIU/ML
PROGEST SERPL-MCNC: 9 NG/ML
PROLACTIN SERPL 3RD IS-MCNC: 25 NG/ML (ref 5–23)

## 2023-03-23 PROCEDURE — 84146 ASSAY OF PROLACTIN: CPT

## 2023-03-23 PROCEDURE — 83002 ASSAY OF GONADOTROPIN (LH): CPT

## 2023-03-23 PROCEDURE — 82157 ASSAY OF ANDROSTENEDIONE: CPT | Mod: 90

## 2023-03-23 PROCEDURE — 84144 ASSAY OF PROGESTERONE: CPT

## 2023-03-23 PROCEDURE — 36415 COLL VENOUS BLD VENIPUNCTURE: CPT

## 2023-03-23 PROCEDURE — 84403 ASSAY OF TOTAL TESTOSTERONE: CPT

## 2023-03-23 PROCEDURE — 83001 ASSAY OF GONADOTROPIN (FSH): CPT

## 2023-03-23 PROCEDURE — 82627 DEHYDROEPIANDROSTERONE: CPT

## 2023-03-23 PROCEDURE — 82670 ASSAY OF TOTAL ESTRADIOL: CPT

## 2023-03-23 PROCEDURE — 84270 ASSAY OF SEX HORMONE GLOBUL: CPT

## 2023-03-24 LAB
DHEA-S SERPL-MCNC: 283 UG/DL (ref 35–430)
SHBG SERPL-SCNC: 57 NMOL/L (ref 30–135)

## 2023-03-25 ENCOUNTER — LAB (OUTPATIENT)
Dept: LAB | Facility: HOSPITAL | Age: 33
End: 2023-03-25
Payer: COMMERCIAL

## 2023-03-25 ENCOUNTER — HOSPITAL ENCOUNTER (EMERGENCY)
Facility: HOSPITAL | Age: 33
End: 2023-03-25
Payer: COMMERCIAL

## 2023-03-25 DIAGNOSIS — E28.2 PCOS (POLYCYSTIC OVARIAN SYNDROME): ICD-10-CM

## 2023-03-25 LAB
ESTRADIOL SERPL-MCNC: 57 PG/ML
PROGEST SERPL-MCNC: 1.3 NG/ML
TESTOST FREE SERPL-MCNC: 0.31 NG/DL
TESTOST SERPL-MCNC: 25 NG/DL (ref 8–60)

## 2023-03-25 PROCEDURE — 82670 ASSAY OF TOTAL ESTRADIOL: CPT

## 2023-03-25 PROCEDURE — 84144 ASSAY OF PROGESTERONE: CPT

## 2023-03-25 PROCEDURE — 36415 COLL VENOUS BLD VENIPUNCTURE: CPT

## 2023-03-28 LAB — ANDROST SERPL-MCNC: 1.9 NG/ML

## 2023-04-10 NOTE — PROGRESS NOTES
Clinic Visit  Date of visit: 4/10/2023   Chief Complaint:   Chief Complaint   Patient presents with     PCOS       HPI:   Meredith Gordon is a 33 year old  female who presents to clinic today for follow up  for PCOS .     Menarche: age 14  Menses: frequency: every 24 days. Patient's last menstrual period was 2023 (exact date).   Family Planning Chart: No.  Acne: Yes: during menses.  Hirsutism (facial hair): No.  Male-pattern hair loss: No.  Elevated serum androgen:   Lab Results   Component Value Date    DHEA 283 2023    TESTOSTTOTAL 25 2023    FT 0.31 2023    JOVITA 1.900 2023   ]  BMI: Body mass index is 34.78 kg/m .   Type 2 DM: No.  Elevated Cholesterol: No.  Mood disorder: Yes: Depression and Anxiety.    Ultrasound: In the past, not in chart     Gynecologic History:  Last Pap:   Lab Results   Component Value Date    PAP NIL 2016      History of abnormal pap: No.    Obstetric History:   OB History    Para Term  AB Living   0 0 0 0 0 0   SAB IAB Ectopic Multiple Live Births   0 0 0 0 0     Obstetric history significant for:  N/A    Medications:  Acetylcysteine, Nutrient, 600 MG TABS, Take 600 mg by mouth 2 times daily  liothyronine (CYTOMEL) 5 MCG tablet, Take 1 Tab by mouth one time a day.  metFORMIN (GLUCOPHAGE XR) 500 MG 24 hr tablet, Take 2 tablets (1,000 mg) by mouth At Bedtime  sertraline (ZOLOFT) 100 MG tablet, Take 1 Tablet by mouth one time a day.  thyroid (ARMOUR) 180 MG tablet, Take 1 tablet (180 mg) by mouth daily  triamcinolone (KENALOG) 0.1 % external cream, Apply topically 2 times daily  vitamin D3 (CHOLECALCIFEROL) 2000 units (50 mcg) tablet, Take 2 tablets (4,000 Units) by mouth daily    No current facility-administered medications on file prior to visit.      Allergy:  Allergies   Allergen Reactions     Cats Itching     Cat/feline product derivatives       Medical History:  Past Medical History:   Diagnosis Date      novel coronavirus  disease (COVID-19) 08/2021     B12 deficiency 06/25/2012    resolved     Hashimoto's disease 09/02/2011     Mild scoliosis 2022     Nephrolithiasis     2011 in [facility] (at [facility])     PCOS (polycystic ovarian syndrome)      Recurrent major depressive disorder, in partial remission (H)        Surgical History:  Past Surgical History:   Procedure Laterality Date     EXAM UNDER ANESTHESIA RECTUM N/A 8/9/2022    Procedure: EXAM UNDER ANESTHESIA, RECTUM;  Surgeon: Denilson Singh MD;  [facility]: [facility]     HEMORRHOIDECTOMY EXTERNAL N/A 8/9/2022    Procedure: Hemorrhoidectomy;  Surgeon: Denilson Singh MD;  [facility]: [facility]     MAMMOPLASTY REDUCTION  2015     wisdom teeth         Social History:  Social History    Substance and Sexual Activity      Alcohol use: Yes        Comment: Occasional    History   Smoking Status     Never   Smokeless Tobacco     Never     History   Drug Use Unknown     History   Sexual Activity     Sexual activity: Never     Relationship status is: Never been .        Review of Systems:    GENERAL: No change in weight, sleep or appetite.  Normal energy.  No fever or chills  EYES: Negative for vision changes or eye problems  ENT: No problems with ears, nose or throat.  No difficulty swallowing.  RESP: No coughing, wheezing or shortness of breath  CV: No chest pains or palpitations  GI: No nausea, vomiting,  heartburn, abdominal pain, diarrhea, constipation or change in bowel habits  : No urinary frequency or dysuria, bladder or kidney problems  MUSCULOSKELETAL: No significant muscle or joint pains  NEUROLOGIC: No headaches, numbness, tingling, weakness, problems with balance or coordination  PSYCHIATRIC: No problems with anxiety, depression or mental health  HEME/IMMUNE/ALLERGY: No history of bleeding or clotting problems or anemia.  No allergies or immune system problems  ENDOCRINE: No history of thyroid disease, diabetes or other endocrine disorders  SKIN: No rashes,worrisome  "lesions or skin problems      Physical Exam:  Vitals:    23 0858   BP: 124/78   BP Location: Left arm   Patient Position: Chair   Pulse: 95   Resp: 16   Temp: 97.9  F (36.6  C)   TempSrc: Tympanic   SpO2: 97%   Weight: 94.8 kg (209 lb)   Height: 1.651 m (5' 5\")     Body mass index is 34.78 kg/m .  GENERAL: healthy, alert and no distress  RESP: lungs clear to auscultation - no rales, rhonchi or wheezes  CV: regular rate and rhythm, normal S1 S2, no S3 or S4, no murmur, click or rub, no peripheral edema and peripheral pulses strong  PSYCH: mentation appears normal, affect normal/bright         Assessment/Plan:  Meredith Gordon is a 33 year old  who presents for follow up  for PCOS   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 thyroiditis  (primary encounter diagnosis)  Comment:   Plan: T4 free        t4 still low    (E28.2) PCOS (polycystic ovarian syndrome)  Comment:   Plan: metFORMIN (GLUCOPHAGE XR) 500 MG 24 hr tablet        Increase to 4 tabs daily if tolerated    (R79.89) Elevated prolactin level  Comment:   Plan: Prolactin        Pending results today, will start cabergoline    (E88.81) Insulin resistance  Comment:   Plan: evident on sugar test, if doesn't tolerate metformin, will consider other options        Jovana Lambert MD   "

## 2023-04-12 ENCOUNTER — OFFICE VISIT (OUTPATIENT)
Dept: FAMILY MEDICINE | Facility: OTHER | Age: 33
End: 2023-04-12
Attending: FAMILY MEDICINE
Payer: COMMERCIAL

## 2023-04-12 VITALS
HEART RATE: 95 BPM | HEIGHT: 65 IN | OXYGEN SATURATION: 97 % | TEMPERATURE: 97.9 F | BODY MASS INDEX: 34.82 KG/M2 | WEIGHT: 209 LBS | DIASTOLIC BLOOD PRESSURE: 78 MMHG | RESPIRATION RATE: 16 BRPM | SYSTOLIC BLOOD PRESSURE: 124 MMHG

## 2023-04-12 DIAGNOSIS — R79.89 ELEVATED PROLACTIN LEVEL: ICD-10-CM

## 2023-04-12 DIAGNOSIS — E88.819 INSULIN RESISTANCE: ICD-10-CM

## 2023-04-12 DIAGNOSIS — E28.2 PCOS (POLYCYSTIC OVARIAN SYNDROME): ICD-10-CM

## 2023-04-12 DIAGNOSIS — E06.3 HYPOTHYROIDISM DUE TO HASHIMOTO'S THYROIDITIS: Primary | ICD-10-CM

## 2023-04-12 LAB
MIS SERPL-MCNC: 3.88 NG/ML (ref 0.58–8.1)
PROGEST SERPL-MCNC: 8 NG/ML
PROLACTIN SERPL 3RD IS-MCNC: 16 NG/ML (ref 5–23)
T4 FREE SERPL-MCNC: 0.83 NG/DL (ref 0.9–1.7)
TSH SERPL DL<=0.005 MIU/L-ACNC: 0.03 UIU/ML (ref 0.3–4.2)

## 2023-04-12 PROCEDURE — 84439 ASSAY OF FREE THYROXINE: CPT | Performed by: FAMILY MEDICINE

## 2023-04-12 PROCEDURE — 99214 OFFICE O/P EST MOD 30 MIN: CPT | Performed by: FAMILY MEDICINE

## 2023-04-12 PROCEDURE — 84443 ASSAY THYROID STIM HORMONE: CPT | Performed by: FAMILY MEDICINE

## 2023-04-12 PROCEDURE — 83498 ASY HYDROXYPROGESTERONE 17-D: CPT | Performed by: FAMILY MEDICINE

## 2023-04-12 PROCEDURE — 36415 COLL VENOUS BLD VENIPUNCTURE: CPT | Performed by: FAMILY MEDICINE

## 2023-04-12 PROCEDURE — 84144 ASSAY OF PROGESTERONE: CPT | Performed by: FAMILY MEDICINE

## 2023-04-12 PROCEDURE — 83520 IMMUNOASSAY QUANT NOS NONAB: CPT | Performed by: FAMILY MEDICINE

## 2023-04-12 PROCEDURE — 84146 ASSAY OF PROLACTIN: CPT | Performed by: FAMILY MEDICINE

## 2023-04-12 RX ORDER — METFORMIN HCL 500 MG
1000 TABLET, EXTENDED RELEASE 24 HR ORAL 2 TIMES DAILY
Qty: 360 TABLET | Refills: 1 | Status: SHIPPED | OUTPATIENT
Start: 2023-04-12 | End: 2023-11-01

## 2023-04-12 ASSESSMENT — PAIN SCALES - GENERAL: PAINLEVEL: NO PAIN (0)

## 2023-04-12 NOTE — RESULT ENCOUNTER NOTE
Please call patient with the following results:  Thyroid is still off -- she might do better if we did synthroid and cytomel instead of armour and cytomel

## 2023-04-20 LAB — 17OHP SERPL-MCNC: 162 NG/DL

## 2023-05-08 DIAGNOSIS — E06.3 HYPOTHYROIDISM DUE TO HASHIMOTO'S THYROIDITIS: ICD-10-CM

## 2023-05-10 RX ORDER — THYROID 180 MG
TABLET ORAL
Qty: 90 TABLET | Refills: 0 | Status: SHIPPED | OUTPATIENT
Start: 2023-05-10 | End: 2023-08-07

## 2023-05-10 NOTE — TELEPHONE ENCOUNTER
Jud      Last Written Prescription Date:  2.14.23  Last Fill Quantity: #90,   # refills: 0  Last Office Visit: 4.12.23  Future Office visit:    Next 5 appointments (look out 90 days)    Jun 26, 2023  9:15 AM  (Arrive by 9:00 AM)  PHYSICAL with Jovana Lambert MD  [facility] [place] ([facility] [place] ) [address]  [place] [address]  [phone]           Routing refill request to provider for review/approval because:

## 2023-06-26 ENCOUNTER — OFFICE VISIT (OUTPATIENT)
Dept: FAMILY MEDICINE | Facility: OTHER | Age: 33
End: 2023-06-26
Attending: FAMILY MEDICINE
Payer: COMMERCIAL

## 2023-06-26 VITALS
WEIGHT: 212.4 LBS | SYSTOLIC BLOOD PRESSURE: 128 MMHG | HEART RATE: 98 BPM | DIASTOLIC BLOOD PRESSURE: 88 MMHG | HEIGHT: 65 IN | OXYGEN SATURATION: 98 % | BODY MASS INDEX: 35.39 KG/M2 | TEMPERATURE: 97.8 F

## 2023-06-26 DIAGNOSIS — E66.01 MORBID OBESITY (H): ICD-10-CM

## 2023-06-26 DIAGNOSIS — E78.5 HYPERLIPIDEMIA LDL GOAL <130: ICD-10-CM

## 2023-06-26 DIAGNOSIS — Z00.00 ROUTINE GENERAL MEDICAL EXAMINATION AT A HEALTH CARE FACILITY: Primary | ICD-10-CM

## 2023-06-26 DIAGNOSIS — E06.3 HYPOTHYROIDISM DUE TO HASHIMOTO'S THYROIDITIS: ICD-10-CM

## 2023-06-26 DIAGNOSIS — R79.89 ELEVATED PROLACTIN LEVEL: ICD-10-CM

## 2023-06-26 DIAGNOSIS — F33.0 MILD EPISODE OF RECURRENT MAJOR DEPRESSIVE DISORDER (H): ICD-10-CM

## 2023-06-26 DIAGNOSIS — E28.2 PCOS (POLYCYSTIC OVARIAN SYNDROME): ICD-10-CM

## 2023-06-26 LAB
ALBUMIN SERPL BCG-MCNC: 4.3 G/DL (ref 3.5–5.2)
ALP SERPL-CCNC: 69 U/L (ref 35–104)
ALT SERPL W P-5'-P-CCNC: 29 U/L (ref 0–50)
ANION GAP SERPL CALCULATED.3IONS-SCNC: 12 MMOL/L (ref 7–15)
AST SERPL W P-5'-P-CCNC: 21 U/L (ref 0–45)
BILIRUB SERPL-MCNC: 0.4 MG/DL
BUN SERPL-MCNC: 10.1 MG/DL (ref 6–20)
CALCIUM SERPL-MCNC: 9.7 MG/DL (ref 8.6–10)
CHLORIDE SERPL-SCNC: 103 MMOL/L (ref 98–107)
CHOLEST SERPL-MCNC: 173 MG/DL
CREAT SERPL-MCNC: 0.61 MG/DL (ref 0.51–0.95)
DEPRECATED HCO3 PLAS-SCNC: 23 MMOL/L (ref 22–29)
ESTRADIOL SERPL-MCNC: 111 PG/ML
GFR SERPL CREATININE-BSD FRML MDRD: >90 ML/MIN/1.73M2
GLUCOSE SERPL-MCNC: 101 MG/DL (ref 70–99)
HDLC SERPL-MCNC: 55 MG/DL
HOLD SPECIMEN: NORMAL
HOLD SPECIMEN: NORMAL
LDLC SERPL CALC-MCNC: 100 MG/DL
NONHDLC SERPL-MCNC: 118 MG/DL
POTASSIUM SERPL-SCNC: 4.1 MMOL/L (ref 3.4–5.3)
PROLACTIN SERPL 3RD IS-MCNC: 14 NG/ML (ref 5–23)
PROT SERPL-MCNC: 7.4 G/DL (ref 6.4–8.3)
SODIUM SERPL-SCNC: 138 MMOL/L (ref 136–145)
T4 FREE SERPL-MCNC: 1.36 NG/DL (ref 0.9–1.7)
TRIGL SERPL-MCNC: 89 MG/DL
TSH SERPL DL<=0.005 MIU/L-ACNC: 0.01 UIU/ML (ref 0.3–4.2)

## 2023-06-26 PROCEDURE — 84443 ASSAY THYROID STIM HORMONE: CPT | Performed by: FAMILY MEDICINE

## 2023-06-26 PROCEDURE — 84439 ASSAY OF FREE THYROXINE: CPT | Performed by: FAMILY MEDICINE

## 2023-06-26 PROCEDURE — 84480 ASSAY TRIIODOTHYRONINE (T3): CPT | Performed by: FAMILY MEDICINE

## 2023-06-26 PROCEDURE — 80061 LIPID PANEL: CPT | Performed by: FAMILY MEDICINE

## 2023-06-26 PROCEDURE — 36415 COLL VENOUS BLD VENIPUNCTURE: CPT | Performed by: FAMILY MEDICINE

## 2023-06-26 PROCEDURE — 99213 OFFICE O/P EST LOW 20 MIN: CPT | Mod: 25 | Performed by: FAMILY MEDICINE

## 2023-06-26 PROCEDURE — 99395 PREV VISIT EST AGE 18-39: CPT | Performed by: FAMILY MEDICINE

## 2023-06-26 PROCEDURE — 84481 FREE ASSAY (FT-3): CPT | Performed by: FAMILY MEDICINE

## 2023-06-26 PROCEDURE — 80053 COMPREHEN METABOLIC PANEL: CPT | Performed by: FAMILY MEDICINE

## 2023-06-26 PROCEDURE — 82670 ASSAY OF TOTAL ESTRADIOL: CPT | Performed by: FAMILY MEDICINE

## 2023-06-26 RX ORDER — BUPROPION HYDROCHLORIDE 150 MG/1
150 TABLET ORAL DAILY
Qty: 30 TABLET | Refills: 1 | Status: SHIPPED | OUTPATIENT
Start: 2023-06-26 | End: 2023-08-14 | Stop reason: SINTOL

## 2023-06-26 RX ORDER — LIOTHYRONINE SODIUM 5 UG/1
5 TABLET ORAL DAILY
Qty: 90 TABLET | Refills: 2 | Status: SHIPPED | OUTPATIENT
Start: 2023-06-26 | End: 2023-08-14

## 2023-06-26 SDOH — HEALTH STABILITY: PHYSICAL HEALTH: ON AVERAGE, HOW MANY DAYS PER WEEK DO YOU ENGAGE IN MODERATE TO STRENUOUS EXERCISE (LIKE A BRISK WALK)?: 0 DAYS

## 2023-06-26 SDOH — HEALTH STABILITY: PHYSICAL HEALTH: ON AVERAGE, HOW MANY MINUTES DO YOU ENGAGE IN EXERCISE AT THIS LEVEL?: 0 MIN

## 2023-06-26 ASSESSMENT — ENCOUNTER SYMPTOMS
ABDOMINAL PAIN: 0
COUGH: 0
JOINT SWELLING: 0
CONSTIPATION: 0
HEMATOCHEZIA: 0
HEARTBURN: 0
SHORTNESS OF BREATH: 0
HEADACHES: 0
HEMATURIA: 0
NERVOUS/ANXIOUS: 0
FEVER: 0
MYALGIAS: 0
FREQUENCY: 0
CHILLS: 0
EYE PAIN: 0
BREAST MASS: 0
PALPITATIONS: 0
SORE THROAT: 0
ARTHRALGIAS: 0
DIZZINESS: 0
NAUSEA: 0
PARESTHESIAS: 0
DYSURIA: 0
WEAKNESS: 0
DIARRHEA: 0

## 2023-06-26 ASSESSMENT — ANXIETY QUESTIONNAIRES
3. WORRYING TOO MUCH ABOUT DIFFERENT THINGS: SEVERAL DAYS
GAD7 TOTAL SCORE: 5
IF YOU CHECKED OFF ANY PROBLEMS ON THIS QUESTIONNAIRE, HOW DIFFICULT HAVE THESE PROBLEMS MADE IT FOR YOU TO DO YOUR WORK, TAKE CARE OF THINGS AT HOME, OR GET ALONG WITH OTHER PEOPLE: SOMEWHAT DIFFICULT
2. NOT BEING ABLE TO STOP OR CONTROL WORRYING: SEVERAL DAYS
8. IF YOU CHECKED OFF ANY PROBLEMS, HOW DIFFICULT HAVE THESE MADE IT FOR YOU TO DO YOUR WORK, TAKE CARE OF THINGS AT HOME, OR GET ALONG WITH OTHER PEOPLE?: SOMEWHAT DIFFICULT
7. FEELING AFRAID AS IF SOMETHING AWFUL MIGHT HAPPEN: NOT AT ALL
6. BECOMING EASILY ANNOYED OR IRRITABLE: SEVERAL DAYS
5. BEING SO RESTLESS THAT IT IS HARD TO SIT STILL: NOT AT ALL
4. TROUBLE RELAXING: SEVERAL DAYS
7. FEELING AFRAID AS IF SOMETHING AWFUL MIGHT HAPPEN: NOT AT ALL
1. FEELING NERVOUS, ANXIOUS, OR ON EDGE: SEVERAL DAYS
GAD7 TOTAL SCORE: 5
GAD7 TOTAL SCORE: 5

## 2023-06-26 ASSESSMENT — LIFESTYLE VARIABLES
HOW OFTEN DO YOU HAVE SIX OR MORE DRINKS ON ONE OCCASION: NEVER
HOW OFTEN DO YOU HAVE A DRINK CONTAINING ALCOHOL: 2-4 TIMES A MONTH
AUDIT-C TOTAL SCORE: 2
HOW MANY STANDARD DRINKS CONTAINING ALCOHOL DO YOU HAVE ON A TYPICAL DAY: 1 OR 2
SKIP TO QUESTIONS 9-10: 1

## 2023-06-26 ASSESSMENT — PAIN SCALES - GENERAL: PAINLEVEL: NO PAIN (0)

## 2023-06-26 ASSESSMENT — PATIENT HEALTH QUESTIONNAIRE - PHQ9
SUM OF ALL RESPONSES TO PHQ QUESTIONS 1-9: 10
SUM OF ALL RESPONSES TO PHQ QUESTIONS 1-9: 10
10. IF YOU CHECKED OFF ANY PROBLEMS, HOW DIFFICULT HAVE THESE PROBLEMS MADE IT FOR YOU TO DO YOUR WORK, TAKE CARE OF THINGS AT HOME, OR GET ALONG WITH OTHER PEOPLE: SOMEWHAT DIFFICULT

## 2023-06-26 NOTE — PROGRESS NOTES
SUBJECTIVE:   CC: Meredith is an 33 year old who presents for preventive health visit.     Healthy Habits:     Getting at least 3 servings of Calcium per day:  NO    Bi-annual eye exam:  Yes    Dental care twice a year:  Yes    Sleep apnea or symptoms of sleep apnea:  None    Diet:  Regular (no restrictions)    Frequency of exercise:  2-3 days/week    Duration of exercise:  Less than 15 minutes    Taking medications regularly:  Yes    Medication side effects:  None    PHQ-2 Total Score: 3    Additional concerns today:  No      Depression and Anxiety Follow-Up    How are you doing with your depression since your last visit? No change    How are you doing with your anxiety since your last visit?  No change    Are you having other symptoms that might be associated with depression or anxiety? Yes:  Fatigue, trouble completing tasks, lack of interest of doing things    Have you had a significant life event? No     Do you have any concerns with your use of alcohol or other drugs? No    Social History     Tobacco Use     Smoking status: Never     Smokeless tobacco: Never   Vaping Use     Vaping Use: Never used   Substance Use Topics     Alcohol use: Yes     Comment: Occasional     Drug use: Never         4/13/2022     9:00 AM 12/9/2022    10:46 AM 6/26/2023     9:03 AM   PHQ   PHQ-9 Total Score 2 5 10   Q9: Thoughts of better off dead/self-harm past 2 weeks Not at all Not at all Not at all         4/13/2022     9:00 AM 12/9/2022    10:46 AM 6/26/2023     9:03 AM   ROBBIE-7 SCORE   Total Score   5 (mild anxiety)   Total Score 3 2 5         6/26/2023     9:03 AM   Last PHQ-9   1.  Little interest or pleasure in doing things 2   2.  Feeling down, depressed, or hopeless 1   3.  Trouble falling or staying asleep, or sleeping too much 2   4.  Feeling tired or having little energy 3   5.  Poor appetite or overeating 1   6.  Feeling bad about yourself 1   7.  Trouble concentrating 0   8.  Moving slowly or restless 0   Q9: Thoughts  of better off dead/self-harm past 2 weeks 0   PHQ-9 Total Score 10         6/26/2023     9:03 AM   ROBBIE-7    1. Feeling nervous, anxious, or on edge 1   2. Not being able to stop or control worrying 1   3. Worrying too much about different things 1   4. Trouble relaxing 1   5. Being so restless that it is hard to sit still 0   6. Becoming easily annoyed or irritable 1   7. Feeling afraid, as if something awful might happen 0   ROBBIE-7 Total Score 5   If you checked any problems, how difficult have they made it for you to do your work, take care of things at home, or get along with other people? Somewhat difficult     Suicide Assessment Five-step Evaluation and Treatment (SAFE-T)    Hypothyroidism Follow-up      Since last visit, patient describes the following symptoms: loose stools          Social History     Tobacco Use     Smoking status: Never     Smokeless tobacco: Never   Substance Use Topics     Alcohol use: Yes     Comment: Occasional             6/26/2023     9:05 AM   Alcohol Use   Prescreen: >3 drinks/day or >7 drinks/week? No     Reviewed orders with patient.  Reviewed health maintenance and updated orders accordingly - Yes  Lab work is in process  Labs reviewed in EPIC  BP Readings from Last 3 Encounters:   06/26/23 128/88   04/12/23 124/78   03/09/23 102/70    Wt Readings from Last 3 Encounters:   06/26/23 96.3 kg (212 lb 6.4 oz)   04/12/23 94.8 kg (209 lb)   03/09/23 94.3 kg (208 lb)                  Patient Active Problem List   Diagnosis     PCOS (polycystic ovarian syndrome)     ACP (advance care planning)     Hypothyroidism due to Hashimoto's thyroiditis     Hyperlipidemia LDL goal <130     Iron deficiency     Rosacea     Enlarged lymph nodes     Sebaceous cyst     Weight gain     Adjustment disorder with depressed mood     Obesity (BMI 35.0-39.9) with comorbidity (H)     Other acne     Papilledema of both eyes     Spinal puncture headache     Vitamin B12 deficiency (non anemic)     Metrorrhagia      Hypokalemia     Insomnia, unspecified type     Somatic dysfunction of lumbar region     ROBBIE (generalized anxiety disorder)     Mild episode of recurrent major depressive disorder (H)     Thrombosed external hemorrhoids     Hypovitaminosis D     Elevated prolactin level     Insulin resistance     Past Surgical History:   Procedure Laterality Date     EXAM UNDER ANESTHESIA RECTUM N/A 8/9/2022    Procedure: EXAM UNDER ANESTHESIA, RECTUM;  Surgeon: Denilson Singh MD;  [facility]: [facility]     HEMORRHOIDECTOMY EXTERNAL N/A 8/9/2022    Procedure: Hemorrhoidectomy;  Surgeon: Denilson Singh MD;  Location: [facility]     MAMMOPLASTY REDUCTION  2015     wisdom teeth         Social History     Tobacco Use     Smoking status: Never     Smokeless tobacco: Never   Substance Use Topics     Alcohol use: Yes     Comment: Occasional     Family History   Problem Relation Age of Onset     Thyroid Disease Mother         hypothyroidism     Anxiety Disorder Mother      Nephrolithiasis Father      Cataracts Father      Other - See Comments Sister         pcos     Anxiety Disorder Sister      Family History Negative Sister      Family History Negative Sister      Family History Negative Brother      Depression Brother      Thyroid Disease Brother      Family History Negative Brother      Hypothyroidism Maternal Grandmother      Cerebrovascular Disease Maternal Grandmother      Rheumatoid Arthritis Maternal Grandfather      Diabetes Paternal Grandfather         type 2     Hypertension Paternal Grandfather          Current Outpatient Medications   Medication Sig Dispense Refill     Acetylcysteine, Nutrient, 600 MG TABS Take 600 mg by mouth 2 times daily       ARMOUR THYROID 180 MG tablet Take 1 tablet (180 mg) by mouth daily 90 tablet 0     buPROPion (WELLBUTRIN XL) 150 MG 24 hr tablet Take 1 tablet (150 mg) by mouth daily 30 tablet 1     liothyronine (CYTOMEL) 5 MCG tablet Take 1 tablet (5 mcg) by mouth daily 90 tablet 2     metFORMIN (GLUCOPHAGE  XR) 500 MG 24 hr tablet Take 2 tablets (1,000 mg) by mouth 2 times daily for 90 days 360 tablet 1     sertraline (ZOLOFT) 100 MG tablet Take 1 Tablet by mouth one time a day. 90 tablet 2     triamcinolone (KENALOG) 0.1 % external cream Apply topically 2 times daily 45 g 1     vitamin D3 (CHOLECALCIFEROL) 2000 units (50 mcg) tablet Take 2 tablets (4,000 Units) by mouth daily 90 tablet 1     Allergies   Allergen Reactions     Cats Itching     Cat/feline product derivatives     Recent Labs   Lab Test 23  0925 23  0952 23  0730 22  1155 21  0746 21  1619 20  1552 20  1922 10/17/19  0932 19  0803     --   --   --   --  129*  --   --   --  99   HDL 55  --   --   --   --  53  --   --   --  63   TRIG 89  --   --   --   --  174*  --   --   --  117   ALT 29  --   --  24  --  29  --  23  --  27   CR 0.61  --   --  0.75   < > 0.85  --  0.72  --  0.71   GFRESTIMATED >90  --   --  >90   < > >90  --  >90  --  >90   GFRESTBLACK  --   --   --   --   --   --   --  >90  --  >90   POTASSIUM 4.1  --   --  3.7   < > 3.2*  --  3.8  --  4.0   TSH 0.01* 0.03*   < > 15.19*   < > 1.06   < > 8.63*   < > <0.01*    < > = values in this interval not displayed.        Breast Cancer Screenin/28/2021     8:12 AM   Breast CA Risk Assessment (FHS-7)   Do you have a family history of breast, colon, or ovarian cancer? No / Unknown         Patient under 40 years of age: Routine Mammogram Screening not recommended.   Pertinent mammograms are reviewed under the imaging tab.    History of abnormal Pap smear: NO - age 30-65 PAP every 5 years with negative HPV co-testing recommended      2016    12:00 AM   PAP / HPV   PAP (Historical) NIL      Reviewed and updated as needed this visit by clinical staff   Tobacco  Allergies  Meds    Surg Hx  Fam Hx  Soc Hx        Reviewed and updated as needed this visit by Provider   Tobacco      Surg Hx  Fam Hx  Soc Hx       Past Medical  "History:   Diagnosis Date     2019 novel coronavirus disease (COVID-19) 2021     B12 deficiency 2012    resolved     Hashimoto's disease 2011     Mild scoliosis      [facility]phrolithiasis      in [facility] ([facility])     PCOS (polycystic ovarian syndrome)      Recurrent major depressive disorder, in partial remission (H)       Past Surgical History:   Procedure Laterality Date     EXAM UNDER ANESTHESIA RECTUM N/A 2022    Procedure: EXAM UNDER ANESTHESIA, RECTUM;  Surgeon: Denilson Singh MD;  [facility]     HEMORRHOIDECTOMY EXTERNAL N/A 2022    Procedure: Hemorrhoidectomy;  Surgeon: Denilson Singh MD;  [facility]: [facility]     MAMMOPLASTY REDUCTION       wisdom teeth       OB History    Para Term  AB Living   0 0 0 0 0 0   SAB IAB Ectopic Multiple Live Births   0 0 0 0 0       Review of Systems   Constitutional: Negative for chills and fever.   HENT: Negative for congestion, ear pain, hearing loss and sore throat.    Eyes: Negative for pain and visual disturbance.   Respiratory: Negative for cough and shortness of breath.    Cardiovascular: Negative for chest pain, palpitations and peripheral edema.   Gastrointestinal: Negative for abdominal pain, constipation, diarrhea, heartburn, hematochezia and nausea.   Breasts:  Negative for tenderness, breast mass and discharge.   Genitourinary: Negative for dysuria, frequency, genital sores, hematuria, pelvic pain, urgency, vaginal bleeding and vaginal discharge.   Musculoskeletal: Negative for arthralgias, joint swelling and myalgias.   Skin: Negative for rash.   Neurological: Negative for dizziness, weakness, headaches and paresthesias.   Psychiatric/Behavioral: Negative for mood changes. The patient is not nervous/anxious.         OBJECTIVE:   /88 (BP Location: Left arm, Patient Position: Sitting, Cuff Size: Adult Regular)   Pulse 98   Temp 97.8  F (36.6  C) (Tympanic)   Ht 1.651 m (5' 5\")   Wt 96.3 kg (212 lb " 6.4 oz)   LMP 06/05/2023   SpO2 98%   BMI 35.35 kg/m    Physical Exam  GENERAL: healthy, alert and no distress  EYES: Eyes grossly normal to inspection, PERRL and conjunctivae and sclerae normal  HENT: ear canals and TM's normal, nose and mouth without ulcers or lesions  NECK: no adenopathy, no asymmetry, masses, or scars and thyroid normal to palpation  RESP: lungs clear to auscultation - no rales, rhonchi or wheezes  BREAST: normal without masses, tenderness or nipple discharge and no palpable axillary masses or adenopathy  CV: regular rate and rhythm, normal S1 S2, no S3 or S4, no murmur, click or rub, no peripheral edema and peripheral pulses strong  ABDOMEN: soft, nontender, no hepatosplenomegaly, no masses and bowel sounds normal   (female): normal female external genitalia, normal urethral meatus, vaginal mucosa pink, moist, well rugated, and normal cervix/adnexa/uterus without masses or discharge  MS: no gross musculoskeletal defects noted, no edema  SKIN: no suspicious lesions or rashes  NEURO: Normal strength and tone, mentation intact and speech normal  PSYCH: mentation appears normal, affect normal/bright    Diagnostic Test Results:  Labs reviewed in Epic  Results for orders placed or performed in visit on 06/26/23   Extra Tube     Status: None (In process)    Narrative    The following orders were created for panel order Extra Tube.  Procedure                               Abnormality         Status                     ---------                               -----------         ------                     Extra Red Top Tube[[id number]]                               In process                 Extra Green Top (Lithium...[[id number]]                      In process                   Please view results for these tests on the individual orders.   Comprehensive metabolic panel (BMP + Alb, Alk Phos, ALT, AST, Total. Bili, TP)     Status: Abnormal   Result Value Ref Range    Sodium 138 136 - 145 mmol/L     Potassium 4.1 3.4 - 5.3 mmol/L    Chloride 103 98 - 107 mmol/L    Carbon Dioxide (CO2) 23 22 - 29 mmol/L    Anion Gap 12 7 - 15 mmol/L    Urea Nitrogen 10.1 6.0 - 20.0 mg/dL    Creatinine 0.61 0.51 - 0.95 mg/dL    Calcium 9.7 8.6 - 10.0 mg/dL    Glucose 101 (H) 70 - 99 mg/dL    Alkaline Phosphatase 69 35 - 104 U/L    AST 21 0 - 45 U/L    ALT 29 0 - 50 U/L    Protein Total 7.4 6.4 - 8.3 g/dL    Albumin 4.3 3.5 - 5.2 g/dL    Bilirubin Total 0.4 <=1.2 mg/dL    GFR Estimate >90 >60 mL/min/1.73m2   Lipid Profile (Chol, Trig, HDL, LDL calc)     Status: Normal   Result Value Ref Range    Cholesterol 173 <200 mg/dL    Triglycerides 89 <150 mg/dL    Direct Measure HDL 55 >=50 mg/dL    LDL Cholesterol Calculated 100 <=100 mg/dL    Non HDL Cholesterol 118 <130 mg/dL    Narrative    Cholesterol  Desirable:  <200 mg/dL    Triglycerides  Normal:  Less than 150 mg/dL  Borderline High:  150-199 mg/dL  High:  200-499 mg/dL  Very High:  Greater than or equal to 500 mg/dL    Direct Measure HDL  Female:  Greater than or equal to 50 mg/dL   Male:  Greater than or equal to 40 mg/dL    LDL Cholesterol  Desirable:  <100mg/dL  Above Desirable:  100-129 mg/dL   Borderline High:  130-159 mg/dL   High:  160-189 mg/dL   Very High:  >= 190 mg/dL    Non HDL Cholesterol  Desirable:  130 mg/dL  Above Desirable:  130-159 mg/dL  Borderline High:  160-189 mg/dL  High:  190-219 mg/dL  Very High:  Greater than or equal to 220 mg/dL   TSH with free T4 reflex     Status: Abnormal   Result Value Ref Range    TSH 0.01 (L) 0.30 - 4.20 uIU/mL       ASSESSMENT/PLAN:   (Z00.00) Routine general medical examination at a health care facility  (primary encounter diagnosis)  Comment:   Plan: follow-up 1 year    (E78.5) Hyperlipidemia LDL goal <130  Comment:   Plan: Comprehensive metabolic panel (BMP + Alb, Alk         Phos, ALT, AST, Total. Bili, TP), Lipid Profile        (Chol, Trig, HDL, LDL calc)        Numbers look better this year    (E03.8,  E06.3)  "Hypothyroidism due to Hashimoto's thyroiditis  Comment:   Plan: liothyronine (CYTOMEL) 5 MCG tablet, TSH with         free T4 reflex, T4 free, T3, Free, T3, total        TSH is low, T3 levels pending    (E28.2) PCOS (polycystic ovarian syndrome)  Comment:   Plan: check estrogen today    (R79.89) Elevated prolactin level  Comment:   Plan: continue cabergoline    (F33.0) Mild episode of recurrent major depressive disorder (H)  Comment:   Plan: buPROPion (WELLBUTRIN XL) 150 MG 24 hr tablet        Lengthy discussion today, with thyroid levels back, this is a contributing factor as well  Encourage therapy, add wellbutrin and follow-up 6-7 wks    (E66.01) Morbid obesity (H)  Comment:   Plan: computer generated diagnosis, she is working on this    COUNSELING:  Reviewed preventive health counseling, as reflected in patient instructions  Special attention given to:        Regular exercise       Healthy diet/nutrition       Vision screening       Hearing screening    55 minutes spent by me on the date of the encounter doing chart review, history and exam, documentation and further activities per the note, over 40 minutes spent on acute and chronic healthcare issues and remaining time spent on exam and healthcare maintenance      BMI:   Estimated body mass index is 35.35 kg/m  as calculated from the following:    Height as of this encounter: 1.651 m (5' 5\").    Weight as of this encounter: 96.3 kg (212 lb 6.4 oz).   Weight management plan: Discussed healthy diet and exercise guidelines      She reports that she has never smoked. She has never used smokeless tobacco.          Jovana Lambert MD  [facility] [place]  Answers for HPI/ROS submitted by the patient on 6/26/2023  If you checked off any problems, how difficult have these problems made it for you to do your work, take care of things at home, or get along with other people?: Somewhat difficult  PHQ9 TOTAL SCORE: 10  ROBBIE 7 TOTAL SCORE: 5      "

## 2023-06-26 NOTE — COMMUNITY RESOURCES LIST (ENGLISH)
06/26/2023   [facility] - Outpatient Clinics  N/A  For questions about this resource list or additional care needs, please contact your primary care clinic or care manager.  Phone: [phone]   Email: N/A   Address: [address] [place] [id number]   Hours: N/A        Exercise and Recreation       Gym or workout facility  1  [facility] [[email or web address]] Distance: 18.47 miles      In-Person   [address] [place] [address]  Language: English  Hours: Mon - Sun Open 24 Hours  Fees: Insurance, Self Pay, Sliding Fee   Phone: [phone] Email: virginia@[email or web address] Website: [email or web address]/[email or web address]          Important Numbers & Websites       Emergency Services   911  City Services   311  Poison Control   (405) 683-5085  Suicide Prevention Lifeline   (424) 625-2136 (TALK)  Child Abuse Hotline   (285) 458-7646 (4-A-Child)  Sexual Assault Hotline   (333) 528-1247 (HOPE)  National Runaway Safeline   (332) 948-1905 (RUNAWAY)  All-Options Talkline   (307) 939-6596  Substance Abuse Referral   (374) 328-7272 (HELP)

## 2023-06-26 NOTE — PATIENT INSTRUCTIONS
The Mindful Sikhism by Dr Brendan Sanz (dynamic Yarsanism)  [facility]  3.  [employer]   4.  National Sikhism Singles conference    Psychologists/ Counselors                        Chelita Oquendo          ...            ..[phone]  [facility]                    ..  [phone]  [facility]                 .[phone]  [facility] (kids)       .         [phone]  [facility] [employer](teens)                ..[phone]  [facility]                    [phone]  [facility]                   [phone]  [facility]           ...      .. [phone]  [facility]                ...[phone]  [facility]     ...          ...[phone]  [facility]               [phone]   [facility]..            [phone]  [facility]                       [phone]  [facility]               .    [phone]  [facility]                ..[phone]  [facility]     .      . ..[phone]                [facility]              .   [phone]  [facility]              .  ...[phone]  [facility]              .   ..[phone]  [facility]           ...      .. [phone]  [facility]              . ..[phone]  [facility]              .    ..[phone]  [facility]              .     .. [phone]  [facility]                 ... [phone]  [facility]                         .[phone]  [facility]     .      . ..[phone]  [facility]**...............................................................[phone]       [facility]              ..  [phone]              [facility]             . ... ..[phone]  [facility]              .      ..[phone]  Leslie  Kasia**              .     ..[phone]  [facility]**        .      . [phone]  [facility]           .[phone]  [facility]         .    ..[phone]  [employer]             . ..  ..  [phone]  [facility]            [phone]  [facility]              ..[phone]  [facility]                     .[phone]               [facility]ant  Hari Joe                ..  .  [phone]  [facility]**         .     .  [phone]  [facility] SATURN[facility]mal[facility]              . [phone]  [facility]  ..        [phone]  [facility]              .    [phone]    [facility]               ...  .  [phone]  [facility]             . [phone]  [facility]            [phone]  [facility]     .      . ..[phone]     Crisis support    If you or someone you know is struggling or in crisis, help is available. Call or text 739 or chat [facility]    The volunteer Crisis Counselor will help you move from a 'hot moment to a cool moment'        Preventive Health Recommendations  Female Ages 26 - 39  Yearly exam:   See your health care provider every year in order to  Review health changes.   Discuss preventive care.    Review your medicines if you your doctor has prescribed any.    Until age 30: Get a Pap test every three years (more often if you have had an abnormal result).    After age 30: Talk to your doctor about whether you should have a Pap test every 3 years or have a Pap test with HPV screening every 5 years.   You do not need a Pap test if your uterus was removed (hysterectomy) and you have not had cancer.  You should be tested each year for STDs (sexually transmitted diseases), if you're at risk.   Talk to your provider about how often to have your cholesterol checked.  If you are at risk for  diabetes, you should have a diabetes test (fasting glucose).  Shots: Get a flu shot each year. Get a tetanus shot every 10 years.   Nutrition:   Eat at least 5 servings of fruits and vegetables each day.  Eat whole-grain bread, whole-wheat pasta and brown rice instead of white grains and rice.  Get adequate Calcium and Vitamin D.     Lifestyle  Exercise at least 150 minutes a week (30 minutes a day, 5 days of the week). This will help you control your weight and prevent disease.  Limit alcohol to one drink per day.  No smoking.   Wear sunscreen to prevent skin cancer.  See your dentist every six months for an exam and cleaning.

## 2023-06-28 LAB
T3 SERPL-MCNC: 317 NG/DL (ref 85–202)
T3FREE SERPL-MCNC: 8.9 PG/ML (ref 2–4.4)

## 2023-06-28 NOTE — RESULT ENCOUNTER NOTE
Please call patient with the following results:  Prolactin level was not supposed to be done please credit the patient estrogen levels look great!

## 2023-07-13 ENCOUNTER — MYC MEDICAL ADVICE (OUTPATIENT)
Dept: FAMILY MEDICINE | Facility: OTHER | Age: 33
End: 2023-07-13

## 2023-07-13 NOTE — TELEPHONE ENCOUNTER
Note.  Definitely not normal she should stop the Wellbutrin, could take every other day for 3 doses and then stop

## 2023-07-18 NOTE — TELEPHONE ENCOUNTER
ranitidine (ZANTAC) 300 MG tablet   Last Written Prescription Date:  12/4/17  Last Fill Quantity: 30,   # refills: 8  Last Office Visit: 9/20/17  Future Office visit:       Aldactone       Last Written Prescription Date:  8/16/17  Last Fill Quantity: 180,   # refills: 1  Last Office Visit: 9/20/17  Future Office visit:          Total Volume Injected ([facility]- Only Use Numbers And Decimals): 0.6

## 2023-08-07 DIAGNOSIS — E06.3 HYPOTHYROIDISM DUE TO HASHIMOTO'S THYROIDITIS: ICD-10-CM

## 2023-08-07 RX ORDER — THYROID 180 MG
180 TABLET ORAL DAILY
Qty: 90 TABLET | Refills: 0 | Status: SHIPPED | OUTPATIENT
Start: 2023-08-07 | End: 2023-08-14

## 2023-08-07 NOTE — TELEPHONE ENCOUNTER
Armor thyroid 180 mg      Last Written Prescription Date:  05/10/2023  Last Fill Quantity: 90,   # refills: 0  Last Office Visit: 06/26/2023  Future Office visit:    Next 5 appointments (look out 90 days)      Aug 14, 2023  8:45 AM  (Arrive by 8:30 AM)  SHORT with Jovana Lambert MD  [facility] [place] ([facility] [place] ) [address]  [place] [address]  [phone]             Routing refill request to provider for review/approval because:  Drug not on the [facility], P or [facility] refill protocol or controlled substance

## 2023-08-10 NOTE — PROGRESS NOTES
Assessment & Plan     Hypothyroidism due to Hashimoto's thyroiditis  Change to synthroid and cytomel, repeat labs 2 mos  Start vit c daily  - T3, Free  - T3, total  - TSH with free T4 reflex  - T4 free  - levothyroxine (SYNTHROID/LEVOTHROID) 88 MCG tablet; Take 1 tablet (88 mcg) by mouth daily  - liothyronine (CYTOMEL) 5 MCG tablet; Take 1 tablet (5 mcg) by mouth 2 times daily  - TSH with free T4 reflex; Future    PCOS (polycystic ovarian syndrome)  Switch to inositol  Doing ok, need to get thyroid better managed and switching to synthroid should help    Mild episode of recurrent major depressive disorder (H)  Wellbutrin caused negative side effects, doing ok for now          Patient was agreeable to this plan and had no further questions.  Patient Instructions    Vitamin c 500mg daily  Inositol 2 grams 2x/day (start when you finish NAC off)    No follow-ups on file.    Jovana Lambert MD  [facility] - R[place] Harper is a 33 year old, presenting for the following health issues:  Thyroid Problem and Depression (MDD)      HPI     Abnormal Mood Symptoms    Duration: Follow Up  Description:  Depression: YES  Anxiety: YES  Panic attacks: no    Accompanying signs and symptoms: see PHQ-9 and ROBBIE scores  History (similar episodes/previous evaluation): Yes  Precipitating or alleviating factors: None  Therapies tried and outcome: Zoloft (Sertraline)        Social History     Tobacco Use    Smoking status: Never    Smokeless tobacco: Never   Vaping Use    Vaping Use: Never used   Substance Use Topics    Alcohol use: Yes     Comment: Occasional    Drug use: Never         12/9/2022    10:46 AM 6/26/2023     9:03 AM 8/13/2023     4:51 PM   PHQ   PHQ-9 Total Score 5 10 9   Q9: Thoughts of better off dead/self-harm past 2 weeks Not at all Not at all Not at all         4/13/2022     9:00 AM 12/9/2022    10:46 AM 6/26/2023     9:03 AM   ROBBIE-7 SCORE   Total Score   5 (mild anxiety)   Total Score 3 2 5          8/13/2023     4:51 PM   Last PHQ-9   1.  Little interest or pleasure in doing things 1   2.  Feeling down, depressed, or hopeless 1   3.  Trouble falling or staying asleep, or sleeping too much 2   4.  Feeling tired or having little energy 2   5.  Poor appetite or overeating 1   6.  Feeling bad about yourself 1   7.  Trouble concentrating 1   8.  Moving slowly or restless 0   Q9: Thoughts of better off dead/self-harm past 2 weeks 0   PHQ-9 Total Score 9         6/26/2023     9:03 AM   ROBBIE-7    1. Feeling nervous, anxious, or on edge 1   2. Not being able to stop or control worrying 1   3. Worrying too much about different things 1   4. Trouble relaxing 1   5. Being so restless that it is hard to sit still 0   6. Becoming easily annoyed or irritable 1   7. Feeling afraid, as if something awful might happen 0   ROBBIE-7 Total Score 5   If you checked any problems, how difficult have they made it for you to do your work, take care of things at home, or get along with other people? Somewhat difficult       Suicide Assessment Five-step Evaluation and Treatment (SAFE-T)    Hypothyroidism Follow-up    Since last visit, patient describes the following symptoms: Weight stable, no hair loss, no skin changes, no constipation, no loose stools      Review of Systems   Constitutional, HEENT, cardiovascular, pulmonary, gi and gu systems are negative, except as otherwise noted.      Objective    /70 (BP Location: Left arm, Patient Position: Chair, Cuff Size: Adult Regular)   Pulse 84   Temp 98.2  F (36.8  C) (Tympanic)   Resp 18   Wt 93.4 kg (206 lb)   LMP 07/25/2023   SpO2 98%   BMI 34.28 kg/m    Body mass index is 34.28 kg/m .  Physical Exam   GENERAL: healthy, alert and no distress  NECK: no adenopathy, no asymmetry, masses, or scars and thyroid normal to palpation  RESP: lungs clear to auscultation - no rales, rhonchi or wheezes  CV: regular rate and rhythm, normal S1 S2, no S3 or S4, no murmur, click or rub,  no peripheral edema and peripheral pulses strong  MS: no gross musculoskeletal defects noted, no edema  PSYCH: mentation appears normal, affect normal/bright    Results for orders placed or performed in visit on 08/14/23   T3, Free     Status: Abnormal   Result Value Ref Range    T3 Free 8.9 (H) 2.0 - 4.4 pg/mL   T3, total     Status: Abnormal   Result Value Ref Range    T3 Total 322 (H) 85 - 202 ng/dL   TSH with free T4 reflex     Status: Abnormal   Result Value Ref Range    TSH 0.01 (L) 0.30 - 4.20 uIU/mL   T4 free     Status: Normal   Result Value Ref Range    Free T4 1.20 0.90 - 1.70 ng/dL         Answers submitted by the patient for this visit:  Patient Health Questionnaire (Submitted on 8/13/2023)  If you checked off any problems, how difficult have these problems made it for you to do your work, take care of things at home, or get along with other people?: Somewhat difficult  PHQ9 TOTAL SCORE: 9

## 2023-08-13 ASSESSMENT — PATIENT HEALTH QUESTIONNAIRE - PHQ9
SUM OF ALL RESPONSES TO PHQ QUESTIONS 1-9: 9
SUM OF ALL RESPONSES TO PHQ QUESTIONS 1-9: 9
10. IF YOU CHECKED OFF ANY PROBLEMS, HOW DIFFICULT HAVE THESE PROBLEMS MADE IT FOR YOU TO DO YOUR WORK, TAKE CARE OF THINGS AT HOME, OR GET ALONG WITH OTHER PEOPLE: SOMEWHAT DIFFICULT

## 2023-08-14 ENCOUNTER — OFFICE VISIT (OUTPATIENT)
Dept: FAMILY MEDICINE | Facility: OTHER | Age: 33
End: 2023-08-14
Attending: FAMILY MEDICINE
Payer: COMMERCIAL

## 2023-08-14 VITALS
TEMPERATURE: 98.2 F | HEART RATE: 84 BPM | OXYGEN SATURATION: 98 % | SYSTOLIC BLOOD PRESSURE: 112 MMHG | WEIGHT: 206 LBS | RESPIRATION RATE: 18 BRPM | BODY MASS INDEX: 34.28 KG/M2 | DIASTOLIC BLOOD PRESSURE: 70 MMHG

## 2023-08-14 DIAGNOSIS — F33.0 MILD EPISODE OF RECURRENT MAJOR DEPRESSIVE DISORDER (H): ICD-10-CM

## 2023-08-14 DIAGNOSIS — E28.2 PCOS (POLYCYSTIC OVARIAN SYNDROME): Primary | ICD-10-CM

## 2023-08-14 DIAGNOSIS — E06.3 HYPOTHYROIDISM DUE TO HASHIMOTO'S THYROIDITIS: ICD-10-CM

## 2023-08-14 LAB
PROLACTIN SERPL 3RD IS-MCNC: 16 NG/ML (ref 5–23)
T3 SERPL-MCNC: 322 NG/DL (ref 85–202)
T3FREE SERPL-MCNC: 8.9 PG/ML (ref 2–4.4)
T4 FREE SERPL-MCNC: 1.2 NG/DL (ref 0.9–1.7)
TSH SERPL DL<=0.005 MIU/L-ACNC: 0.01 UIU/ML (ref 0.3–4.2)

## 2023-08-14 PROCEDURE — 84146 ASSAY OF PROLACTIN: CPT | Performed by: FAMILY MEDICINE

## 2023-08-14 PROCEDURE — 84443 ASSAY THYROID STIM HORMONE: CPT | Performed by: FAMILY MEDICINE

## 2023-08-14 PROCEDURE — 84439 ASSAY OF FREE THYROXINE: CPT | Performed by: FAMILY MEDICINE

## 2023-08-14 PROCEDURE — 84480 ASSAY TRIIODOTHYRONINE (T3): CPT | Performed by: FAMILY MEDICINE

## 2023-08-14 PROCEDURE — 84481 FREE ASSAY (FT-3): CPT | Performed by: FAMILY MEDICINE

## 2023-08-14 PROCEDURE — 99214 OFFICE O/P EST MOD 30 MIN: CPT | Performed by: FAMILY MEDICINE

## 2023-08-14 PROCEDURE — 36415 COLL VENOUS BLD VENIPUNCTURE: CPT | Performed by: FAMILY MEDICINE

## 2023-08-14 RX ORDER — LEVOTHYROXINE SODIUM 88 UG/1
88 TABLET ORAL DAILY
Qty: 30 TABLET | Refills: 1 | Status: SHIPPED | OUTPATIENT
Start: 2023-08-14 | End: 2023-11-01

## 2023-08-14 RX ORDER — LIOTHYRONINE SODIUM 5 UG/1
5 TABLET ORAL 2 TIMES DAILY
Qty: 180 TABLET | Refills: 0 | Status: SHIPPED | OUTPATIENT
Start: 2023-08-14 | End: 2023-12-14

## 2023-08-14 ASSESSMENT — PAIN SCALES - GENERAL: PAINLEVEL: NO PAIN (0)

## 2023-10-05 ENCOUNTER — LAB (OUTPATIENT)
Dept: LAB | Facility: OTHER | Age: 33
End: 2023-10-05
Payer: COMMERCIAL

## 2023-10-05 DIAGNOSIS — E06.3 HYPOTHYROIDISM DUE TO HASHIMOTO'S THYROIDITIS: Primary | ICD-10-CM

## 2023-10-05 DIAGNOSIS — E06.3 HYPOTHYROIDISM DUE TO HASHIMOTO'S THYROIDITIS: ICD-10-CM

## 2023-10-05 DIAGNOSIS — R79.89 ELEVATED PROLACTIN LEVEL: ICD-10-CM

## 2023-10-05 LAB
PROLACTIN SERPL 3RD IS-MCNC: 27 NG/ML (ref 5–23)
T4 FREE SERPL-MCNC: 0.7 NG/DL (ref 0.9–1.7)
TSH SERPL DL<=0.005 MIU/L-ACNC: 8.4 UIU/ML (ref 0.3–4.2)

## 2023-10-05 PROCEDURE — 84146 ASSAY OF PROLACTIN: CPT

## 2023-10-05 PROCEDURE — 36415 COLL VENOUS BLD VENIPUNCTURE: CPT

## 2023-10-05 PROCEDURE — 84439 ASSAY OF FREE THYROXINE: CPT

## 2023-10-05 PROCEDURE — 84443 ASSAY THYROID STIM HORMONE: CPT

## 2023-10-05 RX ORDER — LEVOTHYROXINE SODIUM 100 UG/1
100 TABLET ORAL DAILY
Qty: 30 TABLET | Refills: 1 | Status: SHIPPED | OUTPATIENT
Start: 2023-10-05 | End: 2023-11-01 | Stop reason: DRUGHIGH

## 2023-10-09 DIAGNOSIS — E22.1 HYPERPROLACTINEMIA (H): Primary | ICD-10-CM

## 2023-10-09 RX ORDER — CABERGOLINE 0.5 MG/1
0.5 TABLET ORAL WEEKLY
Qty: 12 TABLET | Refills: 1 | Status: SHIPPED | OUTPATIENT
Start: 2023-10-09

## 2023-11-01 ENCOUNTER — LAB (OUTPATIENT)
Dept: LAB | Facility: OTHER | Age: 33
End: 2023-11-01
Attending: FAMILY MEDICINE
Payer: COMMERCIAL

## 2023-11-01 ENCOUNTER — OFFICE VISIT (OUTPATIENT)
Dept: FAMILY MEDICINE | Facility: OTHER | Age: 33
End: 2023-11-01
Attending: FAMILY MEDICINE
Payer: COMMERCIAL

## 2023-11-01 VITALS
BODY MASS INDEX: 35.28 KG/M2 | HEART RATE: 77 BPM | TEMPERATURE: 97.6 F | DIASTOLIC BLOOD PRESSURE: 62 MMHG | SYSTOLIC BLOOD PRESSURE: 110 MMHG | OXYGEN SATURATION: 98 % | WEIGHT: 212 LBS

## 2023-11-01 DIAGNOSIS — E06.3 HYPOTHYROIDISM DUE TO HASHIMOTO'S THYROIDITIS: ICD-10-CM

## 2023-11-01 DIAGNOSIS — J06.9 VIRAL URI WITH COUGH: Primary | ICD-10-CM

## 2023-11-01 DIAGNOSIS — E28.2 PCOS (POLYCYSTIC OVARIAN SYNDROME): ICD-10-CM

## 2023-11-01 DIAGNOSIS — E22.1 HYPERPROLACTINEMIA (H): ICD-10-CM

## 2023-11-01 LAB
T4 FREE SERPL-MCNC: 1.1 NG/DL (ref 0.9–1.7)
TSH SERPL DL<=0.005 MIU/L-ACNC: 4.4 UIU/ML (ref 0.3–4.2)

## 2023-11-01 PROCEDURE — 84439 ASSAY OF FREE THYROXINE: CPT

## 2023-11-01 PROCEDURE — 84443 ASSAY THYROID STIM HORMONE: CPT

## 2023-11-01 PROCEDURE — 36415 COLL VENOUS BLD VENIPUNCTURE: CPT

## 2023-11-01 PROCEDURE — 84146 ASSAY OF PROLACTIN: CPT

## 2023-11-01 PROCEDURE — 99214 OFFICE O/P EST MOD 30 MIN: CPT | Performed by: FAMILY MEDICINE

## 2023-11-01 RX ORDER — LEVOTHYROXINE SODIUM 100 UG/1
100 TABLET ORAL DAILY
Qty: 90 TABLET | Refills: 1 | Status: SHIPPED | OUTPATIENT
Start: 2023-11-01 | End: 2023-12-28 | Stop reason: DRUGHIGH

## 2023-11-01 RX ORDER — LEVOTHYROXINE SODIUM 100 UG/1
100 TABLET ORAL DAILY
Qty: 30 TABLET | Refills: 1 | Status: CANCELLED | OUTPATIENT
Start: 2023-11-01

## 2023-11-01 RX ORDER — METFORMIN HCL 500 MG
1000 TABLET, EXTENDED RELEASE 24 HR ORAL 2 TIMES DAILY
Qty: 360 TABLET | Refills: 3 | Status: SHIPPED | OUTPATIENT
Start: 2023-11-01 | End: 2024-02-08

## 2023-11-01 RX ORDER — BENZONATATE 100 MG/1
100-200 CAPSULE ORAL 3 TIMES DAILY PRN
Qty: 30 CAPSULE | Refills: 0 | Status: SHIPPED | OUTPATIENT
Start: 2023-11-01 | End: 2024-02-08

## 2023-11-01 RX ORDER — LEVOTHYROXINE SODIUM 112 UG/1
112 TABLET ORAL DAILY
Qty: 30 TABLET | Refills: 1 | Status: SHIPPED | OUTPATIENT
Start: 2023-11-01 | End: 2023-11-01 | Stop reason: DRUGHIGH

## 2023-11-01 ASSESSMENT — ANXIETY QUESTIONNAIRES
7. FEELING AFRAID AS IF SOMETHING AWFUL MIGHT HAPPEN: NOT AT ALL
2. NOT BEING ABLE TO STOP OR CONTROL WORRYING: SEVERAL DAYS
1. FEELING NERVOUS, ANXIOUS, OR ON EDGE: SEVERAL DAYS
7. FEELING AFRAID AS IF SOMETHING AWFUL MIGHT HAPPEN: NOT AT ALL
GAD7 TOTAL SCORE: 5
3. WORRYING TOO MUCH ABOUT DIFFERENT THINGS: SEVERAL DAYS
GAD7 TOTAL SCORE: 5
6. BECOMING EASILY ANNOYED OR IRRITABLE: SEVERAL DAYS
IF YOU CHECKED OFF ANY PROBLEMS ON THIS QUESTIONNAIRE, HOW DIFFICULT HAVE THESE PROBLEMS MADE IT FOR YOU TO DO YOUR WORK, TAKE CARE OF THINGS AT HOME, OR GET ALONG WITH OTHER PEOPLE: SOMEWHAT DIFFICULT
5. BEING SO RESTLESS THAT IT IS HARD TO SIT STILL: NOT AT ALL
4. TROUBLE RELAXING: SEVERAL DAYS
8. IF YOU CHECKED OFF ANY PROBLEMS, HOW DIFFICULT HAVE THESE MADE IT FOR YOU TO DO YOUR WORK, TAKE CARE OF THINGS AT HOME, OR GET ALONG WITH OTHER PEOPLE?: SOMEWHAT DIFFICULT
GAD7 TOTAL SCORE: 5

## 2023-11-01 ASSESSMENT — PATIENT HEALTH QUESTIONNAIRE - PHQ9
10. IF YOU CHECKED OFF ANY PROBLEMS, HOW DIFFICULT HAVE THESE PROBLEMS MADE IT FOR YOU TO DO YOUR WORK, TAKE CARE OF THINGS AT HOME, OR GET ALONG WITH OTHER PEOPLE: SOMEWHAT DIFFICULT
SUM OF ALL RESPONSES TO PHQ QUESTIONS 1-9: 9
SUM OF ALL RESPONSES TO PHQ QUESTIONS 1-9: 9

## 2023-11-01 ASSESSMENT — PAIN SCALES - GENERAL: PAINLEVEL: NO PAIN (0)

## 2023-11-01 NOTE — PROGRESS NOTES
Assessment & Plan     PCOS (polycystic ovarian syndrome)  Continue, discussed menses will improve with correction/adjustment of thyroid meds  Transitioning from armour to synthroid and cytomel  - metFORMIN (GLUCOPHAGE XR) 500 MG 24 hr tablet; Take 2 tablets (1,000 mg) by mouth 2 times daily    Hypothyroidism due to Hashimoto's thyroiditis  Continue with 100synthroid and cytomel 5 bid  - levothyroxine (SYNTHROID/LEVOTHROID) 100 MCG tablet; Take 1 tablet (100 mcg) by mouth daily    Viral URI with cough  Follow-up if not improving  - benzonatate (TESSALON) 100 MG capsule; Take 1-2 capsules (100-200 mg) by mouth 3 times daily as needed for cough    Provider  Link to [facility] Help Grid :[id number]}    Patient was agreeable to this plan and had no further questions.  There are no Patient Instructions on file for this visit.    No follow-ups on file.    Jovana Lambert MD  [facility] - [facility] [place]   Meredith is a 33 year old, presenting for the following health issues:  Thyroid Problem, PCOS, and Depression        2023     2:48 PM   Additional Questions   Roomed by Rocael Muñoz   Accompanied by Self         2023     2:48 PM   Patient Reported Additional Medications   Patient reports taking the following new medications none       HPI   Clinic Visit  Date of visit: 2023   Chief Complaint:   Chief Complaint   Patient presents with    Thyroid Problem    PCOS    Depression       HPI:   Meredith Gordon is a 33 year old  female who presents to clinic today for follow up  for PCOS .     Menarche: age 33  Menses: irregular and last for about 5 days. Patient's last menstrual period was 2023 (exact date).   Family Planning Chart: No.  Acne: Yes: A day or two before .  Hirsutism (facial hair): No.  Male-pattern hair loss: No.  Elevated serum androgen:   Lab Results   Component Value Date    DHEA 283 2023    TESTOSTTOTAL 25 2023    FT 0.31 2023    JOVITA 1.900 2023    ]  BMI: Body mass index is 35.28 kg/m .   Type 2 DM: No.  Elevated Cholesterol: No.  Mood disorder: Yes: adjustment disorder with depressed moods and anxiety.    Ultrasound: n/a     Gynecologic History:  Last Pap:   Lab Results   Component Value Date    PAP NIL 2016      History of abnormal pap: No.    Obstetric History:   OB History    Para Term  AB Living   0 0 0 0 0 0   SAB IAB Ectopic Multiple Live Births   0 0 0 0 0     Obstetric history significant for:  no complications    Medications:  Acetylcysteine, Nutrient, 600 MG TABS, Take 600 mg by mouth 2 times daily  cabergoline (DOSTINEX) 0.5 MG tablet, Take 1 tablet (0.5 mg) by mouth once a week  levothyroxine (SYNTHROID/LEVOTHROID) 100 MCG tablet, Take 1 tablet (100 mcg) by mouth daily  levothyroxine (SYNTHROID/LEVOTHROID) 88 MCG tablet, Take 1 tablet (88 mcg) by mouth daily  liothyronine (CYTOMEL) 5 MCG tablet, Take 1 tablet (5 mcg) by mouth 2 times daily  sertraline (ZOLOFT) 100 MG tablet, Take 1 Tablet by mouth one time a day.  triamcinolone (KENALOG) 0.1 % external cream, Apply topically 2 times daily  vitamin D3 (CHOLECALCIFEROL) 2000 units (50 mcg) tablet, Take 2 tablets (4,000 Units) by mouth daily  metFORMIN (GLUCOPHAGE XR) 500 MG 24 hr tablet, Take 2 tablets (1,000 mg) by mouth 2 times daily for 90 days    No current facility-administered medications on file prior to visit.      Allergy:  Allergies   Allergen Reactions    Cats Itching     Cat/feline product derivatives       Medical History:  Past Medical History:   Diagnosis Date     novel coronavirus disease (COVID-19) 2021    B12 deficiency 2012    resolved    Hashimoto's disease 2011    Mild scoliosis     Nephrolithiasis      in [facility] (at [facility])    PCOS (polycystic ovarian syndrome)     Recurrent major depressive disorder, in partial remission (H24)        Surgical History:  Past Surgical History:   Procedure Laterality Date    EXAM UNDER  ANESTHESIA RECTUM N/A 8/9/2022    Procedure: EXAM UNDER ANESTHESIA, RECTUM;  Surgeon: Denilson Singh MD;  Location: [facility]    HEMORRHOIDECTOMY EXTERNAL N/A 8/9/2022    Procedure: Hemorrhoidectomy;  Surgeon: Denilson Singh MD;  Location: [facility] OR    MAMMOPLASTY REDUCTION  2015    wisdom teeth         Social History:  Social History    Substance and Sexual Activity      Alcohol use: Yes        Comment: Occasional    History   Smoking Status    Never   Smokeless Tobacco    Never     History   Drug Use Unknown     History   Sexual Activity    Sexual activity: Never     Relationship status is: Never been .        Review of Systems:    GENERAL: No change in weight, sleep or appetite.  Normal energy.  No fever or chills  EYES: Negative for vision changes or eye problems  ENT: No problems with ears, nose or throat.  No difficulty swallowing.  RESP: No coughing, wheezing or shortness of breath  CV: No chest pains or palpitations  GI: No nausea, vomiting,  heartburn, abdominal pain, diarrhea, constipation or change in bowel habits  : No urinary frequency or dysuria, bladder or kidney problems  MUSCULOSKELETAL: No significant muscle or joint pains  NEUROLOGIC: No headaches, numbness, tingling, weakness, problems with balance or coordination  PSYCHIATRIC: No problems with anxiety, depression or mental health  HEME/IMMUNE/ALLERGY: No history of bleeding or clotting problems or anemia.  No allergies or immune system problems  ENDOCRINE: No history of thyroid disease, diabetes or other endocrine disorders  SKIN: No rashes,worrisome lesions or skin problems      Physical Exam:  Vitals:    11/01/23 1456   BP: 110/62   BP Location: Left arm   Patient Position: Sitting   Cuff Size: Adult Large   Pulse: 77   Temp: 97.6  F (36.4  C)   TempSrc: Tympanic   SpO2: 98%   Weight: 96.2 kg (212 lb)     Body mass index is 35.28 kg/m .  GENERAL: healthy, alert and no distress  EYES: Eyes grossly normal to inspection, PERRL and  conjunctivae and sclerae normal  HENT: ear canals and TM's normal, nose and mouth without ulcers or lesions  RESP: lungs clear to auscultation - no rales, rhonchi or wheezes  CV: regular rate and rhythm, normal S1 S2, no S3 or S4, no murmur, click or rub, no peripheral edema and peripheral pulses strong  MS: no gross musculoskeletal defects noted, no edema  PSYCH: mentation appears normal, affect normal/bright       Hypothyroidism Follow-up    Since last visit, patient describes the following symptoms: loose stools  How many servings of fruits and vegetables do you eat daily?  2-3  On average, how many sweetened beverages do you drink each day (Examples: soda, juice, sweet tea, etc.  Do NOT count diet or artificially sweetened beverages)?   0  How many days per week do you exercise enough to make your heart beat faster? 3 or less  How many minutes a day do you exercise enough to make your heart beat faster? 9 or less  How many days per week do you miss taking your medication? 0  Depression Followup  How are you doing with your depression since your last visit? No change. States she has been doing pretty good   Are you having other symptoms that might be associated with depression? No  Have you had a significant life event?  No   Are you feeling anxious or having panic attacks?   No  Do you have any concerns with your use of alcohol or other drugs? No    Social History     Tobacco Use    Smoking status: Never    Smokeless tobacco: Never   Vaping Use    Vaping Use: Never used   Substance Use Topics    Alcohol use: Yes     Comment: Occasional    Drug use: Never         6/26/2023     9:03 AM 8/13/2023     4:51 PM 11/1/2023     2:34 PM   PHQ   PHQ-9 Total Score 10 9 9   Q9: Thoughts of better off dead/self-harm past 2 weeks Not at all Not at all Not at all         12/9/2022    10:46 AM 6/26/2023     9:03 AM 11/1/2023     2:34 PM   ROBBIE-7 SCORE   Total Score  5 (mild anxiety) 5 (mild anxiety)   Total Score 2 5 5          11/1/2023     2:34 PM   Last PHQ-9   1.  Little interest or pleasure in doing things 1   2.  Feeling down, depressed, or hopeless 1   3.  Trouble falling or staying asleep, or sleeping too much 2   4.  Feeling tired or having little energy 2   5.  Poor appetite or overeating 1   6.  Feeling bad about yourself 1   7.  Trouble concentrating 1   8.  Moving slowly or restless 0   Q9: Thoughts of better off dead/self-harm past 2 weeks 0   PHQ-9 Total Score 9         11/1/2023     2:34 PM   ROBBIE-7    1. Feeling nervous, anxious, or on edge 1   2. Not being able to stop or control worrying 1   3. Worrying too much about different things 1   4. Trouble relaxing 1   5. Being so restless that it is hard to sit still 0   6. Becoming easily annoyed or irritable 1   7. Feeling afraid, as if something awful might happen 0   ROBBIE-7 Total Score 5   If you checked any problems, how difficult have they made it for you to do your work, take care of things at home, or get along with other people? Somewhat difficult       Suicide Assessment Five-step Evaluation and Treatment (SAFE-T)      Results for orders placed or performed in visit on 11/01/23 (from the past 24 hour(s))   TSH with free T4 reflex   Result Value Ref Range    TSH 4.40 (H) 0.30 - 4.20 uIU/mL   T4 free   Result Value Ref Range    Free T4 1.10 0.90 - 1.70 ng/dL         Answers submitted by the patient for this visit:  Patient Health Questionnaire (Submitted on 11/1/2023)  If you checked off any problems, how difficult have these problems made it for you to do your work, take care of things at home, or get along with other people?: Somewhat difficult  PHQ9 TOTAL SCORE: 9  ROBBIE-7 (Submitted on 11/1/2023)  ROBBIE 7 TOTAL SCORE: 5

## 2023-11-02 LAB — PROLACTIN SERPL 3RD IS-MCNC: 2 NG/ML (ref 5–23)

## 2023-11-08 DIAGNOSIS — E06.3 HYPOTHYROIDISM DUE TO HASHIMOTO'S THYROIDITIS: ICD-10-CM

## 2023-11-09 RX ORDER — TRIAMCINOLONE ACETONIDE 1 MG/G
CREAM TOPICAL
Qty: 45 G | Refills: 1 | Status: SHIPPED | OUTPATIENT
Start: 2023-11-09

## 2023-11-09 NOTE — TELEPHONE ENCOUNTER
Kenalog      Last Written Prescription Date:  5/3/22  Last Fill Quantity: 45g,   # refills: 1  Last Office Visit: 11/1/23  Future Office visit:       Routing refill request to provider for review/approval because:

## 2023-11-19 DIAGNOSIS — F41.1 GAD (GENERALIZED ANXIETY DISORDER): ICD-10-CM

## 2023-11-19 DIAGNOSIS — F33.0 MILD EPISODE OF RECURRENT MAJOR DEPRESSIVE DISORDER (H): ICD-10-CM

## 2023-11-20 RX ORDER — SERTRALINE HYDROCHLORIDE 100 MG/1
TABLET, FILM COATED ORAL
Qty: 90 TABLET | Refills: 2 | Status: SHIPPED | OUTPATIENT
Start: 2023-11-20 | End: 2024-03-28

## 2023-11-20 NOTE — TELEPHONE ENCOUNTER
Sertraline (ZOLOFT) 100 mg tab      Last Written Prescription Date:  2/9/23  Last Fill Quantity: 90,   # refills: 2  Last Office Visit: 11/1/23  Future Office visit:    Next 5 appointments (look out 90 days)      Feb 08, 2024  9:30 AM  (Arrive by 9:15 AM)  SHORT with Jovana Lambert MD  [facility] [place] ([facility] [place] ) [address]  [place] [address]  [phone]

## 2023-11-29 ENCOUNTER — MYC MEDICAL ADVICE (OUTPATIENT)
Dept: FAMILY MEDICINE | Facility: OTHER | Age: 33
End: 2023-11-29

## 2023-11-29 DIAGNOSIS — J01.90 ACUTE SINUSITIS WITH SYMPTOMS > 10 DAYS: Primary | ICD-10-CM

## 2023-11-29 RX ORDER — AMOXICILLIN 875 MG
875 TABLET ORAL 2 TIMES DAILY
Qty: 14 TABLET | Refills: 0 | Status: SHIPPED | OUTPATIENT
Start: 2023-11-29 | End: 2023-12-06

## 2023-11-29 NOTE — TELEPHONE ENCOUNTER
11/29/2023 1:47 PM  Writer called pt regarding my chart message. Pt reports 6 weeks of URI sx. HA, congestion, productive cough greenish yellow expectorant. Pt denies sx ever improving. Pt requesting PCP on a plan. Pt will come back to the office if needed and/or try abx. PCP to advise. Pt denies fever. Denies any other concerns or sx.     Future Appointments 11/29/2023 - 5/27/2024        Date Visit Type Length Department Provider     12/27/2023  7:30 AM LAB 15 min HC LABORATORY HC LAB    Location Instructions:     From [facility] [place]: Take [address]. Turn left at [address]. Turn left at the first stoplight on [address]. At the first stop sign, take a right onto [address]. The upper level parking lot will be on the left. East Entrance Door number 10.   From [place]: Take [address]. Take a right at [address]. At the first stop sign, take a right onto [address]. The upper level parking lot will be on the left. East Entrance Door number 10.               2/8/2024  9:30 AM SHORT 30 min [facility] FAMILY PRACTICE Jovana Lambert MD    Location Instructions:     From [facility] [place]: Take [address]. Turn left at [address]. Turn left at the first stoplight on [address]. At the first stop sign, take a right onto [address]. The upper level parking lot will be on the left. East Entrance Door number 10.   From [place]: Take [address]. Take a right at [address]. At the first stop sign, take a right onto [address]. The upper level parking lot will be on the left. East Entrance Door number 10.   From [place]: Take [address]. Take the [address] ramp towards [place]. Turn left onto [address]. Take a slight right onto [address]. Turn left at the first stoplight on [address]. At the first stop sign, take a right onto [address]. The upper level parking lot will be on the left. East  Entrance Door number 10.

## 2023-11-29 NOTE — TELEPHONE ENCOUNTER
Pt notified amoxicillin signed.     Advised to call back directly if there are further questions, or if these symptoms fail to improve as anticipated or worsen. Pt agrees to plan and verbalizes understanding.   Conchis Zapata RN

## 2023-12-07 NOTE — TELEPHONE ENCOUNTER
Pt called and is scheduled with covering provider tomorrow.      Pt encouraged in the mean time with any new or worsening symptoms to seek medical care at [facility] to get evaluated.

## 2023-12-08 ENCOUNTER — OFFICE VISIT (OUTPATIENT)
Dept: FAMILY MEDICINE | Facility: OTHER | Age: 33
End: 2023-12-08
Attending: FAMILY MEDICINE
Payer: COMMERCIAL

## 2023-12-08 VITALS
SYSTOLIC BLOOD PRESSURE: 104 MMHG | BODY MASS INDEX: 37.02 KG/M2 | OXYGEN SATURATION: 98 % | HEIGHT: 65 IN | DIASTOLIC BLOOD PRESSURE: 76 MMHG | TEMPERATURE: 97.6 F | RESPIRATION RATE: 16 BRPM | HEART RATE: 74 BPM | WEIGHT: 222.2 LBS

## 2023-12-08 DIAGNOSIS — J01.90 ACUTE SINUSITIS WITH SYMPTOMS > 10 DAYS: Primary | ICD-10-CM

## 2023-12-08 DIAGNOSIS — R05.2 SUBACUTE COUGH: ICD-10-CM

## 2023-12-08 PROCEDURE — 99213 OFFICE O/P EST LOW 20 MIN: CPT | Performed by: FAMILY MEDICINE

## 2023-12-08 RX ORDER — PREDNISONE 20 MG/1
60 TABLET ORAL DAILY
Qty: 15 TABLET | Refills: 0 | Status: SHIPPED | OUTPATIENT
Start: 2023-12-08 | End: 2023-12-13

## 2023-12-08 RX ORDER — DOXYCYCLINE HYCLATE 100 MG
100 TABLET ORAL 2 TIMES DAILY
Qty: 14 TABLET | Refills: 0 | Status: SHIPPED | OUTPATIENT
Start: 2023-12-08 | End: 2023-12-15

## 2023-12-08 RX ORDER — ALBUTEROL SULFATE 90 UG/1
2 AEROSOL, METERED RESPIRATORY (INHALATION) EVERY 4 HOURS PRN
Qty: 18 G | Refills: 0 | Status: SHIPPED | OUTPATIENT
Start: 2023-12-08 | End: 2024-02-08

## 2023-12-08 ASSESSMENT — PAIN SCALES - GENERAL: PAINLEVEL: NO PAIN (0)

## 2023-12-08 NOTE — PROGRESS NOTES
"  Assessment & Plan     Acute sinusitis with symptoms > 10 days  - doxycycline hyclate (VIBRA-TABS) 100 MG tablet; Take 1 tablet (100 mg) by mouth 2 times daily for 7 days  - predniSONE (DELTASONE) 20 MG tablet; Take 3 tablets (60 mg) by mouth daily for 5 days    Subacute cough  - albuterol (PROAIR HFA/PROVENTIL HFA/VENTOLIN HFA) 108 (90 Base) MCG/ACT inhaler; Inhale 2 puffs into the lungs every 4 hours as needed for shortness of breath, wheezing or cough    Also discussed hypertonic nasal saline bid, and can start an OTC allergy med such as Zyrtec.  Can try albuterol if she feels her chest is tight - although seems more sinus at this time.  Follow-up if worsening, not improving as anticipated/discussed, or any new symptoms/concerns.     MARIAM JACKSON,   [facility] - [place] Harper is a 33 year old, presenting for the following health issues:  Cough (Congestion) and URI (Cold)      HPI     Acute Illness  Acute illness concerns: Cold, cough, congestion  Onset/Duration: 10/05/2023 - >2 months  Symptoms:  Fever: No  Chills/Sweats: Yes sweats  Headache (location?): YES  Sinus Pressure: YES popping ears  Conjunctivitis:  No  Ear Pain: YES- pressure  Rhinorrhea: YES  Congestion: YES  Sore Throat: No  Cough: YES-non-productive, productive of clear sputum, productive of green sputum, with shortness of breath, barking  Wheeze: YES  Decreased Appetite: YES  Nausea: No  Vomiting: No  Diarrhea: YES  Dysuria/Freq.: No  Dysuria or Hematuria: No  Fatigue/Achiness: No  Sick/Strep Exposure: YES  Therapies tried and outcome: Finished Amoxicillin treatment on Wednesday. Last Wednesday did covid test and was negative.        Review of Systems   Constitutional:  Negative for fever.   Cardiovascular:  Negative for chest pain.   Gastrointestinal:  Negative for abdominal pain.            Objective    /76   Pulse 74   Temp 97.6  F (36.4  C) (Tympanic)   Resp 16   Ht 1.651 m (5' 5\")   Wt 100.8 kg (222 " lb 3.2 oz)   LMP 11/01/2023 (Exact Date)   SpO2 98%   BMI 36.98 kg/m    Body mass index is 36.98 kg/m .  Physical Exam  Constitutional:       General: She is not in acute distress.     Appearance: Normal appearance.   HENT:      Head: Normocephalic and atraumatic.      Right Ear: Tympanic membrane normal.      Left Ear: Tympanic membrane normal.      Nose: Congestion present.      Comments: Copious thick crusting yellow mucus, R > L  Eyes:      Conjunctiva/sclera: Conjunctivae normal.      Pupils: Pupils are equal, round, and reactive to light.   Cardiovascular:      Rate and Rhythm: Normal rate and regular rhythm.      Heart sounds: Normal heart sounds. No murmur heard.  Pulmonary:      Effort: Pulmonary effort is normal.      Breath sounds: Normal breath sounds.   Musculoskeletal:      Right lower leg: No edema.      Left lower leg: No edema.   Lymphadenopathy:      Cervical: No cervical adenopathy.   Neurological:      Mental Status: She is alert and oriented to person, place, and time.

## 2023-12-11 DIAGNOSIS — E06.3 HYPOTHYROIDISM DUE TO HASHIMOTO'S THYROIDITIS: ICD-10-CM

## 2023-12-11 NOTE — TELEPHONE ENCOUNTER
Disp Refills Start End SARA   liothyronine (CYTOMEL) 5 MCG tablet 180 tablet 0 8/14/2023  No     Last Office Visit: 11/01/2023  Future Office visit:    Next 5 appointments (look out 90 days)      Feb 08, 2024  9:30 AM  (Arrive by 9:15 AM)  SHORT with Jovana Lambert MD  [facility] [place] ([facility] [place] ) [address]  [place] [address]  [phone]             Routing refill request to provider for review/approval because:

## 2023-12-14 RX ORDER — LIOTHYRONINE SODIUM 5 UG/1
5 TABLET ORAL 2 TIMES DAILY
Qty: 180 TABLET | Refills: 1 | Status: SHIPPED | OUTPATIENT
Start: 2023-12-14 | End: 2024-06-17

## 2023-12-14 ASSESSMENT — ENCOUNTER SYMPTOMS
ABDOMINAL PAIN: 0
FEVER: 0

## 2023-12-27 ENCOUNTER — LAB (OUTPATIENT)
Dept: LAB | Facility: OTHER | Age: 33
End: 2023-12-27
Payer: COMMERCIAL

## 2023-12-27 DIAGNOSIS — E06.3 HYPOTHYROIDISM DUE TO HASHIMOTO'S THYROIDITIS: ICD-10-CM

## 2023-12-27 DIAGNOSIS — E22.1 HYPERPROLACTINEMIA (H): ICD-10-CM

## 2023-12-27 LAB
PROLACTIN SERPL 3RD IS-MCNC: 3 NG/ML (ref 5–23)
T4 FREE SERPL-MCNC: 1.03 NG/DL (ref 0.9–1.7)
TSH SERPL DL<=0.005 MIU/L-ACNC: 5.36 UIU/ML (ref 0.3–4.2)

## 2023-12-27 PROCEDURE — 36415 COLL VENOUS BLD VENIPUNCTURE: CPT

## 2023-12-27 PROCEDURE — 84146 ASSAY OF PROLACTIN: CPT

## 2023-12-27 PROCEDURE — 84443 ASSAY THYROID STIM HORMONE: CPT

## 2023-12-27 PROCEDURE — 84439 ASSAY OF FREE THYROXINE: CPT

## 2023-12-28 DIAGNOSIS — E06.3 HYPOTHYROIDISM DUE TO HASHIMOTO'S THYROIDITIS: Primary | ICD-10-CM

## 2023-12-28 RX ORDER — LEVOTHYROXINE SODIUM 112 UG/1
112 TABLET ORAL DAILY
Qty: 30 TABLET | Refills: 1 | Status: SHIPPED | OUTPATIENT
Start: 2023-12-28 | End: 2024-03-28

## 2024-02-07 ASSESSMENT — PATIENT HEALTH QUESTIONNAIRE - PHQ9
SUM OF ALL RESPONSES TO PHQ QUESTIONS 1-9: 9
10. IF YOU CHECKED OFF ANY PROBLEMS, HOW DIFFICULT HAVE THESE PROBLEMS MADE IT FOR YOU TO DO YOUR WORK, TAKE CARE OF THINGS AT HOME, OR GET ALONG WITH OTHER PEOPLE: NOT DIFFICULT AT ALL
SUM OF ALL RESPONSES TO PHQ QUESTIONS 1-9: 9

## 2024-02-07 ASSESSMENT — ANXIETY QUESTIONNAIRES
7. FEELING AFRAID AS IF SOMETHING AWFUL MIGHT HAPPEN: NOT AT ALL
7. FEELING AFRAID AS IF SOMETHING AWFUL MIGHT HAPPEN: NOT AT ALL
GAD7 TOTAL SCORE: 5
8. IF YOU CHECKED OFF ANY PROBLEMS, HOW DIFFICULT HAVE THESE MADE IT FOR YOU TO DO YOUR WORK, TAKE CARE OF THINGS AT HOME, OR GET ALONG WITH OTHER PEOPLE?: NOT DIFFICULT AT ALL
GAD7 TOTAL SCORE: 5
4. TROUBLE RELAXING: SEVERAL DAYS
5. BEING SO RESTLESS THAT IT IS HARD TO SIT STILL: NOT AT ALL
1. FEELING NERVOUS, ANXIOUS, OR ON EDGE: SEVERAL DAYS
GAD7 TOTAL SCORE: 5
IF YOU CHECKED OFF ANY PROBLEMS ON THIS QUESTIONNAIRE, HOW DIFFICULT HAVE THESE PROBLEMS MADE IT FOR YOU TO DO YOUR WORK, TAKE CARE OF THINGS AT HOME, OR GET ALONG WITH OTHER PEOPLE: NOT DIFFICULT AT ALL
2. NOT BEING ABLE TO STOP OR CONTROL WORRYING: SEVERAL DAYS
6. BECOMING EASILY ANNOYED OR IRRITABLE: SEVERAL DAYS
3. WORRYING TOO MUCH ABOUT DIFFERENT THINGS: SEVERAL DAYS

## 2024-02-08 ENCOUNTER — LAB (OUTPATIENT)
Dept: LAB | Facility: OTHER | Age: 34
End: 2024-02-08
Attending: FAMILY MEDICINE
Payer: COMMERCIAL

## 2024-02-08 ENCOUNTER — OFFICE VISIT (OUTPATIENT)
Dept: FAMILY MEDICINE | Facility: OTHER | Age: 34
End: 2024-02-08
Attending: FAMILY MEDICINE
Payer: COMMERCIAL

## 2024-02-08 VITALS
BODY MASS INDEX: 36.28 KG/M2 | TEMPERATURE: 97.2 F | OXYGEN SATURATION: 98 % | WEIGHT: 218 LBS | SYSTOLIC BLOOD PRESSURE: 106 MMHG | DIASTOLIC BLOOD PRESSURE: 73 MMHG | HEART RATE: 93 BPM

## 2024-02-08 DIAGNOSIS — N92.0 MENORRHAGIA WITH REGULAR CYCLE: ICD-10-CM

## 2024-02-08 DIAGNOSIS — E28.2 PCOS (POLYCYSTIC OVARIAN SYNDROME): ICD-10-CM

## 2024-02-08 DIAGNOSIS — E22.1 HYPERPROLACTINEMIA (H): ICD-10-CM

## 2024-02-08 DIAGNOSIS — R14.0 ABDOMINAL BLOATING: ICD-10-CM

## 2024-02-08 DIAGNOSIS — E28.2 PCOS (POLYCYSTIC OVARIAN SYNDROME): Primary | ICD-10-CM

## 2024-02-08 DIAGNOSIS — E06.3 HYPOTHYROIDISM DUE TO HASHIMOTO'S THYROIDITIS: ICD-10-CM

## 2024-02-08 DIAGNOSIS — F51.02 ADJUSTMENT INSOMNIA: ICD-10-CM

## 2024-02-08 DIAGNOSIS — E53.8 VITAMIN B12 DEFICIENCY (NON ANEMIC): ICD-10-CM

## 2024-02-08 LAB
ERYTHROCYTE [DISTWIDTH] IN BLOOD BY AUTOMATED COUNT: 12.2 % (ref 10–15)
EST. AVERAGE GLUCOSE BLD GHB EST-MCNC: 100 MG/DL
HBA1C MFR BLD: 5.1 %
HCT VFR BLD AUTO: 39.6 % (ref 35–47)
HGB BLD-MCNC: 12.9 G/DL (ref 11.7–15.7)
MCH RBC QN AUTO: 28 PG (ref 26.5–33)
MCHC RBC AUTO-ENTMCNC: 32.6 G/DL (ref 31.5–36.5)
MCV RBC AUTO: 86 FL (ref 78–100)
PLATELET # BLD AUTO: 381 10E3/UL (ref 150–450)
RBC # BLD AUTO: 4.6 10E6/UL (ref 3.8–5.2)
TSH SERPL DL<=0.005 MIU/L-ACNC: 2.82 UIU/ML (ref 0.3–4.2)
WBC # BLD AUTO: 7.5 10E3/UL (ref 4–11)

## 2024-02-08 PROCEDURE — 36415 COLL VENOUS BLD VENIPUNCTURE: CPT

## 2024-02-08 PROCEDURE — 82784 ASSAY IGA/IGD/IGG/IGM EACH: CPT | Performed by: FAMILY MEDICINE

## 2024-02-08 PROCEDURE — 99214 OFFICE O/P EST MOD 30 MIN: CPT | Performed by: FAMILY MEDICINE

## 2024-02-08 PROCEDURE — 86364 TISS TRNSGLTMNASE EA IG CLAS: CPT

## 2024-02-08 PROCEDURE — 84146 ASSAY OF PROLACTIN: CPT

## 2024-02-08 PROCEDURE — 83036 HEMOGLOBIN GLYCOSYLATED A1C: CPT

## 2024-02-08 PROCEDURE — 85027 COMPLETE CBC AUTOMATED: CPT

## 2024-02-08 PROCEDURE — 84443 ASSAY THYROID STIM HORMONE: CPT

## 2024-02-08 RX ORDER — METFORMIN HCL 500 MG
1000 TABLET, EXTENDED RELEASE 24 HR ORAL 2 TIMES DAILY
Qty: 360 TABLET | Refills: 3 | Status: SHIPPED | OUTPATIENT
Start: 2024-02-08 | End: 2024-03-28

## 2024-02-08 RX ORDER — TRAZODONE HYDROCHLORIDE 50 MG/1
25-75 TABLET, FILM COATED ORAL AT BEDTIME
Qty: 30 TABLET | Refills: 1 | Status: SHIPPED | OUTPATIENT
Start: 2024-02-08 | End: 2024-03-28

## 2024-02-08 ASSESSMENT — PAIN SCALES - GENERAL: PAINLEVEL: NO PAIN (0)

## 2024-02-08 NOTE — PATIENT INSTRUCTIONS
Rosanna Reynolds  'forgiving ....blah something'    Getting into parasympathetic mode -- deep breathing, laying with legs in the air  Music    The introvert advantage     The body keeps the score

## 2024-02-08 NOTE — PROGRESS NOTES
"  Assessment & Plan     PCOS (polycystic ovarian syndrome)  Cut back on metformin to 3 tablets daily second to bowels  Continue inositol 2000mg bid  Follow-up 6-8 wks  - metFORMIN (GLUCOPHAGE XR) 500 MG 24 hr tablet; Take 2 tablets (1,000 mg) by mouth 2 times daily  - Hemoglobin A1c; Future    Vitamin B12 deficiency (non anemic)  stable    Hypothyroidism due to Hashimoto's thyroiditis  Recheck is better  - TSH with free T4 reflex; Future    Abdominal bloating  She struggling to lose weight, encourage digestive enzymes  Labs today  Follow-up 6-8 wks  - Tissue transglutaminase terrance IgA and IgG; Future  - IgA    Menorrhagia with regular cycle  Not improving yet, follow-up 6-8 wks  - US Pelvic Transabdominal and Transvaginal; Future  - CBC with platelets; Future    Adjustment insomnia  Taking her 2-3 hrs to fall asleep and she is not doing screens ahead of time  - traZODone (DESYREL) 50 MG tablet; Take 0.5-1.5 tablets (25-75 mg) by mouth at bedtime      33 minutes spent by me on the date of the encounter doing chart review, history and exam, documentation and further activities per the note      BMI  Estimated body mass index is 36.28 kg/m  as calculated from the following:    Height as of 12/8/23: 1.651 m (5' 5\").    Weight as of this encounter: 98.9 kg (218 lb).   Weight management plan: Discussed healthy diet and exercise guidelines    Patient was agreeable to this plan and had no further questions.  Patient Instructions   Rosanna Reynolds  'forgiving ....blah something'    Getting into parasympathetic mode -- deep breathing, laying with legs in the air  Music    The introvert advantage     The body keeps the score    No follow-ups on file.    Juan David Harper is a 33 year old, presenting for the following health issues:  Thyroid Problem, PCOS, and Anxiety        2/8/2024     9:22 AM   Additional Questions   Roomed by Rocael Muñoz   Accompanied by Self         2/8/2024     9:22 AM   Patient Reported Additional " Medications   Patient reports taking the following new medications none     HPI   Clinic Visit  Date of visit: 2024   Chief Complaint:   Chief Complaint   Patient presents with    Thyroid Problem    PCOS    Anxiety       HPI:   Meredith Gordon is a 33 year old  female who presents to clinic today for follow up  for PCOS     Menarche: age 33  Menses: irregular  and length: 5 days. Patient's last menstrual period was 2024.   Family Planning Chart: No.  Acne: Yes: In the week before    Hirsutism (facial hair): No.  Male-pattern hair loss: No.  Elevated serum androgen:   Lab Results   Component Value Date    DHEA 283 2023    TESTOSTTOTAL 25 2023    FT 0.31 2023    JOVITA 1.900 2023   ]  BMI: Body mass index is 36.28 kg/m .   Type 2 DM: No.  Elevated Cholesterol: No.  Mood disorder: Yes: adjustment disorder with depressed moods and anxiety.    Gynecologic History:  Last Pap:   Lab Results   Component Value Date    PAP NIL 2016      History of abnormal pap: No.    Obstetric History:   OB History    Para Term  AB Living   0 0 0 0 0 0   SAB IAB Ectopic Multiple Live Births   0 0 0 0 0     Obstetric history significant for:  no complications    Medications:  Acetylcysteine, Nutrient, 600 MG TABS, Take 600 mg by mouth 2 times daily  cabergoline (DOSTINEX) 0.5 MG tablet, Take 1 tablet (0.5 mg) by mouth once a week  levothyroxine (SYNTHROID/LEVOTHROID) 112 MCG tablet, Take 1 tablet (112 mcg) by mouth daily  liothyronine (CYTOMEL) 5 MCG tablet, Take 1 Tablet by mouth two times a day.  metFORMIN (GLUCOPHAGE XR) 500 MG 24 hr tablet, Take 2 tablets (1,000 mg) by mouth 2 times daily  sertraline (ZOLOFT) 100 MG tablet, Take 1 Tablet by mouth one time a day.  triamcinolone (KENALOG) 0.1 % external cream, Apply  topically 2 (two) times a day.  vitamin D3 (CHOLECALCIFEROL) 2000 units (50 mcg) tablet, Take 2 tablets (4,000 Units) by mouth daily  albuterol (PROAIR HFA/PROVENTIL  HFA/VENTOLIN HFA) 108 (90 Base) MCG/ACT inhaler, Inhale 2 puffs into the lungs every 4 hours as needed for shortness of breath, wheezing or cough  benzonatate (TESSALON) 100 MG capsule, Take 1-2 capsules (100-200 mg) by mouth 3 times daily as needed for cough    No current facility-administered medications on file prior to visit.      Allergy:  Allergies   Allergen Reactions    Cats Itching     Cat/feline product derivatives       Medical History:  Past Medical History:   Diagnosis Date    2019 novel coronavirus disease (COVID-19) 08/2021    B12 deficiency 06/25/2012    resolved    Hashimoto's disease 09/02/2011    Mild scoliosis 2022    Nephrolithiasis     2011 in [facility] ([facility])    PCOS (polycystic ovarian syndrome)     Recurrent major depressive disorder, in partial remission (H24)        Surgical History:  Past Surgical History:   Procedure Laterality Date    EXAM UNDER ANESTHESIA RECTUM N/A 8/9/2022    Procedure: EXAM UNDER ANESTHESIA, RECTUM;  Surgeon: Denilson Singh MD;  Location: [facility]    HEMORRHOIDECTOMY EXTERNAL N/A 8/9/2022    Procedure: Hemorrhoidectomy;  Surgeon: Denilson Singh MD;  Location: [facility]    MAMMOPLASTY REDUCTION  2015    wisdom teeth         Social History:  Social History    Substance and Sexual Activity      Alcohol use: Yes        Comment: Occasional    History   Smoking Status    Never   Smokeless Tobacco    Never     History   Drug Use Unknown     History   Sexual Activity    Sexual activity: Never     Relationship status is: Never been .      Review of Systems:    GENERAL: No change in weight, sleep or appetite.  Normal energy.  No fever or chills  EYES: Negative for vision changes or eye problems  ENT: No problems with ears, nose or throat.  No difficulty swallowing.  RESP: No coughing, wheezing or shortness of breath  CV: No chest pains or palpitations  GI: No nausea, vomiting,  heartburn, abdominal pain, diarrhea, constipation or change in bowel habits  : No  urinary frequency or dysuria, bladder or kidney problems  MUSCULOSKELETAL: No significant muscle or joint pains  NEUROLOGIC: No headaches, numbness, tingling, weakness, problems with balance or coordination  PSYCHIATRIC: No problems with anxiety, depression or mental health  HEME/IMMUNE/ALLERGY: No history of bleeding or clotting problems or anemia.  No allergies or immune system problems  ENDOCRINE: No history of thyroid disease, diabetes or other endocrine disorders  SKIN: No rashes,worrisome lesions or skin problems    [id number]}  Physical Exam     Vital signs:  /73 (BP Location: Left arm, Patient Position: Sitting, Cuff Size: Adult Large)   Pulse 93   Temp 97.2  F (36.2  C) (Tympanic)   Wt 98.9 kg (218 lb)   LMP 02/04/2024   SpO2 98%   BMI 36.28 kg/m      GENERAL: alert and no distress  NECK: no adenopathy, no asymmetry, masses, or scars  RESP: lungs clear to auscultation - no rales, rhonchi or wheezes  CV: regular rate and rhythm, normal S1 S2, no S3 or S4, no murmur, click or rub, no peripheral edema  ABDOMEN: soft, nontender, no hepatosplenomegaly, no masses and bowel sounds normal  MS: no gross musculoskeletal defects noted, no edema  PSYCH: mentation appears normal, affect normal/bright  PSYCH: tearful        Anxiety Follow-Up  How are you doing with your anxiety since your last visit? No change  Are you having other symptoms that might be associated with anxiety? No  Have you had a significant life event? No   Are you feeling depressed? Yes:     Do you have any concerns with your use of alcohol or other drugs? No    Social History     Tobacco Use    Smoking status: Never    Smokeless tobacco: Never   Vaping Use    Vaping Use: Never used   Substance Use Topics    Alcohol use: Yes     Comment: Occasional    Drug use: Never         6/26/2023     9:03 AM 11/1/2023     2:34 PM 2/7/2024     5:37 PM   ROBBIE-7 SCORE   Total Score 5 (mild anxiety) 5 (mild anxiety) 5 (mild anxiety)   Total Score 5 5 5          8/13/2023     4:51 PM 11/1/2023     2:34 PM 2/7/2024     5:37 PM   PHQ   PHQ-9 Total Score 9 9 9   Q9: Thoughts of better off dead/self-harm past 2 weeks Not at all Not at all Not at all         2/7/2024     5:37 PM   Last PHQ-9   1.  Little interest or pleasure in doing things 1   2.  Feeling down, depressed, or hopeless 1   3.  Trouble falling or staying asleep, or sleeping too much 2   4.  Feeling tired or having little energy 2   5.  Poor appetite or overeating 1   6.  Feeling bad about yourself 1   7.  Trouble concentrating 1   8.  Moving slowly or restless 0   Q9: Thoughts of better off dead/self-harm past 2 weeks 0   PHQ-9 Total Score 9         2/7/2024     5:37 PM   ROBBIE-7    1. Feeling nervous, anxious, or on edge 1   2. Not being able to stop or control worrying 1   3. Worrying too much about different things 1   4. Trouble relaxing 1   5. Being so restless that it is hard to sit still 0   6. Becoming easily annoyed or irritable 1   7. Feeling afraid, as if something awful might happen 0   ROBBIE-7 Total Score 5   If you checked any problems, how difficult have they made it for you to do your work, take care of things at home, or get along with other people? Not difficult at all     Hypothyroidism Follow-up    Since last visit, patient describes the following symptoms: weight gain of 5-10 lbs and loose stools  How many servings of fruits and vegetables do you eat daily?  2-3  On average, how many sweetened beverages do you drink each day (Examples: soda, juice, sweet tea, etc.  Do NOT count diet or artificially sweetened beverages)?   0  How many days per week do you exercise enough to make your heart beat faster? 3 or less  How many minutes a day do you exercise enough to make your heart beat faster? 9 or less  How many days per week do you miss taking your medication? 0      Signed Electronically by: Jovana Lambert MD    Answers submitted by the patient for this visit:  Patient Health Questionnaire  (Submitted on 2/7/2024)  If you checked off any problems, how difficult have these problems made it for you to do your work, take care of things at home, or get along with other people?: Not difficult at all  PHQ9 TOTAL SCORE: 9  ROBBIE-7 (Submitted on 2/7/2024)  ROBBIE 7 TOTAL SCORE: 5

## 2024-02-09 LAB
IGA SERPL-MCNC: 143 MG/DL (ref 84–499)
PROLACTIN SERPL 3RD IS-MCNC: 15 NG/ML (ref 5–23)

## 2024-02-10 LAB
TTG IGA SER-ACNC: 0.3 U/ML
TTG IGG SER-ACNC: 0.7 U/ML

## 2024-02-15 ENCOUNTER — HOSPITAL ENCOUNTER (OUTPATIENT)
Dept: ULTRASOUND IMAGING | Facility: HOSPITAL | Age: 34
Discharge: HOME | End: 2024-02-15
Attending: FAMILY MEDICINE | Admitting: FAMILY MEDICINE
Payer: COMMERCIAL

## 2024-02-15 DIAGNOSIS — N92.0 MENORRHAGIA WITH REGULAR CYCLE: ICD-10-CM

## 2024-02-15 PROCEDURE — 76856 US EXAM PELVIC COMPLETE: CPT

## 2024-02-15 PROCEDURE — 76830 TRANSVAGINAL US NON-OB: CPT

## 2024-03-28 ENCOUNTER — OFFICE VISIT (OUTPATIENT)
Dept: FAMILY MEDICINE | Facility: OTHER | Age: 34
End: 2024-03-28
Attending: FAMILY MEDICINE
Payer: COMMERCIAL

## 2024-03-28 VITALS
HEART RATE: 73 BPM | DIASTOLIC BLOOD PRESSURE: 78 MMHG | WEIGHT: 220 LBS | HEIGHT: 65 IN | RESPIRATION RATE: 16 BRPM | SYSTOLIC BLOOD PRESSURE: 124 MMHG | BODY MASS INDEX: 36.65 KG/M2 | TEMPERATURE: 98.2 F | OXYGEN SATURATION: 97 %

## 2024-03-28 DIAGNOSIS — F33.0 MILD EPISODE OF RECURRENT MAJOR DEPRESSIVE DISORDER (H): ICD-10-CM

## 2024-03-28 DIAGNOSIS — F51.02 ADJUSTMENT INSOMNIA: ICD-10-CM

## 2024-03-28 DIAGNOSIS — F41.1 GAD (GENERALIZED ANXIETY DISORDER): Primary | ICD-10-CM

## 2024-03-28 DIAGNOSIS — E28.2 PCOS (POLYCYSTIC OVARIAN SYNDROME): ICD-10-CM

## 2024-03-28 DIAGNOSIS — E06.3 HYPOTHYROIDISM DUE TO HASHIMOTO'S THYROIDITIS: ICD-10-CM

## 2024-03-28 LAB — TSH SERPL DL<=0.005 MIU/L-ACNC: 3.88 UIU/ML (ref 0.3–4.2)

## 2024-03-28 PROCEDURE — 84481 FREE ASSAY (FT-3): CPT | Performed by: FAMILY MEDICINE

## 2024-03-28 PROCEDURE — 84443 ASSAY THYROID STIM HORMONE: CPT | Performed by: FAMILY MEDICINE

## 2024-03-28 PROCEDURE — 36415 COLL VENOUS BLD VENIPUNCTURE: CPT | Performed by: FAMILY MEDICINE

## 2024-03-28 PROCEDURE — 99214 OFFICE O/P EST MOD 30 MIN: CPT | Performed by: FAMILY MEDICINE

## 2024-03-28 RX ORDER — LEVOTHYROXINE SODIUM 112 UG/1
112 TABLET ORAL DAILY
Qty: 30 TABLET | Refills: 1 | Status: SHIPPED | OUTPATIENT
Start: 2024-03-28 | End: 2024-05-09

## 2024-03-28 RX ORDER — FLUOXETINE 10 MG/1
10 CAPSULE ORAL DAILY
Qty: 80 CAPSULE | Refills: 1 | Status: SHIPPED | OUTPATIENT
Start: 2024-03-28 | End: 2024-05-30

## 2024-03-28 RX ORDER — TRAZODONE HYDROCHLORIDE 50 MG/1
50 TABLET, FILM COATED ORAL AT BEDTIME
Qty: 90 TABLET | Refills: 1 | Status: SHIPPED | OUTPATIENT
Start: 2024-03-28 | End: 2024-09-30

## 2024-03-28 RX ORDER — METFORMIN HCL 500 MG
1000 TABLET, EXTENDED RELEASE 24 HR ORAL
COMMUNITY
Start: 2024-03-28 | End: 2024-05-09

## 2024-03-28 RX ORDER — FLUOXETINE 10 MG/1
10 CAPSULE ORAL DAILY
Qty: 60 CAPSULE | Refills: 1 | Status: SHIPPED | OUTPATIENT
Start: 2024-03-28 | End: 2024-03-28

## 2024-03-28 ASSESSMENT — PATIENT HEALTH QUESTIONNAIRE - PHQ9: SUM OF ALL RESPONSES TO PHQ QUESTIONS 1-9: 6

## 2024-03-28 ASSESSMENT — PAIN SCALES - GENERAL: PAINLEVEL: NO PAIN (0)

## 2024-03-28 NOTE — PROGRESS NOTES
Assessment & Plan     Hypothyroidism due to Hashimoto's thyroiditis  Labs pending  - levothyroxine (SYNTHROID/LEVOTHROID) 112 MCG tablet; Take 1 tablet (112 mcg) by mouth daily  - TSH with free T4 reflex; Future  - T3, Free; Future  - T3, total; Future    PCOS (polycystic ovarian syndrome)  Still having some diarrhea, will cut back to 2 tablets daily  Follow-up 5-6 wks  - metFORMIN (GLUCOPHAGE XR) 500 MG 24 hr tablet; Take 2 tablets (1,000 mg) by mouth daily (with dinner)    ROBBIE (generalized anxiety disorder)  Struggling with weight gain, zoloft can contribute in some, wean off and try prozac instead    Mild episode of recurrent major depressive disorder (H24)  Replace zoloft with prozac, follow-up 5-6 wks  - FLUoxetine (PROZAC) 10 MG capsule; Take 1 capsule (10 mg) by mouth daily For one week and then increase to 20mg daily for 2 weeks and increase to 30mg daily    Adjustment insomnia  - traZODone (DESYREL) 50 MG tablet; Take 1 tablet (50 mg) by mouth at bedtime    30 minutes spent by me on the date of the encounter doing chart review, history and exam, documentation and further activities per the note    Patient was agreeable to this plan and had no further questions.  Patient Instructions   The Glucose Revolution  by Grace Rosenthal  (glucose goddess)  The Obesity Code by Dr Angel Dimas  (web -- you tube)  Fast Like a Girl  by Dr Irma Segura  (youtube)      1st week, change zoloft to 1/2 tablet daily  2nd week, take zoloft 1/2 tablet every other day while starting prozac 10mg daily  3rd and 4th week, stop zoloft and increase prozac to 20mg for 2 weeks  5th week, increase prozac to 30mg     No follow-ups on file.    Juan David Harper is a 34 year old, presenting for the following health issues:  Insomnia and PCOS        3/28/2024    11:00 AM   Additional Questions   Roomed by damon craft   Accompanied by none         3/28/2024    11:00 AM   Patient Reported Additional Medications   Patient reports taking the  following new medications none     HPI   Clinic Visit  Date of visit: 3/28/2024   Chief Complaint:   Chief Complaint   Patient presents with    Insomnia    PCOS       HPI:   Meredith Gordon is a 34 year old  female who presents to clinic today for PCOS   & Insomnia, & Headached  Menarche: age 14  Menses: irregular. Normal cycle 24 days. Had two periods within a week of each other last period. Heaving at start. Fill the menstrual cup every two hours first one to days   Patient's last menstrual period was 2024.   Family Planning Chart: No.  Acne: Yes at period time  Hirsutism (facial hair): No.  Male-pattern hair loss: No.  Elevated serum androgen:   Lab Results   Component Value Date    DHEA 283 2023    TESTOSTTOTAL 25 2023    FT 0.31 2023    JOVITA 1.900 2023   ]  BMI: Body mass index is 36.61 kg/m .   Type 2 DM: No.  Elevated Cholesterol: No.  Mood disorder: Yes: depression & anxiety    Gynecologic History:  Last Pap:   Lab Results   Component Value Date    PAP NIL 2016      History of abnormal pap: No.    Obstetric History:   OB History    Para Term  AB Living   0 0 0 0 0 0   SAB IAB Ectopic Multiple Live Births   0 0 0 0 0     Obstetric history significant for:  none    Medications:  Acetylcysteine, Nutrient, 600 MG TABS, Take 600 mg by mouth 2 times daily  cabergoline (DOSTINEX) 0.5 MG tablet, Take 1 tablet (0.5 mg) by mouth once a week  levothyroxine (SYNTHROID/LEVOTHROID) 112 MCG tablet, Take 1 tablet (112 mcg) by mouth daily  liothyronine (CYTOMEL) 5 MCG tablet, Take 1 Tablet by mouth two times a day.  metFORMIN (GLUCOPHAGE XR) 500 MG 24 hr tablet, Take 2 tablets (1,000 mg) by mouth 2 times daily  sertraline (ZOLOFT) 100 MG tablet, Take 1 Tablet by mouth one time a day.  traZODone (DESYREL) 50 MG tablet, Take 0.5-1.5 tablets (25-75 mg) by mouth at bedtime  triamcinolone (KENALOG) 0.1 % external cream, Apply  topically 2 (two) times a day.  vitamin D3  (CHOLECALCIFEROL) 2000 units (50 mcg) tablet, Take 2 tablets (4,000 Units) by mouth daily    No current facility-administered medications on file prior to visit.      Allergy:  Allergies   Allergen Reactions    Cats Itching     Cat/feline product derivatives    Dogs Angioedema       Medical History:  Past Medical History:   Diagnosis Date    2019 novel coronavirus disease (COVID-19) 08/2021    B12 deficiency 06/25/2012    resolved    Hashimoto's disease 09/02/2011    Mild scoliosis 2022    Nephrolithiasis     2011 in [facility] ([facility])    PCOS (polycystic ovarian syndrome)     Recurrent major depressive disorder, in partial remission (H24)        Surgical History:  Past Surgical History:   Procedure Laterality Date    EXAM UNDER ANESTHESIA RECTUM N/A 8/9/2022    Procedure: EXAM UNDER ANESTHESIA, RECTUM;  Surgeon: Denilson Singh MD;  Location: [facility]    HEMORRHOIDECTOMY EXTERNAL N/A 8/9/2022    Procedure: Hemorrhoidectomy;  Surgeon: Denilson Singh MD;  Location: [facility]    MAMMOPLASTY REDUCTION  2015    wisdom teeth         Social History:  Social History    Substance and Sexual Activity      Alcohol use: Yes        Comment: Occasional    History   Smoking Status    Never   Smokeless Tobacco    Never     History   Drug Use Unknown     History   Sexual Activity    Sexual activity: Never     Relationship status is: Never been .      Review of Systems:    GENERAL: No change in weight, sleep or appetite.  Normal energy.  No fever or chills  EYES: Negative for vision changes or eye problems  ENT: No problems with ears, nose or throat.  No difficulty swallowing.  RESP: No coughing, wheezing or shortness of breath  CV: No chest pains or palpitations  GI: No nausea, vomiting,  heartburn, abdominal pain, diarrhea, constipation or change in bowel habits  : No urinary frequency or dysuria, bladder or kidney problems  MUSCULOSKELETAL: No significant muscle or joint pains  NEUROLOGIC: No headaches, numbness,  "tingling, weakness, problems with balance or coordination  PSYCHIATRIC: No problems with anxiety, depression or mental health  HEME/IMMUNE/ALLERGY: No history of bleeding or clotting problems or anemia.  No allergies or immune system problems  ENDOCRINE: No history of thyroid disease, diabetes or other endocrine disorders  SKIN: No rashes,worrisome lesions or skin problems      Review of Systems  Constitutional, neuro, ENT, endocrine, pulmonary, cardiac, gastrointestinal, genitourinary, musculoskeletal, integument and psychiatric systems are negative, except as otherwise noted.      Objective    /78 (BP Location: Left arm, Patient Position: Sitting, Cuff Size: Adult Regular)   Pulse 73   Temp 98.2  F (36.8  C)   Resp 16   Ht 1.651 m (5' 5\")   Wt 99.8 kg (220 lb)   LMP 02/04/2024   SpO2 97%   BMI 36.61 kg/m    Body mass index is 36.61 kg/m .  Physical Exam   GENERAL: alert and no distress  RESP: lungs clear to auscultation - no rales, rhonchi or wheezes  CV: regular rate and rhythm, normal S1 S2, no S3 or S4, no murmur, click or rub, no peripheral edema  MS: no gross musculoskeletal defects noted, no edema  PSYCH: mentation appears normal, affect normal/bright    Results for orders placed or performed in visit on 03/28/24   TSH with free T4 reflex     Status: Normal   Result Value Ref Range    TSH 3.88 0.30 - 4.20 uIU/mL           Signed Electronically by: Jovana Lambert MD    "

## 2024-03-28 NOTE — PATIENT INSTRUCTIONS
The Glucose Revolution  by Grace Rosenthal  (glucose goddess)  The Obesity Code by Dr Angel Dimas  (web -- you tube)  Fast Like a Girl  by Dr Irma Segura  (youtube)      1st week, change zoloft to 1/2 tablet daily  2nd week, take zoloft 1/2 tablet every other day while starting prozac 10mg daily  3rd and 4th week, stop zoloft and increase prozac to 20mg for 2 weeks  5th week, increase prozac to 30mg

## 2024-03-29 LAB
T3 SERPL-MCNC: 134 NG/DL (ref 85–202)
T3FREE SERPL-MCNC: 3.3 PG/ML (ref 2–4.4)

## 2024-03-29 RX ORDER — LEVOTHYROXINE SODIUM 125 UG/1
125 TABLET ORAL DAILY
Qty: 30 TABLET | Refills: 1 | Status: SHIPPED | OUTPATIENT
Start: 2024-03-29 | End: 2024-06-04

## 2024-04-15 ENCOUNTER — TELEPHONE (OUTPATIENT)
Dept: FAMILY MEDICINE | Facility: OTHER | Age: 34
End: 2024-04-15

## 2024-04-15 ENCOUNTER — TRANSFERRED RECORDS (OUTPATIENT)
Dept: HEALTH INFORMATION MANAGEMENT | Facility: CLINIC | Age: 34
End: 2024-04-15

## 2024-04-15 DIAGNOSIS — H47.10 PAPILLEDEMA OF BOTH EYES: Primary | ICD-10-CM

## 2024-04-15 NOTE — TELEPHONE ENCOUNTER
Dr. So, [facility]    Would like your recommendation to order:  Diamox, 500 mg, BID  Intercranial HTN returning after weaning off drug recently  Office will fax visit notes from Alex Singh: [phone]    Pended to PCP to review & advise

## 2024-04-15 NOTE — TELEPHONE ENCOUNTER
Writer spoke with Nurse @ Dr. So's office  She will confirm with Dr. So if a Provider to Provider consult is needed or can writer send a med note to PCP with the medication signature for PCP to review  Nurse will call back to the CT2 Nurse line with update

## 2024-04-15 NOTE — TELEPHONE ENCOUNTER
4:00 PM    Reason for Call: Phone Call    Description: [facility] needing to speak to Dr Lambert nurse regarding this patient. Dr Raegan Amin wanting to speak to Dr Lambert to have her go on Diamox medication.    Phone [phone]

## 2024-04-16 NOTE — CONFIDENTIAL NOTE
Returned call to the office.  LM to return call to the office.   Writer attempted to call Tech back, clinic is closed until 9 am. Will try back later.

## 2024-04-28 ENCOUNTER — MYC MEDICAL ADVICE (OUTPATIENT)
Dept: FAMILY MEDICINE | Facility: OTHER | Age: 34
End: 2024-04-28

## 2024-04-28 ASSESSMENT — ANXIETY QUESTIONNAIRES
6. BECOMING EASILY ANNOYED OR IRRITABLE: SEVERAL DAYS
4. TROUBLE RELAXING: MORE THAN HALF THE DAYS
GAD7 TOTAL SCORE: 11
2. NOT BEING ABLE TO STOP OR CONTROL WORRYING: MORE THAN HALF THE DAYS
5. BEING SO RESTLESS THAT IT IS HARD TO SIT STILL: SEVERAL DAYS
IF YOU CHECKED OFF ANY PROBLEMS ON THIS QUESTIONNAIRE, HOW DIFFICULT HAVE THESE PROBLEMS MADE IT FOR YOU TO DO YOUR WORK, TAKE CARE OF THINGS AT HOME, OR GET ALONG WITH OTHER PEOPLE: SOMEWHAT DIFFICULT
7. FEELING AFRAID AS IF SOMETHING AWFUL MIGHT HAPPEN: SEVERAL DAYS
8. IF YOU CHECKED OFF ANY PROBLEMS, HOW DIFFICULT HAVE THESE MADE IT FOR YOU TO DO YOUR WORK, TAKE CARE OF THINGS AT HOME, OR GET ALONG WITH OTHER PEOPLE?: SOMEWHAT DIFFICULT
3. WORRYING TOO MUCH ABOUT DIFFERENT THINGS: MORE THAN HALF THE DAYS
7. FEELING AFRAID AS IF SOMETHING AWFUL MIGHT HAPPEN: SEVERAL DAYS
1. FEELING NERVOUS, ANXIOUS, OR ON EDGE: MORE THAN HALF THE DAYS

## 2024-04-28 ASSESSMENT — PATIENT HEALTH QUESTIONNAIRE - PHQ9
SUM OF ALL RESPONSES TO PHQ QUESTIONS 1-9: 7
10. IF YOU CHECKED OFF ANY PROBLEMS, HOW DIFFICULT HAVE THESE PROBLEMS MADE IT FOR YOU TO DO YOUR WORK, TAKE CARE OF THINGS AT HOME, OR GET ALONG WITH OTHER PEOPLE: NOT DIFFICULT AT ALL
SUM OF ALL RESPONSES TO PHQ QUESTIONS 1-9: 7

## 2024-05-09 ENCOUNTER — OFFICE VISIT (OUTPATIENT)
Dept: FAMILY MEDICINE | Facility: OTHER | Age: 34
End: 2024-05-09
Attending: FAMILY MEDICINE
Payer: COMMERCIAL

## 2024-05-09 VITALS
RESPIRATION RATE: 17 BRPM | TEMPERATURE: 98.6 F | DIASTOLIC BLOOD PRESSURE: 70 MMHG | HEIGHT: 65 IN | BODY MASS INDEX: 36.29 KG/M2 | SYSTOLIC BLOOD PRESSURE: 120 MMHG | OXYGEN SATURATION: 98 % | WEIGHT: 217.8 LBS | HEART RATE: 78 BPM

## 2024-05-09 DIAGNOSIS — E88.819 INSULIN RESISTANCE: Primary | ICD-10-CM

## 2024-05-09 DIAGNOSIS — K59.1 FUNCTIONAL DIARRHEA: ICD-10-CM

## 2024-05-09 DIAGNOSIS — F33.0 MILD EPISODE OF RECURRENT MAJOR DEPRESSIVE DISORDER (H): ICD-10-CM

## 2024-05-09 DIAGNOSIS — E06.3 HYPOTHYROIDISM DUE TO HASHIMOTO'S THYROIDITIS: ICD-10-CM

## 2024-05-09 LAB — TSH SERPL DL<=0.005 MIU/L-ACNC: 0.83 UIU/ML (ref 0.3–4.2)

## 2024-05-09 PROCEDURE — 99214 OFFICE O/P EST MOD 30 MIN: CPT | Performed by: FAMILY MEDICINE

## 2024-05-09 PROCEDURE — 36415 COLL VENOUS BLD VENIPUNCTURE: CPT | Performed by: FAMILY MEDICINE

## 2024-05-09 PROCEDURE — 84443 ASSAY THYROID STIM HORMONE: CPT | Performed by: FAMILY MEDICINE

## 2024-05-09 RX ORDER — INOSITOL
2000 POWDER (GRAM) MISCELLANEOUS 2 TIMES DAILY
COMMUNITY
Start: 2024-05-09

## 2024-05-09 ASSESSMENT — PAIN SCALES - GENERAL: PAINLEVEL: NO PAIN (0)

## 2024-05-09 ASSESSMENT — PATIENT HEALTH QUESTIONNAIRE - PHQ9
10. IF YOU CHECKED OFF ANY PROBLEMS, HOW DIFFICULT HAVE THESE PROBLEMS MADE IT FOR YOU TO DO YOUR WORK, TAKE CARE OF THINGS AT HOME, OR GET ALONG WITH OTHER PEOPLE: SOMEWHAT DIFFICULT
SUM OF ALL RESPONSES TO PHQ QUESTIONS 1-9: 4
SUM OF ALL RESPONSES TO PHQ QUESTIONS 1-9: 4

## 2024-05-09 ASSESSMENT — ENCOUNTER SYMPTOMS: NERVOUS/ANXIOUS: 1

## 2024-05-09 NOTE — PROGRESS NOTES
Assessment & Plan     Insulin resistance  Repeating test next month  Stop metformin second to diarrhea side effects  - Glucose; Future  - Insulin level; Future  - Non-gestational glucose tolerance testing, 2 hour; Future  - Inositol POWD; 2,000 mg 2 times daily    Hypothyroidism due to Hashimoto's thyroiditis  stable  - TSH with free T4 reflex    Functional diarrhea  If not improved with stopping metformin, will refer to gen surg    Mild episode of recurrent major depressive disorder (H24)  If not improved by end of month, will increase or switch to cymbalta    Patient was agreeable to this plan and had no further questions.  Patient Instructions   The whole 30 plan    No follow-ups on file.    Juan David Harper is a 34 year old, presenting for the following health issues:  Depression and Anxiety        5/9/2024    12:08 PM   Additional Questions   Roomed by Savannah Cha LPN, CCP, MHP   Accompanied by self     Anxiety       Anxiety   How are you doing with your anxiety since your last visit? No change  Are you having other symptoms that might be associated with anxiety? Yes:  extreme stress, restless  Have you had a significant life event? No   Are you feeling depressed? No  Do you have any concerns with your use of alcohol or other drugs? No    Social History     Tobacco Use    Smoking status: Never    Smokeless tobacco: Never   Vaping Use    Vaping status: Never Used   Substance Use Topics    Alcohol use: Yes     Comment: Occasional    Drug use: Never         11/1/2023     2:34 PM 2/7/2024     5:37 PM 4/28/2024     2:14 PM   ROBBIE-7 SCORE   Total Score 5 (mild anxiety) 5 (mild anxiety) 11 (moderate anxiety)   Total Score 5 5 11         3/28/2024    11:03 AM 4/28/2024     2:13 PM 5/9/2024     9:55 AM   PHQ   PHQ-9 Total Score 6 7 4   Q9: Thoughts of better off dead/self-harm past 2 weeks Not at all Not at all Not at all     Hypothyroidism Follow-up    Since last visit, patient describes the following symptoms:  "loose stools, anxiety, and fatigue  How many servings of fruits and vegetables do you eat daily?  2-3  On average, how many sweetened beverages do you drink each day (Examples: soda, juice, sweet tea, etc.  Do NOT count diet or artificially sweetened beverages)?   0  How many days per week do you exercise enough to make your heart beat faster? 5  How many minutes a day do you exercise enough to make your heart beat faster? 30 - 60  How many days per week do you miss taking your medication? 0      Review of Systems  Constitutional, neuro, ENT, endocrine, pulmonary, cardiac, gastrointestinal, genitourinary, musculoskeletal, integument and psychiatric systems are negative, except as otherwise noted.      Objective    /70 (BP Location: Left arm, Patient Position: Sitting, Cuff Size: Adult Regular)   Pulse 78   Temp 98.6  F (37  C) (Tympanic)   Resp 17   Ht 1.651 m (5' 5\")   Wt 98.8 kg (217 lb 12.8 oz)   LMP 04/24/2024 (Exact Date)   SpO2 98%   BMI 36.24 kg/m    Body mass index is 36.24 kg/m .      Physical Exam     GENERAL: alert and no distress  RESP: lungs clear to auscultation - no rales, rhonchi or wheezes  CV: regular rate and rhythm, normal S1 S2, no S3 or S4, no murmur, click or rub, no peripheral edema  MS: no gross musculoskeletal defects noted, no edema  PSYCH: mentation appears normal, affect normal/bright  Results for orders placed or performed in visit on 05/09/24   TSH with free T4 reflex     Status: Normal   Result Value Ref Range    TSH 0.83 0.30 - 4.20 uIU/mL           Signed Electronically by: Jovana Lambert MD    Answers submitted by the patient for this visit:  Patient Health Questionnaire (Submitted on 5/9/2024)  If you checked off any problems, how difficult have these problems made it for you to do your work, take care of things at home, or get along with other people?: Somewhat difficult  PHQ9 TOTAL SCORE: 4    "

## 2024-05-30 ENCOUNTER — E-VISIT (OUTPATIENT)
Dept: FAMILY MEDICINE | Facility: OTHER | Age: 34
End: 2024-05-30
Attending: FAMILY MEDICINE
Payer: COMMERCIAL

## 2024-05-30 DIAGNOSIS — F33.0 MILD EPISODE OF RECURRENT MAJOR DEPRESSIVE DISORDER (H): ICD-10-CM

## 2024-05-30 PROCEDURE — 99421 OL DIG E/M SVC 5-10 MIN: CPT | Performed by: FAMILY MEDICINE

## 2024-05-30 RX ORDER — FLUOXETINE 10 MG/1
30 CAPSULE ORAL DAILY
Qty: 270 CAPSULE | Refills: 1 | Status: SHIPPED | OUTPATIENT
Start: 2024-05-30

## 2024-05-30 ASSESSMENT — ANXIETY QUESTIONNAIRES
GAD7 TOTAL SCORE: 5
5. BEING SO RESTLESS THAT IT IS HARD TO SIT STILL: SEVERAL DAYS
7. FEELING AFRAID AS IF SOMETHING AWFUL MIGHT HAPPEN: NOT AT ALL
IF YOU CHECKED OFF ANY PROBLEMS ON THIS QUESTIONNAIRE, HOW DIFFICULT HAVE THESE PROBLEMS MADE IT FOR YOU TO DO YOUR WORK, TAKE CARE OF THINGS AT HOME, OR GET ALONG WITH OTHER PEOPLE: SOMEWHAT DIFFICULT
GAD7 TOTAL SCORE: 5
1. FEELING NERVOUS, ANXIOUS, OR ON EDGE: SEVERAL DAYS
8. IF YOU CHECKED OFF ANY PROBLEMS, HOW DIFFICULT HAVE THESE MADE IT FOR YOU TO DO YOUR WORK, TAKE CARE OF THINGS AT HOME, OR GET ALONG WITH OTHER PEOPLE?: SOMEWHAT DIFFICULT
GAD7 TOTAL SCORE: 5
6. BECOMING EASILY ANNOYED OR IRRITABLE: NOT AT ALL
2. NOT BEING ABLE TO STOP OR CONTROL WORRYING: SEVERAL DAYS
3. WORRYING TOO MUCH ABOUT DIFFERENT THINGS: SEVERAL DAYS
4. TROUBLE RELAXING: SEVERAL DAYS
7. FEELING AFRAID AS IF SOMETHING AWFUL MIGHT HAPPEN: NOT AT ALL

## 2024-05-30 ASSESSMENT — PATIENT HEALTH QUESTIONNAIRE - PHQ9
SUM OF ALL RESPONSES TO PHQ QUESTIONS 1-9: 1
SUM OF ALL RESPONSES TO PHQ QUESTIONS 1-9: 1
10. IF YOU CHECKED OFF ANY PROBLEMS, HOW DIFFICULT HAVE THESE PROBLEMS MADE IT FOR YOU TO DO YOUR WORK, TAKE CARE OF THINGS AT HOME, OR GET ALONG WITH OTHER PEOPLE: NOT DIFFICULT AT ALL

## 2024-05-30 NOTE — TELEPHONE ENCOUNTER
Provider E-Visit time total (minutes): 5  ELECTRONIC VISIT DOCUMENTATION:    SUBJECTIVE:  Note above accurately captures the substance of my conversation with the patient.    ASSESSMENT/ PLAN:  1. Mild episode of recurrent major depressive disorder (H24)    - FLUoxetine (PROZAC) 10 MG capsule; Take 3 capsules (30 mg) by mouth daily  Dispense: 270 capsule; Refill: 1  Jovana Lambert MD  5/31/2024  7:31 AM

## 2024-05-31 ASSESSMENT — PATIENT HEALTH QUESTIONNAIRE - PHQ9: SUM OF ALL RESPONSES TO PHQ QUESTIONS 1-9: 1

## 2024-06-02 DIAGNOSIS — E06.3 HYPOTHYROIDISM DUE TO HASHIMOTO'S THYROIDITIS: ICD-10-CM

## 2024-06-03 NOTE — TELEPHONE ENCOUNTER
Levothyroxine (Synthroid/Levothroid) 125 MCG tablet    Last Written Prescription Date:  03/29/2024  Last Fill Quantity: 30,   # refills: 1  Last Office Visit: 05/09/2024

## 2024-06-04 RX ORDER — LEVOTHYROXINE SODIUM 125 UG/1
125 TABLET ORAL DAILY
Qty: 30 TABLET | Refills: 5 | Status: SHIPPED | OUTPATIENT
Start: 2024-06-04 | End: 2024-06-28

## 2024-06-12 ENCOUNTER — TELEPHONE (OUTPATIENT)
Dept: FAMILY MEDICINE | Facility: OTHER | Age: 34
End: 2024-06-12

## 2024-06-12 RX ORDER — ACETAZOLAMIDE 500 MG/1
500 CAPSULE, EXTENDED RELEASE ORAL 2 TIMES DAILY
Qty: 60 CAPSULE | Refills: 1 | Status: SHIPPED | OUTPATIENT
Start: 2024-06-12 | End: 2024-08-12

## 2024-06-12 NOTE — TELEPHONE ENCOUNTER
[facility] Dr.Hannah Amin is needing to speak  directly regarding this patient. She has asked that she be called on her cell phone [phone]. She needs to be speak to her today.

## 2024-06-16 DIAGNOSIS — E06.3 HYPOTHYROIDISM DUE TO HASHIMOTO'S THYROIDITIS: ICD-10-CM

## 2024-06-17 RX ORDER — LIOTHYRONINE SODIUM 5 UG/1
5 TABLET ORAL 2 TIMES DAILY
Qty: 180 TABLET | Refills: 1 | Status: SHIPPED | OUTPATIENT
Start: 2024-06-17

## 2024-06-17 NOTE — TELEPHONE ENCOUNTER
Disp Refills Start End SARA   liothyronine (CYTOMEL) 5 MCG tablet 180 tablet 1 12/14/2023 -- No     Last Office Visit: 05/30/2024 evisit  Future Office visit:    Next 5 appointments (look out 90 days)      Jun 27, 2024  8:30 AM  (Arrive by 8:15 AM)  Adult Preventative Visit with Jovana Lambert MD  [facility] [place] ([facility] [place] ) [address]  [place] [address]  [phone]             Routing refill request to provider for review/approval because:

## 2024-06-22 ENCOUNTER — LAB (OUTPATIENT)
Dept: LAB | Facility: HOSPITAL | Age: 34
End: 2024-06-22
Payer: COMMERCIAL

## 2024-06-22 DIAGNOSIS — E88.819 INSULIN RESISTANCE: ICD-10-CM

## 2024-06-22 LAB
FASTING STATUS PATIENT QL REPORTED: YES
GLUCOSE SERPL-MCNC: 99 MG/DL (ref 70–99)
INSULIN SERPL-ACNC: 14.1 UU/ML (ref 2.6–24.9)
NON GESTATIONAL GTT FASTING: 99 MG/DL (ref 60–125)

## 2024-06-22 PROCEDURE — 83525 ASSAY OF INSULIN: CPT

## 2024-06-22 PROCEDURE — 36415 COLL VENOUS BLD VENIPUNCTURE: CPT

## 2024-06-22 PROCEDURE — 82947 ASSAY GLUCOSE BLOOD QUANT: CPT

## 2024-06-22 SDOH — HEALTH STABILITY: PHYSICAL HEALTH: ON AVERAGE, HOW MANY DAYS PER WEEK DO YOU ENGAGE IN MODERATE TO STRENUOUS EXERCISE (LIKE A BRISK WALK)?: 2 DAYS

## 2024-06-22 SDOH — HEALTH STABILITY: PHYSICAL HEALTH: ON AVERAGE, HOW MANY MINUTES DO YOU ENGAGE IN EXERCISE AT THIS LEVEL?: 30 MIN

## 2024-06-22 ASSESSMENT — SOCIAL DETERMINANTS OF HEALTH (SDOH): HOW OFTEN DO YOU GET TOGETHER WITH FRIENDS OR RELATIVES?: TWICE A WEEK

## 2024-06-26 ASSESSMENT — PATIENT HEALTH QUESTIONNAIRE - PHQ9
10. IF YOU CHECKED OFF ANY PROBLEMS, HOW DIFFICULT HAVE THESE PROBLEMS MADE IT FOR YOU TO DO YOUR WORK, TAKE CARE OF THINGS AT HOME, OR GET ALONG WITH OTHER PEOPLE: NOT DIFFICULT AT ALL
SUM OF ALL RESPONSES TO PHQ QUESTIONS 1-9: 2
SUM OF ALL RESPONSES TO PHQ QUESTIONS 1-9: 2

## 2024-06-27 ENCOUNTER — LAB (OUTPATIENT)
Dept: LAB | Facility: OTHER | Age: 34
End: 2024-06-27
Payer: COMMERCIAL

## 2024-06-27 ENCOUNTER — OFFICE VISIT (OUTPATIENT)
Dept: FAMILY MEDICINE | Facility: OTHER | Age: 34
End: 2024-06-27
Attending: FAMILY MEDICINE
Payer: COMMERCIAL

## 2024-06-27 VITALS
RESPIRATION RATE: 16 BRPM | OXYGEN SATURATION: 97 % | TEMPERATURE: 97.9 F | HEIGHT: 65 IN | WEIGHT: 208.5 LBS | DIASTOLIC BLOOD PRESSURE: 77 MMHG | HEART RATE: 79 BPM | SYSTOLIC BLOOD PRESSURE: 112 MMHG | BODY MASS INDEX: 34.74 KG/M2

## 2024-06-27 DIAGNOSIS — E88.819 INSULIN RESISTANCE: ICD-10-CM

## 2024-06-27 DIAGNOSIS — E06.3 HYPOTHYROIDISM DUE TO HASHIMOTO'S THYROIDITIS: ICD-10-CM

## 2024-06-27 DIAGNOSIS — E53.8 VITAMIN B12 DEFICIENCY (NON ANEMIC): ICD-10-CM

## 2024-06-27 DIAGNOSIS — F41.1 GAD (GENERALIZED ANXIETY DISORDER): ICD-10-CM

## 2024-06-27 DIAGNOSIS — Z00.00 ROUTINE GENERAL MEDICAL EXAMINATION AT A HEALTH CARE FACILITY: Primary | ICD-10-CM

## 2024-06-27 DIAGNOSIS — L71.9 ROSACEA: ICD-10-CM

## 2024-06-27 DIAGNOSIS — E55.9 HYPOVITAMINOSIS D: ICD-10-CM

## 2024-06-27 DIAGNOSIS — E28.2 PCOS (POLYCYSTIC OVARIAN SYNDROME): ICD-10-CM

## 2024-06-27 DIAGNOSIS — Z12.4 CERVICAL CANCER SCREENING: ICD-10-CM

## 2024-06-27 DIAGNOSIS — E78.5 HYPERLIPIDEMIA LDL GOAL <130: ICD-10-CM

## 2024-06-27 DIAGNOSIS — F33.0 MILD EPISODE OF RECURRENT MAJOR DEPRESSIVE DISORDER (H): ICD-10-CM

## 2024-06-27 DIAGNOSIS — E61.1 IRON DEFICIENCY: ICD-10-CM

## 2024-06-27 LAB
ALBUMIN SERPL BCG-MCNC: 4.3 G/DL (ref 3.5–5.2)
ALP SERPL-CCNC: 77 U/L (ref 40–150)
ALT SERPL W P-5'-P-CCNC: 21 U/L (ref 0–50)
ANION GAP SERPL CALCULATED.3IONS-SCNC: 13 MMOL/L (ref 7–15)
AST SERPL W P-5'-P-CCNC: 17 U/L (ref 0–45)
BILIRUB SERPL-MCNC: 0.4 MG/DL
BUN SERPL-MCNC: 11.2 MG/DL (ref 6–20)
CALCIUM SERPL-MCNC: 9.4 MG/DL (ref 8.6–10)
CHLORIDE SERPL-SCNC: 109 MMOL/L (ref 98–107)
CHOLEST SERPL-MCNC: 159 MG/DL
CREAT SERPL-MCNC: 0.86 MG/DL (ref 0.51–0.95)
DEPRECATED HCO3 PLAS-SCNC: 17 MMOL/L (ref 22–29)
EGFRCR SERPLBLD CKD-EPI 2021: >90 ML/MIN/1.73M2
FASTING STATUS PATIENT QL REPORTED: YES
FASTING STATUS PATIENT QL REPORTED: YES
GLUCOSE SERPL-MCNC: 108 MG/DL (ref 70–99)
HDLC SERPL-MCNC: 57 MG/DL
LDLC SERPL CALC-MCNC: 88 MG/DL
NONHDLC SERPL-MCNC: 102 MG/DL
POTASSIUM SERPL-SCNC: 3.6 MMOL/L (ref 3.4–5.3)
PROT SERPL-MCNC: 7.6 G/DL (ref 6.4–8.3)
SODIUM SERPL-SCNC: 139 MMOL/L (ref 135–145)
T4 FREE SERPL-MCNC: 1.2 NG/DL (ref 0.9–1.7)
TRIGL SERPL-MCNC: 71 MG/DL
TSH SERPL DL<=0.005 MIU/L-ACNC: 0.13 UIU/ML (ref 0.3–4.2)

## 2024-06-27 PROCEDURE — 82306 VITAMIN D 25 HYDROXY: CPT

## 2024-06-27 PROCEDURE — 36415 COLL VENOUS BLD VENIPUNCTURE: CPT | Performed by: FAMILY MEDICINE

## 2024-06-27 PROCEDURE — 84439 ASSAY OF FREE THYROXINE: CPT

## 2024-06-27 PROCEDURE — 80053 COMPREHEN METABOLIC PANEL: CPT | Performed by: FAMILY MEDICINE

## 2024-06-27 PROCEDURE — 84481 FREE ASSAY (FT-3): CPT | Performed by: FAMILY MEDICINE

## 2024-06-27 PROCEDURE — 80061 LIPID PANEL: CPT | Performed by: FAMILY MEDICINE

## 2024-06-27 PROCEDURE — G0123 SCREEN CERV/VAG THIN LAYER: HCPCS | Performed by: FAMILY MEDICINE

## 2024-06-27 PROCEDURE — 84443 ASSAY THYROID STIM HORMONE: CPT

## 2024-06-27 PROCEDURE — 99395 PREV VISIT EST AGE 18-39: CPT | Performed by: FAMILY MEDICINE

## 2024-06-27 PROCEDURE — 99214 OFFICE O/P EST MOD 30 MIN: CPT | Mod: 25 | Performed by: FAMILY MEDICINE

## 2024-06-27 PROCEDURE — 87624 HPV HI-RISK TYP POOLED RSLT: CPT | Performed by: FAMILY MEDICINE

## 2024-06-27 PROCEDURE — 82607 VITAMIN B-12: CPT

## 2024-06-27 RX ORDER — LEVOTHYROXINE SODIUM 125 UG/1
125 TABLET ORAL DAILY
Qty: 30 TABLET | Refills: 5 | Status: CANCELLED | OUTPATIENT
Start: 2024-06-27

## 2024-06-27 SDOH — HEALTH STABILITY: PHYSICAL HEALTH: ON AVERAGE, HOW MANY DAYS PER WEEK DO YOU ENGAGE IN MODERATE TO STRENUOUS EXERCISE (LIKE A BRISK WALK)?: 3 DAYS

## 2024-06-27 SDOH — HEALTH STABILITY: PHYSICAL HEALTH: ON AVERAGE, HOW MANY MINUTES DO YOU ENGAGE IN EXERCISE AT THIS LEVEL?: 30 MIN

## 2024-06-27 ASSESSMENT — PAIN SCALES - GENERAL: PAINLEVEL: NO PAIN (0)

## 2024-06-27 ASSESSMENT — LIFESTYLE VARIABLES
SKIP TO QUESTIONS 9-10: 1
HOW MANY STANDARD DRINKS CONTAINING ALCOHOL DO YOU HAVE ON A TYPICAL DAY: 1 OR 2
HOW OFTEN DO YOU HAVE A DRINK CONTAINING ALCOHOL: MONTHLY OR LESS
AUDIT-C TOTAL SCORE: 1
HOW OFTEN DO YOU HAVE SIX OR MORE DRINKS ON ONE OCCASION: NEVER

## 2024-06-27 NOTE — PROGRESS NOTES
Preventive Care Visit  [facility]  Jovana Lambert MD, Family Medicine  Jun 27, 2024      Assessment & Plan     Routine general medical examination at a health care facility  Follow-up 1 year    PCOS (polycystic ovarian syndrome)  Continue inositol, nac and add jardiance  Follow-up 2-3 mos  - Comprehensive metabolic panel  - empagliflozin (JARDIANCE) 10 MG TABS tablet; Take 1 tablet (10 mg) by mouth daily    Insulin resistance  Didn't get 2 hr test done last week, will do this coming week  Follow-up over phone with results  - empagliflozin (JARDIANCE) 10 MG TABS tablet; Take 1 tablet (10 mg) by mouth daily  - Glucose; Standing  - Insulin level; Standing  - Non-gestational glucose tolerance testing, 2 hour; Future    Hypothyroidism due to Hashimoto's thyroiditis  pending  - T3, Free  - T3 total    Hyperlipidemia LDL goal <130  Improved, good job  - Lipid Profile (Chol, Trig, HDL, LDL calc)    Hypovitaminosis D  pending  - Vitamin D Deficiency; Future    Vitamin B12 deficiency (non anemic)  pending  - Vitamin B12; Future    Iron deficiency  resolved    Mild episode of recurrent major depressive disorder (H24)  Improved, getting out with friends, exercising    ROBBIE (generalized anxiety disorder)  Improved, 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    Cervical cancer screening  done  - Gynecologic Cytology (Pap) and HPV - Recommended Age 30-65 Years    Patient has been advised of split billing requirements and indicates understanding: Yes    Counseling  Appropriate preventive services were discussed with this patient, including applicable screening as appropriate for fall prevention, nutrition, physical activity, Tobacco-use cessation, weight loss and cognition.  Checklist reviewing preventive services available has been given to the patient.  Reviewed patient's diet, addressing concerns and/or questions.   She is at risk for lack of exercise and has been provided with information to  increase physical activity for the benefit of her well-being.   She is at risk for psychosocial distress and has been provided with information to reduce risk.     Patient was agreeable to this plan and had no further questions.  There are no Patient Instructions on file for this visit.  45 minutes spent by me on the date of the encounter doing chart review, history and exam, documentation and further activities per the note, over 35 minutes spent in acute and chronic conditions and remaining time in HCM and exam.    No follow-ups on file.    Juan David Harper is a 34 year old, presenting for the following:  Physical, Thyroid Problem, Lipids, Depression, and Anxiety        6/27/2024     8:16 AM   Additional Questions   Roomed by Whit Bean   Accompanied by None         6/27/2024     8:16 AM   Patient Reported Additional Medications   Patient reports taking the following new medications None        Health Care Directive  Patient does not have a Health Care Directive or Living Will: Discussed advance care planning with patient; however, patient declined at this time.    HPI  Depression and Anxiety   How are you doing with your depression since your last visit? Improved   How are you doing with your anxiety since your last visit?  No change  Are you having other symptoms that might be associated with depression or anxiety? No  Have you had a significant life event? No   Do you have any concerns with your use of alcohol or other drugs? No    Social History     Tobacco Use    Smoking status: Never    Smokeless tobacco: Never   Vaping Use    Vaping status: Never Used   Substance Use Topics    Alcohol use: Yes     Comment: Occasional    Drug use: Never         5/9/2024     9:55 AM 5/30/2024     3:14 PM 6/26/2024     9:51 AM   PHQ   PHQ-9 Total Score 4 1 2   Q9: Thoughts of better off dead/self-harm past 2 weeks Not at all Not at all Not at all         2/7/2024     5:37 PM 4/28/2024     2:14 PM 5/30/2024     3:14  PM   ROBBIE-7 SCORE   Total Score 5 (mild anxiety) 11 (moderate anxiety) 5 (mild anxiety)   Total Score 5 11 5         6/26/2024     9:51 AM   Last PHQ-9   1.  Little interest or pleasure in doing things 0   2.  Feeling down, depressed, or hopeless 0   3.  Trouble falling or staying asleep, or sleeping too much 0   4.  Feeling tired or having little energy 0   5.  Poor appetite or overeating 1   6.  Feeling bad about yourself 0   7.  Trouble concentrating 1   8.  Moving slowly or restless 0   Q9: Thoughts of better off dead/self-harm past 2 weeks 0   PHQ-9 Total Score 2         5/30/2024     3:14 PM   ROBBIE-7    1. Feeling nervous, anxious, or on edge 1   2. Not being able to stop or control worrying 1   3. Worrying too much about different things 1   4. Trouble relaxing 1   5. Being so restless that it is hard to sit still 1   6. Becoming easily annoyed or irritable 0   7. Feeling afraid, as if something awful might happen 0   ROBBIE-7 Total Score 5   If you checked any problems, how difficult have they made it for you to do your work, take care of things at home, or get along with other people? Somewhat difficult       Suicide Assessment Five-step Evaluation and Treatment (SAFE-T)  Hypothyroidism Follow-up    Since last visit, patient describes the following symptoms: weight loss of 8.3 lbs and loose stools        6/22/2024   General Health   How would you rate your overall physical health? Good   Feel stress (tense, anxious, or unable to sleep) Only a little      (!) STRESS CONCERN      6/22/2024   Nutrition   Three or more servings of calcium each day? Yes   Diet: Carbohydrate counting   How many servings of fruit and vegetables per day? (!) 2-3   How many sweetened beverages each day? 0-1            6/22/2024   Exercise   Days per week of moderate/strenous exercise 2 days   Average minutes spent exercising at this level 30 min      (!) EXERCISE CONCERN      6/22/2024   Social Factors   Frequency of gathering with  friends or relatives Twice a week   Worry food won't last until get money to buy more No   Food not last or not have enough money for food? No   Do you have housing? (Housing is defined as stable permanent housing and does not include staying ouside in a car, in a tent, in an abandoned building, in an overnight shelter, or couch-surfing.) Yes   Are you worried about losing your housing? No   Lack of transportation? No   Unable to get utilities (heat,electricity)? No            6/22/2024   Dental   Dentist two times every year? Yes            6/22/2024   TB Screening   Were you born outside of the [place]? No          Today's PHQ-9 Score:       6/26/2024     9:51 AM   PHQ-9 SCORE   PHQ-9 Total Score MyChart 2 (Minimal depression)   PHQ-9 Total Score 2         6/22/2024   Substance Use   Alcohol more than 3/day or more than 7/wk No   Do you use any other substances recreationally? No        Social History     Tobacco Use    Smoking status: Never    Smokeless tobacco: Never   Vaping Use    Vaping status: Never Used   Substance Use Topics    Alcohol use: Yes     Comment: Occasional    Drug use: Never             6/22/2024   Breast Cancer Screening   Family history of breast, colon, or ovarian cancer? No / Unknown         Mammogram Screening - Patient under 40 years of age: Routine Mammogram Screening not recommended.         6/22/2024   STI Screening   New sexual partner(s) since last STI/HIV test? No        History of abnormal Pap smear: No - age 30- 64 PAP with HPV every 5 years recommended        7/1/2016    12:00 AM   PAP / HPV   PAP (Historical) NIL            6/22/2024   Contraception/Family Planning   Questions about contraception or family planning No           Reviewed and updated as needed this visit by Provider                    Past Medical History:   Diagnosis Date    2019 novel coronavirus disease (COVID-19) 08/2021    B12 deficiency 06/25/2012    resolved    Hashimoto's disease 09/02/2011    Insulin  resistance     Mild scoliosis     [facility]phrolithiasis      in [facility]at [facility])    PCOS (polycystic ovarian syndrome)     Recurrent major depressive disorder, in partial remission (H24)      Past Surgical History:   Procedure Laterality Date    EXAM UNDER ANESTHESIA RECTUM N/A 2022    Procedure: EXAM UNDER ANESTHESIA, RECTUM;  Surgeon: Denilson Singh MD;  Location: [facility]    HEMORRHOIDECTOMY EXTERNAL N/A 2022    Procedure: Hemorrhoidectomy;  Surgeon: Denilson Singh MD;  Location: [facility]    MAMMOPLASTY REDUCTION  2015    wisdom teeth       OB History    Para Term  AB Living   0 0 0 0 0 0   SAB IAB Ectopic Multiple Live Births   0 0 0 0 0     Lab work is in process  Labs reviewed in EPIC  BP Readings from Last 3 Encounters:   24 112/77   24 120/70   24 124/78    Wt Readings from Last 3 Encounters:   24 94.6 kg (208 lb 8 oz)   24 98.8 kg (217 lb 12.8 oz)   24 99.8 kg (220 lb)                  Patient Active Problem List   Diagnosis    PCOS (polycystic ovarian syndrome)    ACP (advance care planning)    Hypothyroidism due to Hashimoto's thyroiditis    Hyperlipidemia LDL goal <130    Iron deficiency    Rosacea    Enlarged lymph nodes    Sebaceous cyst    Weight gain    Obesity (BMI 35.0-39.9) with comorbidity (H)    Other acne    Papilledema of both eyes    Spinal puncture headache    Vitamin B12 deficiency (non anemic)    Metrorrhagia    Hypokalemia    Insomnia, unspecified type    Somatic dysfunction of lumbar region    ROBBIE (generalized anxiety disorder)    Mild episode of recurrent major depressive disorder (H24)    Thrombosed external hemorrhoids    Hypovitaminosis D    Elevated prolactin level    Insulin resistance    Menorrhagia with regular cycle     Past Surgical History:   Procedure Laterality Date    EXAM UNDER ANESTHESIA RECTUM N/A 2022    Procedure: EXAM UNDER ANESTHESIA, RECTUM;  Surgeon: Denilson Singh MD;  Location: [facility]     HEMORRHOIDECTOMY EXTERNAL N/A 8/9/2022    Procedure: Hemorrhoidectomy;  Surgeon: Denilson Singh MD;  Location: [facility]    MAMMOPLASTY REDUCTION  2015    wisdom teeth         Social History     Tobacco Use    Smoking status: Never    Smokeless tobacco: Never   Substance Use Topics    Alcohol use: Yes     Comment: <1/mo     Family History   Problem Relation Age of Onset    Thyroid Disease Mother         hypothyroidism    Anxiety Disorder Mother     Nephrolithiasis Father     Cataracts Father     Anxiety Disorder Sister     Polycystic ovary syndrome Sister     Insulin Resistance Sister     Family History Negative Sister     Family History Negative Brother     Depression Brother     Thyroid Disease Brother     Obesity Brother     Hypothyroidism Maternal Grandmother     Cerebrovascular Disease Maternal Grandmother 76    Rheumatoid Arthritis Maternal Grandfather     Cerebrovascular Disease Paternal Grandmother 85        old age    Diabetes Paternal Grandfather         type 2    Hypertension Paternal Grandfather          Current Outpatient Medications   Medication Sig Dispense Refill    acetaZOLAMIDE (DIAMOX SEQUEL) 500 MG 12 hr capsule Take 1 capsule (500 mg) by mouth 2 times daily 60 capsule 1    Acetylcysteine, Nutrient, 600 MG TABS Take 600 mg by mouth 2 times daily      cabergoline (DOSTINEX) 0.5 MG tablet Take 1 tablet (0.5 mg) by mouth once a week 12 tablet 1    empagliflozin (JARDIANCE) 10 MG TABS tablet Take 1 tablet (10 mg) by mouth daily 90 tablet 1    FLUoxetine (PROZAC) 10 MG capsule Take 3 capsules (30 mg) by mouth daily 270 capsule 1    Inositol POWD 2,000 mg 2 times daily      levothyroxine (SYNTHROID/LEVOTHROID) 125 MCG tablet Take 1 Tablet by mouth one time a day. 30 tablet 5    liothyronine (CYTOMEL) 5 MCG tablet Take 1 Tablet by mouth two times a day. 180 tablet 1    traZODone (DESYREL) 50 MG tablet Take 1 tablet (50 mg) by mouth at bedtime 90 tablet 1    triamcinolone (KENALOG) 0.1 % external cream  "Apply  topically 2 (two) times a day. 45 g 1    vitamin D3 (CHOLECALCIFEROL) 2000 units (50 mcg) tablet Take 2 tablets (4,000 Units) by mouth daily 90 tablet 1     Allergies   Allergen Reactions    Cats Itching     Cat/feline product derivatives    Dogs Angioedema     Recent Labs   Lab Test 06/27/24  0815 05/09/24  1324 03/28/24  1210 02/08/24  1046 08/14/23  0925 06/26/23  0925 02/13/23  0730 12/09/22  1155 11/21/21  0746 09/03/21  1619 08/14/20  1552 05/26/20  1922 10/17/19  0932 07/01/19  0803   A1C  --   --   --  5.1  --   --   --   --   --   --   --   --   --   --    LDL 88  --   --   --   --  100  --   --   --  129*  --   --   --  99   HDL 57  --   --   --   --  55  --   --   --  53  --   --   --  63   TRIG 71  --   --   --   --  89  --   --   --  174*  --   --   --  117   ALT 21  --   --   --   --  29  --  24  --  29   < > 23  --  27   CR 0.86  --   --   --   --  0.61  --  0.75   < > 0.85  --  0.72  --  0.71   GFRESTIMATED >90  --   --   --   --  >90  --  >90   < > >90  --  >90  --  >90   GFRESTBLACK  --   --   --   --   --   --   --   --   --   --   --  >90  --  >90   POTASSIUM 3.6  --   --   --   --  4.1  --  3.7   < > 3.2*  --  3.8  --  4.0   TSH 0.13* 0.83   < >  --    < > 0.01*   < > 15.19*   < > 1.06   < > 8.63*   < > <0.01*    < > = values in this interval not displayed.      Review of Systems  Constitutional, HEENT, cardiovascular, pulmonary, GI, , musculoskeletal, neuro, skin, endocrine and psych systems are negative, except as otherwise noted.     Objective    Exam  /77 (BP Location: Left arm, Patient Position: Sitting, Cuff Size: Adult Large)   Pulse 79   Temp 97.9  F (36.6  C) (Tympanic)   Resp 16   Ht 1.651 m (5' 5\")   Wt 94.6 kg (208 lb 8 oz)   LMP 06/08/2024 (Exact Date)   SpO2 97%   BMI 34.70 kg/m     Estimated body mass index is 34.7 kg/m  as calculated from the following:    Height as of this encounter: 1.651 m (5' 5\").    Weight as of this encounter: 94.6 kg (208 lb 8 " oz).    Physical Exam  GENERAL: alert and no distress  EYES: Eyes grossly normal to inspection, PERRL and conjunctivae and sclerae normal  HENT: ear canals and TM's normal, nose and mouth without ulcers or lesions  NECK: no adenopathy, no asymmetry, masses, or scars  RESP: lungs clear to auscultation - no rales, rhonchi or wheezes  BREAST: normal without masses, tenderness or nipple discharge and no palpable axillary masses or adenopathy  CV: regular rate and rhythm, normal S1 S2, no S3 or S4, no murmur, click or rub, no peripheral edema  ABDOMEN: soft, nontender, no hepatosplenomegaly, no masses and bowel sounds normal   (female) w/bimanual: normal female external genitalia, normal urethral meatus, normal vaginal mucosa, and normal cervix/adnexa/uterus without masses or discharge   (female): pap smear done  MS: no gross musculoskeletal defects noted, no edema  SKIN: no suspicious lesions or rashes  NEURO: Normal strength and tone, mentation intact and speech normal  PSYCH: mentation appears normal, affect normal/bright    Signed Electronically by: Jovana Lambert MD    Answers submitted by the patient for this visit:  Patient Health Questionnaire (Submitted on 6/26/2024)  If you checked off any problems, how difficult have these problems made it for you to do your work, take care of things at home, or get along with other people?: Not difficult at all  PHQ9 TOTAL SCORE: 2

## 2024-06-28 LAB
T3 SERPL-MCNC: 111 NG/DL (ref 85–202)
T3FREE SERPL-MCNC: 2.9 PG/ML (ref 2–4.4)
VIT B12 SERPL-MCNC: 804 PG/ML (ref 232–1245)
VIT D+METAB SERPL-MCNC: 69 NG/ML (ref 20–50)

## 2024-06-28 RX ORDER — LEVOTHYROXINE SODIUM 125 UG/1
125 TABLET ORAL DAILY
Qty: 90 TABLET | Refills: 2 | Status: SHIPPED | OUTPATIENT
Start: 2024-06-28

## 2024-07-01 LAB
HPV HR 12 DNA CVX QL NAA+PROBE: NEGATIVE
HPV16 DNA CVX QL NAA+PROBE: NEGATIVE
HPV18 DNA CVX QL NAA+PROBE: NEGATIVE
HUMAN PAPILLOMA VIRUS FINAL DIAGNOSIS: NORMAL

## 2024-07-02 ENCOUNTER — LAB (OUTPATIENT)
Dept: LAB | Facility: OTHER | Age: 34
End: 2024-07-02
Payer: COMMERCIAL

## 2024-07-02 DIAGNOSIS — E88.819 INSULIN RESISTANCE: ICD-10-CM

## 2024-07-02 LAB
FASTING STATUS PATIENT QL REPORTED: NO
GLUCOSE SERPL-MCNC: 123 MG/DL (ref 70–99)
GLUCOSE SERPL-MCNC: 128 MG/DL (ref 70–99)
GLUCOSE SERPL-MCNC: 142 MG/DL (ref 70–99)
INSULIN SERPL-ACNC: 128 UU/ML (ref 2.6–24.9)
INSULIN SERPL-ACNC: 48.9 UU/ML (ref 2.6–24.9)
INSULIN SERPL-ACNC: 54.1 UU/ML (ref 2.6–24.9)
INSULIN SERPL-ACNC: 8.7 UU/ML (ref 2.6–24.9)
INSULIN SERPL-ACNC: 80.1 UU/ML (ref 2.6–24.9)
NON GESTATIONAL GTT 2 HR POST DOSE: 119 MG/DL (ref 60–199)
NON GESTATIONAL GTT FASTING: 95 MG/DL (ref 60–125)

## 2024-07-02 PROCEDURE — 82947 ASSAY GLUCOSE BLOOD QUANT: CPT | Mod: 91

## 2024-07-02 PROCEDURE — 83525 ASSAY OF INSULIN: CPT | Mod: 91

## 2024-07-02 PROCEDURE — 36415 COLL VENOUS BLD VENIPUNCTURE: CPT

## 2024-07-02 PROCEDURE — 82950 GLUCOSE TEST: CPT

## 2024-07-03 LAB
BKR LAB AP GYN ADEQUACY: NORMAL
BKR LAB AP GYN INTERPRETATION: NORMAL
BKR LAB AP PREVIOUS ABNORMAL: NORMAL
PATH REPORT.COMMENTS IMP SPEC: NORMAL
PATH REPORT.COMMENTS IMP SPEC: NORMAL
PATH REPORT.RELEVANT HX SPEC: NORMAL

## 2024-08-11 DIAGNOSIS — H47.10 PAPILLEDEMA OF BOTH EYES: ICD-10-CM

## 2024-08-12 RX ORDER — ACETAZOLAMIDE 500 MG/1
500 CAPSULE, EXTENDED RELEASE ORAL 2 TIMES DAILY
Qty: 60 CAPSULE | Refills: 0 | Status: SHIPPED | OUTPATIENT
Start: 2024-08-12 | End: 2024-09-09

## 2024-08-12 NOTE — TELEPHONE ENCOUNTER
Diamox Sequel       Last Written Prescription Date:  6/12/224  Last Fill Quantity: 60,   # refills: 1  Last Office Visit: 6/27/2024  Future Office visit:    Next 5 appointments (look out 90 days)      Sep 11, 2024 10:45 AM  (Arrive by 10:30 AM)  Provider Visit with Jovana Lambert MD  [facility] [place] ([facility] [place] ) [address]  [place] [address]  [phone]

## 2024-09-08 DIAGNOSIS — H47.10 PAPILLEDEMA OF BOTH EYES: ICD-10-CM

## 2024-09-09 RX ORDER — ACETAZOLAMIDE 500 MG/1
500 CAPSULE, EXTENDED RELEASE ORAL 2 TIMES DAILY
Qty: 60 CAPSULE | Refills: 0 | Status: SHIPPED | OUTPATIENT
Start: 2024-09-09

## 2024-09-09 NOTE — TELEPHONE ENCOUNTER
Diamox      Last Written Prescription Date:  8.13.24  Last Fill Quantity: #60,   # refills: 0  Last Office Visit: 6.27.24  Future Office visit:    Next 5 appointments (look out 90 days)      Sep 11, 2024 10:45 AM  (Arrive by 10:30 AM)  Provider Visit with Jovana Lambert MD  [facility] [place] ([facility] [place] ) [address]  [place] [address]  [phone]             Routing refill request to provider for review/approval because:  Drug not on the [facility], [facility] or [facility] refill protocol or controlled substance

## 2024-09-11 ENCOUNTER — OFFICE VISIT (OUTPATIENT)
Dept: FAMILY MEDICINE | Facility: OTHER | Age: 34
End: 2024-09-11
Attending: FAMILY MEDICINE
Payer: COMMERCIAL

## 2024-09-11 VITALS
BODY MASS INDEX: 32.69 KG/M2 | OXYGEN SATURATION: 98 % | TEMPERATURE: 97.3 F | HEART RATE: 69 BPM | HEIGHT: 65 IN | DIASTOLIC BLOOD PRESSURE: 80 MMHG | SYSTOLIC BLOOD PRESSURE: 108 MMHG | WEIGHT: 196.2 LBS

## 2024-09-11 DIAGNOSIS — F33.0 MILD EPISODE OF RECURRENT MAJOR DEPRESSIVE DISORDER (H): ICD-10-CM

## 2024-09-11 DIAGNOSIS — E28.2 PCOS (POLYCYSTIC OVARIAN SYNDROME): Primary | ICD-10-CM

## 2024-09-11 DIAGNOSIS — E88.819 INSULIN RESISTANCE: ICD-10-CM

## 2024-09-11 DIAGNOSIS — H47.10 PAPILLEDEMA OF BOTH EYES: ICD-10-CM

## 2024-09-11 DIAGNOSIS — L71.9 ROSACEA: ICD-10-CM

## 2024-09-11 DIAGNOSIS — E06.3 HYPOTHYROIDISM DUE TO HASHIMOTO'S THYROIDITIS: ICD-10-CM

## 2024-09-11 PROBLEM — E66.01 MORBID OBESITY (H): Status: RESOLVED | Noted: 2019-11-27 | Resolved: 2024-09-11

## 2024-09-11 LAB
ALBUMIN SERPL BCG-MCNC: 4.4 G/DL (ref 3.5–5.2)
ALP SERPL-CCNC: 90 U/L (ref 40–150)
ALT SERPL W P-5'-P-CCNC: 24 U/L (ref 0–50)
ANION GAP SERPL CALCULATED.3IONS-SCNC: 11 MMOL/L (ref 7–15)
AST SERPL W P-5'-P-CCNC: 22 U/L (ref 0–45)
BILIRUB SERPL-MCNC: 0.3 MG/DL
BUN SERPL-MCNC: 13.9 MG/DL (ref 6–20)
CALCIUM SERPL-MCNC: 9.4 MG/DL (ref 8.8–10.4)
CHLORIDE SERPL-SCNC: 109 MMOL/L (ref 98–107)
CREAT SERPL-MCNC: 0.84 MG/DL (ref 0.51–0.95)
EGFRCR SERPLBLD CKD-EPI 2021: >90 ML/MIN/1.73M2
GLUCOSE SERPL-MCNC: 98 MG/DL (ref 70–99)
HCO3 SERPL-SCNC: 20 MMOL/L (ref 22–29)
POTASSIUM SERPL-SCNC: 4.3 MMOL/L (ref 3.4–5.3)
PROT SERPL-MCNC: 7.6 G/DL (ref 6.4–8.3)
SODIUM SERPL-SCNC: 140 MMOL/L (ref 135–145)
T4 FREE SERPL-MCNC: 1.4 NG/DL (ref 0.9–1.7)
TSH SERPL DL<=0.005 MIU/L-ACNC: 0.02 UIU/ML (ref 0.3–4.2)

## 2024-09-11 PROCEDURE — 80053 COMPREHEN METABOLIC PANEL: CPT | Performed by: FAMILY MEDICINE

## 2024-09-11 PROCEDURE — 84443 ASSAY THYROID STIM HORMONE: CPT | Performed by: FAMILY MEDICINE

## 2024-09-11 PROCEDURE — 84481 FREE ASSAY (FT-3): CPT | Performed by: FAMILY MEDICINE

## 2024-09-11 PROCEDURE — 36415 COLL VENOUS BLD VENIPUNCTURE: CPT | Performed by: FAMILY MEDICINE

## 2024-09-11 PROCEDURE — 99214 OFFICE O/P EST MOD 30 MIN: CPT | Performed by: FAMILY MEDICINE

## 2024-09-11 PROCEDURE — 84439 ASSAY OF FREE THYROXINE: CPT | Performed by: FAMILY MEDICINE

## 2024-09-11 PROCEDURE — G2211 COMPLEX E/M VISIT ADD ON: HCPCS | Performed by: FAMILY MEDICINE

## 2024-09-11 PROCEDURE — 84480 ASSAY TRIIODOTHYRONINE (T3): CPT | Performed by: FAMILY MEDICINE

## 2024-09-11 ASSESSMENT — PAIN SCALES - GENERAL: PAINLEVEL: NO PAIN (0)

## 2024-09-11 ASSESSMENT — ANXIETY QUESTIONNAIRES
GAD7 TOTAL SCORE: 5
GAD7 TOTAL SCORE: 5
7. FEELING AFRAID AS IF SOMETHING AWFUL MIGHT HAPPEN: NOT AT ALL
8. IF YOU CHECKED OFF ANY PROBLEMS, HOW DIFFICULT HAVE THESE MADE IT FOR YOU TO DO YOUR WORK, TAKE CARE OF THINGS AT HOME, OR GET ALONG WITH OTHER PEOPLE?: NOT DIFFICULT AT ALL
GAD7 TOTAL SCORE: 5

## 2024-09-11 NOTE — PATIENT INSTRUCTIONS
Metamucil, citrucel or benefiber -- 1/2 Tbsp daily in the morning (powder)  After 2-3 weeks, increase to 1 Tbsp daily

## 2024-09-11 NOTE — PROGRESS NOTES
(E28.2) PCOS (polycystic ovarian syndrome)  (primary encounter diagnosis)  Comment: reviewed FEMM chart  Plan: Comprehensive metabolic panel, empagliflozin         (JARDIANCE) 25 MG TABS tablet        Increase jardiance to 25 mg    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 thyroiditis  Comment:   Plan: TSH with free T4 reflex, T3, Free, T3 total, T4        free        Labs pending    (F33.0) Mild episode of recurrent major depressive disorder (H24)  Comment:   Plan: discussed, situational things with relationship/s    (H47.10) Papilledema of both eyes  Comment: improving  Plan: Comprehensive metabolic panel        Follow-up 6 mos with ophtho    (E88.279) Insulin resistance  Comment:   Plan: increase jardiance, congratulated on weight loss        Clinic Visit  Date of visit: 2024   Chief Complaint:   Chief Complaint   Patient presents with    PCOS       HPI:   Meredith Gordon is a 34 year old  female who presents to clinic today for follow up  for PCOS .     Menarche: age 14  Menses: irregular periods . Patient's last menstrual period was 2024 (exact date).   Family Planning Chart: No.  Acne: No.  Hirsutism (facial hair): No.  Male-pattern hair loss: No.  Elevated serum androgen:   Lab Results   Component Value Date    DHEA 283 2023    TESTOSTTOTAL 25 2023    FT 0.31 2023    JOVITA 1.900 2023   ]  BMI: Body mass index is 32.65 kg/m .   Type 2 DM: No.  Elevated Cholesterol: No.  Mood disorder: Yes: anxiety and depression .    Gynecologic History:  Last Pap:   Lab Results   Component Value Date    PAP NIL 2016      History of abnormal pap: No.    Obstetric History:   OB History    Para Term  AB Living   0 0 0 0 0 0   SAB IAB Ectopic Multiple Live Births   0 0 0 0 0     Obstetric history significant for:  n/a    Medications:  Current Outpatient Medications   Medication Sig Dispense Refill    acetaZOLAMIDE (DIAMOX SEQUEL) 500 MG 12 hr capsule Take 1  Capsule by mouth two times a day. 60 capsule 0    Acetylcysteine, Nutrient, 600 MG TABS Take 600 mg by mouth 2 times daily      cabergoline (DOSTINEX) 0.5 MG tablet Take 1 tablet (0.5 mg) by mouth once a week 12 tablet 1    empagliflozin (JARDIANCE) 10 MG TABS tablet Take 1 tablet (10 mg) by mouth daily 90 tablet 1    FLUoxetine (PROZAC) 10 MG capsule Take 3 capsules (30 mg) by mouth daily 270 capsule 1    Inositol POWD 2,000 mg 2 times daily      levothyroxine (SYNTHROID/LEVOTHROID) 125 MCG tablet Take 1 tablet (125 mcg) by mouth daily 90 tablet 2    liothyronine (CYTOMEL) 5 MCG tablet Take 1 Tablet by mouth two times a day. 180 tablet 1    traZODone (DESYREL) 50 MG tablet Take 1 tablet (50 mg) by mouth at bedtime 90 tablet 1    triamcinolone (KENALOG) 0.1 % external cream Apply  topically 2 (two) times a day. 45 g 1    vitamin D3 (CHOLECALCIFEROL) 2000 units (50 mcg) tablet Take 2 tablets (4,000 Units) by mouth daily 90 tablet 1     No current facility-administered medications for this visit.       Allergy:  Allergies   Allergen Reactions    Dogs Angioedema and Itching    Cats Itching     Cat/feline product derivatives       Medical History:  Past Medical History:   Diagnosis Date    2019 novel coronavirus disease (COVID-19) 08/2021    B12 deficiency 06/25/2012    resolved    Hashimoto's disease 09/02/2011    Insulin resistance 2023    Mild scoliosis 2022    Nephrolithiasis     2011 in [facility] (at [facility])    PCOS (polycystic ovarian syndrome)     Recurrent major depressive disorder, in partial remission (H24)        Surgical History:  Past Surgical History:   Procedure Laterality Date    EXAM UNDER ANESTHESIA RECTUM N/A 8/9/2022    Procedure: EXAM UNDER ANESTHESIA, RECTUM;  Surgeon: Denilson Singh MD;  Location: [facility]    HEMORRHOIDECTOMY EXTERNAL N/A 8/9/2022    Procedure: Hemorrhoidectomy;  Surgeon: Denilson Singh MD;  Location: [facility]    MAMMOPLASTY REDUCTION  2015    wisdom teeth         Social  History:  Social History    Substance and Sexual Activity      Alcohol use: Yes        Comment: <1/mo    History   Smoking Status    Never   Smokeless Tobacco    Never     History   Drug Use Unknown     History   Sexual Activity    Sexual activity: Never     Relationship status is: Never been .    Depression   How are you doing with your depression since your last visit? No change  Are you having other symptoms that might be associated with depression? No  Have you had a significant life event?  No   Are you feeling anxious or having panic attacks?   Yes:  little bit anxious   Do you have any concerns with your use of alcohol or other drugs? No    Social History     Tobacco Use    Smoking status: Never    Smokeless tobacco: Never   Vaping Use    Vaping status: Never Used   Substance Use Topics    Alcohol use: Yes     Comment: <1/mo    Drug use: Never         5/9/2024     9:55 AM 5/30/2024     3:14 PM 6/26/2024     9:51 AM   PHQ   PHQ-9 Total Score 4 1 2   Q9: Thoughts of better off dead/self-harm past 2 weeks Not at all Not at all Not at all         4/28/2024     2:14 PM 5/30/2024     3:14 PM 9/11/2024    10:08 AM   ROBBIE-7 SCORE   Total Score 11 (moderate anxiety) 5 (mild anxiety) 5 (mild anxiety)   Total Score 11 5 5         9/11/2024    10:08 AM   ROBBIE-7    1. Feeling nervous, anxious, or on edge 1   2. Not being able to stop or control worrying 1   3. Worrying too much about different things 1   4. Trouble relaxing 1   5. Being so restless that it is hard to sit still 1   6. Becoming easily annoyed or irritable 0   7. Feeling afraid, as if something awful might happen 0   ROBBIE-7 Total Score 5   If you checked any problems, how difficult have they made it for you to do your work, take care of things at home, or get along with other people? Not difficult at all       Review of Systems  Constitutional, neuro, ENT, endocrine, pulmonary, cardiac, gastrointestinal, genitourinary, musculoskeletal, integument and  "psychiatric systems are negative, except as otherwise noted.      Objective    /80 (BP Location: Right arm, Patient Position: Sitting, Cuff Size: Adult Regular)   Pulse 69   Temp 97.3  F (36.3  C) (Tympanic)   Ht 1.651 m (5' 5\")   Wt 89 kg (196 lb 3.2 oz)   LMP 08/29/2024 (Exact Date)   SpO2 98%   BMI 32.65 kg/m    Body mass index is 32.65 kg/m .  Physical Exam   GENERAL: alert and no distress  RESP: lungs clear to auscultation - no rales, rhonchi or wheezes  CV: regular rate and rhythm, normal S1 S2, no S3 or S4, no murmur, click or rub, no peripheral edema  MS: no gross musculoskeletal defects noted, no edema  PSYCH: mentation appears normal, affect normal/bright  PSYCH: emotional at times, appropriate    Results for orders placed or performed in visit on 09/11/24   TSH with free T4 reflex     Status: Abnormal   Result Value Ref Range    TSH 0.02 (L) 0.30 - 4.20 uIU/mL   Comprehensive metabolic panel     Status: Abnormal   Result Value Ref Range    Sodium 140 135 - 145 mmol/L    Potassium 4.3 3.4 - 5.3 mmol/L    Carbon Dioxide (CO2) 20 (L) 22 - 29 mmol/L    Anion Gap 11 7 - 15 mmol/L    Urea Nitrogen 13.9 6.0 - 20.0 mg/dL    Creatinine 0.84 0.51 - 0.95 mg/dL    GFR Estimate >90 >60 mL/min/1.73m2    Calcium 9.4 8.8 - 10.4 mg/dL    Chloride 109 (H) 98 - 107 mmol/L    Glucose 98 70 - 99 mg/dL    Alkaline Phosphatase 90 40 - 150 U/L    AST 22 0 - 45 U/L    ALT 24 0 - 50 U/L    Protein Total 7.6 6.4 - 8.3 g/dL    Albumin 4.4 3.5 - 5.2 g/dL    Bilirubin Total 0.3 <=1.2 mg/dL   T4 free     Status: Normal   Result Value Ref Range    Free T4 1.40 0.90 - 1.70 ng/dL           Signed Electronically by: Jovana Lambert MD      "

## 2024-09-12 LAB
T3 SERPL-MCNC: 141 NG/DL (ref 85–202)
T3FREE SERPL-MCNC: 3.7 PG/ML (ref 2–4.4)

## 2024-09-29 DIAGNOSIS — F51.02 ADJUSTMENT INSOMNIA: ICD-10-CM

## 2024-09-30 RX ORDER — TRAZODONE HYDROCHLORIDE 50 MG/1
50 TABLET, FILM COATED ORAL AT BEDTIME
Qty: 90 TABLET | Refills: 1 | Status: SHIPPED | OUTPATIENT
Start: 2024-09-30

## 2024-09-30 NOTE — TELEPHONE ENCOUNTER
Trazodone      Last Written Prescription Date:  3/28/24  Last Fill Quantity: 90,   # refills: 1  Last Office Visit: 9/11/24  Future Office visit:    Next 5 appointments (look out 90 days)      Nov 15, 2024 8:30 AM  (Arrive by 8:15 AM)  Provider Visit with Jovana Lambert MD  [facility] [place] ([facility] [place] ) [address]  [place] [address]  [phone]             Routing refill request to provider for review/approval because:

## 2024-10-13 DIAGNOSIS — H47.10 PAPILLEDEMA OF BOTH EYES: ICD-10-CM

## 2024-10-14 RX ORDER — ACETAZOLAMIDE 500 MG/1
500 CAPSULE, EXTENDED RELEASE ORAL 2 TIMES DAILY
Qty: 60 CAPSULE | Refills: 2 | Status: SHIPPED | OUTPATIENT
Start: 2024-10-14

## 2024-10-14 NOTE — TELEPHONE ENCOUNTER
Diamox      Last Written Prescription Date:  9.11.24  Last Fill Quantity: #60,   # refills: 0  Last Office Visit: 9.11.24  Future Office visit:    Next 5 appointments (look out 90 days)      Nov 15, 2024 8:30 AM  (Arrive by 8:15 AM)  Provider Visit with Jovana Lambert MD  [facility] [place] ([facility] [place] ) [address]  [place] [address]  [phone]             Routing refill request to provider for review/approval because:  Drug not on the [facility], [facility] or [facility] refill protocol or controlled substance

## 2024-11-14 NOTE — PROGRESS NOTES
"The longitudinal plan of care for the diagnosis(es)/condition(s) as documented were addressed during this visit. Due to the added complexity in care, I will continue to support Meredith in the subsequent management and with ongoing continuity of care.    Assessment & Plan     PCOS (polycystic ovarian syndrome)  Start LDN and follow-up 2-3 mos  - Naltrexone POWD; 1.5 mg at bedtime for 7 days, THEN 3 mg at bedtime for 7 days, THEN 4.5 mg at bedtime for 7 days.    Hypothyroidism due to Hashimoto's thyroiditis  Reviewed personal and family history -- LDN may help her bowel symptoms  - TSH with free T4 reflex; Future  - T3, Free; Future  - T3, total; Future  - Naltrexone POWD; 1.5 mg at bedtime for 7 days, THEN 3 mg at bedtime for 7 days, THEN 4.5 mg at bedtime for 7 days.    Elevated prolactin level  Needs repeat next week  - Prolactin; Future    IBS type symptoms -- may need colonoscopy if not improving  Quitting metformin didn't resolve it, whole 30 didn't resolve it  Strong fhx autoimmune issues, trial LDN    BMI  Estimated body mass index is 31.75 kg/m  as calculated from the following:    Height as of this encounter: 1.651 m (5' 5\").    Weight as of this encounter: 86.5 kg (190 lb 12.8 oz).   Weight management plan: Discussed healthy diet and exercise guidelines    Patient was agreeable to this plan and had no further questions.  Patient Instructions   Karla cordova    [facility]avan ([employer])      [facility] [place] [address]   [phone]     Patient Education  Irritable Bowel Syndrome: Care Instructions  Overview  Irritable bowel syndrome, or IBS, is a problem with the intestines that causes belly pain, bloating, gas, constipation, and diarrhea. The cause of IBS is not well known. IBS can last for many years, but it does not get worse over time or lead to serious disease.  Most people can control their symptoms by changing their diet and avoiding things that make their " symptoms worse.  Follow-up care is a key part of your treatment and safety. Be sure to make and go to all appointments, and call your doctor if you are having problems. It's also a good idea to know your test results and keep a list of the medicines you take.  How can you care for yourself at home?  Keep track of foods and symptoms.  Keep a food diary to track what you eat. Also record when you have symptoms and what they are. There are phone apps that can help, or you can just write it down.  A food diary can help you figure out if certain foods trigger symptoms and if cutting out certain foods helps.  When you make changes to your diet, plan on it taking about 6 weeks to know if the changes help.  To reduce pain, gas, and bloating:  Try adding soluble fiber every day. This is the kind that dissolves in water. Some foods with soluble fiber are oats and fruit without skin. Some supplements you can try are Benefiber and Citrucel.  Try a low-FODMAP diet. FODMAPs are types of carbohydrates that can make IBS symptoms worse. Your doctor or a registered dietitian can help you with this diet.  To reduce constipation:  Talk to your doctor or a dietitian about whether you should increase how much fiber you eat. If they suggest more fiber:  Try soluble fiber first.  If they recommend more insoluble fiber, go slow. Add a little bit at a time. Insoluble fiber is in fruits and vegetables with skin, most whole grains, and beans.  Drink plenty of fluids. If you have kidney, heart, or liver disease and have to limit fluids, talk with your doctor before you increase the amount of fluids you drink.  Get some exercise every day. Build up slowly to 30 to 60 minutes a day on 5 or more days of the week.  Schedule time each day for a bowel movement. Having a daily routine may help. Take your time and do not strain when having a bowel movement.  To reduce diarrhea, limit or avoid:  Alcohol.  Caffeine, which is found in coffee, tea, cola  "drinks, energy drinks, and chocolate.  Nicotine from smoking or chewing tobacco.  Gas-producing foods, such as beans, broccoli, cabbage, or apples.  Dairy products that contain lactose (milk sugar), such as ice cream or milk.  Foods and drinks high in sugar, especially fruit juice, soda, candy, and other packaged sweets (such as cookies).  Foods high in fat, including frey, sausage, butter, oils, and anything deep-fried.  Sugar alcohols like sorbitol, xylitol, mannitol, and isomalt. These are artificial sweeteners found in some sugarless candies and chewing gum.  Take medicines exactly as directed.  If you are in counseling to help with pain, follow your treatment plan carefully.  If you are getting physical therapy to help with your bowel movements, make sure you do your home exercises.  If stress makes your symptoms worse, look for ways to reduce stress.  When should you call for help?   Call your doctor now or seek immediate medical care if:    Your pain is different than usual or occurs with fever.     You lose weight without trying, or you lose your appetite and you do not know why.     Your symptoms often wake you from sleep.     Your stools are black and tarlike or have streaks of blood.   Watch closely for changes in your health, and be sure to contact your doctor if:    Your IBS symptoms get worse or begin to disrupt your day-to-day life.     You become more tired than usual.     Your home treatment stops working.   Where can you learn more?  Go to https://www.PulseOn.net/patiented  Enter Y447 in the search box to learn more about \"Irritable Bowel Syndrome: Care Instructions.\"  Current as of: October 19, 2023  Content Version: 14.2 2024 [employer] [employer].   Care instructions adapted under license by your healthcare professional. If you have questions about a medical condition or this instruction, always ask your healthcare professional. [employer] disclaims any warranty or " liability for your use of this information.           Patient Education  Diet for Irritable Bowel Syndrome: Care Instructions  Overview     Irritable bowel syndrome, or IBS, is a problem with the intestines. IBS can cause belly pain, bloating, gas, constipation, and diarrhea. Most people can control their symptoms by changing their diet and easing stress.  No specific foods cause everyone with IBS to have symptoms. Many people find that they feel better by limiting or eliminating foods that may bring on symptoms. Make sure you don't stop eating all foods from any one food group without talking with a dietitian. You need to make sure you are still getting all the nutrients you need.  Follow-up care is a key part of your treatment and safety. Be sure to make and go to all appointments, and call your doctor if you are having problems. It's also a good idea to know your test results and keep a list of the medicines you take.  How can you care for yourself at home?  Keep track of foods and symptoms  Keep a food diary to track what you eat. Also record when you have symptoms and what they are. There are phone apps that can help, or you can just write it down.  A food diary can help you figure out if certain foods trigger symptoms and if cutting out certain foods helps.  When you make changes to your diet, plan on it taking about 6 weeks to know if the changes help.  For pain, gas, and bloating  Try adding soluble fiber every day. This is the kind that dissolves in water. Some foods with soluble fiber are oats and fruit without skin. Some supplements you can try are Benefiber and Citrucel.  Try a low-FODMAP diet. FODMAPs are types of carbohydrates that can make IBS symptoms worse. Your doctor or a registered dietitian can help you with this diet.  For constipation  Talk to your doctor or a dietitian about whether you should increase how much fiber you eat. If they suggest more fiber:  Try soluble fiber first.  If they  "recommend more insoluble fiber, go slow. Add a little bit at a time. Insoluble fiber is in fruits and vegetables with skin, most whole grains, and beans.  Drink plenty of fluids. If you have kidney, heart, or liver disease and have to limit fluids, talk with your doctor before you increase the amount of fluids you drink.  Get some exercise every day. Build up slowly to 30 to 60 minutes a day on 5 or more days of the week.  Schedule time each day for a bowel movement. Having a daily routine may help. Take your time and do not strain when having a bowel movement.  For diarrhea  You may try giving up foods or drinks one at a time to see whether symptoms improve. Limit or avoid the following:  Alcohol  Caffeine, which is found in coffee, tea, cola drinks, energy drinks, and chocolate  Nicotine, from smoking or chewing tobacco  Gas-producing foods, such as beans, broccoli, cabbage, and apples  Dairy products that contain lactose (milk sugar), such as ice cream and milk  Foods and drinks high in sugar, especially fruit juice, soda, candy, and other packaged sweets (such as cookies)  Foods high in fat, including frey, sausage, butter, oils, and anything deep-fried  Sugar alcohols like sorbitol, xylitol, mannitol, and isomalt. These are artificial sweeteners found in some sugarless candies and chewing gum.  Where can you learn more?  Go to https://www.[employer].net/patiented  Enter I783 in the search box to learn more about \"Diet for Irritable Bowel Syndrome: Care Instructions.\"  Current as of: September 20, 2023  Content Version: 14.2 2024 [facility] [employer].   Care instructions adapted under license by your healthcare professional. If you have questions about a medical condition or this instruction, always ask your healthcare professional. Healthwise, Incorporated disclaims any warranty or liability for your use of this information.           No follow-ups on file.    Juan David Harper is a 34 year old, " presenting for the following health issues:  PCOS (Follow up)        11/15/2024     8:26 AM   Additional Questions   Roomed by Milka KELLEY   Accompanied by self         11/15/2024     8:26 AM   Patient Reported Additional Medications   Patient reports taking the following new medications None     History of Present Illness       Hypothyroidism:     Since last visit, patient describes the following symptoms::  Anxiety, Depression and Loose stools    Reason for visit:  PCOS    She eats 2-3 servings of fruits and vegetables daily.She consumes 0 sweetened beverage(s) daily.She exercises with enough effort to increase her heart rate 30 to 60 minutes per day.  She exercises with enough effort to increase her heart rate 3 or less days per week.   She is taking medications regularly.     Clinic Visit  Date of visit: 2024   Chief Complaint:   Chief Complaint   Patient presents with    PCOS     Follow up       HPI:   Meredith Gordon is a 34 year old  female who presents to clinic today for follow up  for PCOS .     Menarche: age 14  Menses: irregular . Patient's last menstrual period was 2024 (exact date).   Family Planning Chart: No.  Acne: No.  Hirsutism (facial hair): No.  Male-pattern hair loss: No.  Elevated serum androgen:   Lab Results   Component Value Date    DHEA 283 2023    TESTOSTTOTAL 25 2023    FT 0.31 2023    JOVITA 1.900 2023   ]  BMI: Body mass index is 31.75 kg/m .   Type 2 DM: No.  Elevated Cholesterol: No.  Mood disorder: Yes: anxiety and depression .    Gynecologic History:  Last Pap:   Lab Results   Component Value Date    PAP NIL 2016      History of abnormal pap: No.    Obstetric History:   OB History    Para Term  AB Living   0 0 0 0 0 0   SAB IAB Ectopic Multiple Live Births   0 0 0 0 0     Obstetric history significant for:  N/A    Medications:  Current Outpatient Medications   Medication Sig Dispense Refill    acetaZOLAMIDE (DIAMOX  SEQUEL) 500 MG 12 hr capsule Take 1 Capsule by mouth two times a day. 60 capsule 2    Acetylcysteine, Nutrient, 600 MG TABS Take 600 mg by mouth 2 times daily      cabergoline (DOSTINEX) 0.5 MG tablet Take 1 tablet (0.5 mg) by mouth once a week 12 tablet 1    empagliflozin (JARDIANCE) 25 MG TABS tablet Take 1 tablet (25 mg) by mouth daily. 90 tablet 1    FLUoxetine (PROZAC) 10 MG capsule Take 3 capsules (30 mg) by mouth daily 270 capsule 1    Inositol POWD 2,000 mg 2 times daily      levothyroxine (SYNTHROID/LEVOTHROID) 125 MCG tablet Take 1 tablet (125 mcg) by mouth daily 90 tablet 2    liothyronine (CYTOMEL) 5 MCG tablet Take 1 Tablet by mouth two times a day. 180 tablet 1    traZODone (DESYREL) 50 MG tablet Take 1 Tablet by mouth at bedtime. 90 tablet 1    triamcinolone (KENALOG) 0.1 % external cream Apply  topically 2 (two) times a day. 45 g 1    vitamin D3 (CHOLECALCIFEROL) 2000 units (50 mcg) tablet Take 2 tablets (4,000 Units) by mouth daily 90 tablet 1     No current facility-administered medications for this visit.       Allergy:  Allergies   Allergen Reactions    Dogs Angioedema and Itching    Cats Itching     Cat/feline product derivatives       Medical History:  Past Medical History:   Diagnosis Date    2019 novel coronavirus disease (COVID-19) 08/2021    B12 deficiency 06/25/2012    resolved    Hashimoto's disease 09/02/2011    Insulin resistance 2023    Mild scoliosis 2022    Nephrolithiasis     2011 in [facility] (at [facility])    PCOS (polycystic ovarian syndrome)     Recurrent major depressive disorder, in partial remission (H)        Surgical History:  Past Surgical History:   Procedure Laterality Date    EXAM UNDER ANESTHESIA RECTUM N/A 8/9/2022    Procedure: EXAM UNDER ANESTHESIA, RECTUM;  Surgeon: Denilson Singh MD;  Location: [facility]    HEMORRHOIDECTOMY EXTERNAL N/A 8/9/2022    Procedure: Hemorrhoidectomy;  Surgeon: Denilson Singh MD;  Location: [facility]    MAMMOPLASTY REDUCTION  2015     "wisdom teeth         Social History:  Social History    Substance and Sexual Activity      Alcohol use: Yes        Comment: Occasional    History   Smoking Status    Never   Smokeless Tobacco    Never     History   Drug Use Unknown     History   Sexual Activity    Sexual activity: Never       Review of Systems  Constitutional, neuro, ENT, endocrine, pulmonary, cardiac, gastrointestinal, genitourinary, musculoskeletal, integument and psychiatric systems are negative, except as otherwise noted.      Objective    /74 (BP Location: Right arm, Patient Position: Sitting, Cuff Size: Adult Regular)   Pulse 74   Temp 97  F (36.1  C) (Tympanic)   Ht 1.651 m (5' 5\")   Wt 86.5 kg (190 lb 12.8 oz)   LMP 11/08/2024 (Exact Date)   SpO2 98%   BMI 31.75 kg/m    Body mass index is 31.75 kg/m .  Physical Exam   GENERAL: alert and no distress  RESP: lungs clear to auscultation - no rales, rhonchi or wheezes  CV: regular rate and rhythm, normal S1 S2, no S3 or S4, no murmur, click or rub, no peripheral edema  ABDOMEN: soft, nontender, no hepatosplenomegaly, no masses and bowel sounds normal  MS: no gross musculoskeletal defects noted, no edema  PSYCH: mentation appears normal, affect normal/bright    No results found for any visits on 11/15/24.        Signed Electronically by: Jovana Lambert MD    "

## 2024-11-15 ENCOUNTER — OFFICE VISIT (OUTPATIENT)
Dept: FAMILY MEDICINE | Facility: OTHER | Age: 34
End: 2024-11-15
Attending: FAMILY MEDICINE
Payer: COMMERCIAL

## 2024-11-15 VITALS
BODY MASS INDEX: 31.79 KG/M2 | WEIGHT: 190.8 LBS | TEMPERATURE: 97 F | HEIGHT: 65 IN | DIASTOLIC BLOOD PRESSURE: 74 MMHG | HEART RATE: 74 BPM | SYSTOLIC BLOOD PRESSURE: 114 MMHG | OXYGEN SATURATION: 98 %

## 2024-11-15 DIAGNOSIS — R79.89 ELEVATED PROLACTIN LEVEL: ICD-10-CM

## 2024-11-15 DIAGNOSIS — E22.1 HYPERPROLACTINEMIA (H): ICD-10-CM

## 2024-11-15 DIAGNOSIS — K58.0 IRRITABLE BOWEL SYNDROME WITH DIARRHEA: ICD-10-CM

## 2024-11-15 DIAGNOSIS — E28.2 PCOS (POLYCYSTIC OVARIAN SYNDROME): Primary | ICD-10-CM

## 2024-11-15 DIAGNOSIS — E06.3 HYPOTHYROIDISM DUE TO HASHIMOTO'S THYROIDITIS: ICD-10-CM

## 2024-11-15 PROCEDURE — 99214 OFFICE O/P EST MOD 30 MIN: CPT | Performed by: FAMILY MEDICINE

## 2024-11-15 PROCEDURE — G2211 COMPLEX E/M VISIT ADD ON: HCPCS | Performed by: FAMILY MEDICINE

## 2024-11-15 RX ORDER — NALTREXONE 100 %
POWDER (GRAM) MISCELLANEOUS
Qty: 100 G | Refills: 1 | Status: SHIPPED | OUTPATIENT
Start: 2024-11-15 | End: 2024-12-06

## 2024-11-15 ASSESSMENT — PAIN SCALES - GENERAL: PAINLEVEL_OUTOF10: NO PAIN (0)

## 2024-11-15 NOTE — PATIENT INSTRUCTIONS
Karla cordova    [facility] pavan ([employer])      [facility] - [place] SI[place]EL, CHICA Guillermo   [phone]     Patient Education   Irritable Bowel Syndrome: Care Instructions  Overview  Irritable bowel syndrome, or IBS, is a problem with the intestines that causes belly pain, bloating, gas, constipation, and diarrhea. The cause of IBS is not well known. IBS can last for many years, but it does not get worse over time or lead to serious disease.  Most people can control their symptoms by changing their diet and avoiding things that make their symptoms worse.  Follow-up care is a key part of your treatment and safety. Be sure to make and go to all appointments, and call your doctor if you are having problems. It's also a good idea to know your test results and keep a list of the medicines you take.  How can you care for yourself at home?  Keep track of foods and symptoms.  Keep a food diary to track what you eat. Also record when you have symptoms and what they are. There are phone apps that can help, or you can just write it down.  A food diary can help you figure out if certain foods trigger symptoms and if cutting out certain foods helps.  When you make changes to your diet, plan on it taking about 6 weeks to know if the changes help.  To reduce pain, gas, and bloating:  Try adding soluble fiber every day. This is the kind that dissolves in water. Some foods with soluble fiber are oats and fruit without skin. Some supplements you can try are Benefiber and Citrucel.  Try a low-FODMAP diet. FODMAPs are types of carbohydrates that can make IBS symptoms worse. Your doctor or a registered dietitian can help you with this diet.  To reduce constipation:  Talk to your doctor or a dietitian about whether you should increase how much fiber you eat. If they suggest more fiber:  Try soluble fiber first.  If they recommend more insoluble fiber, go slow. Add a little bit at a time. Insoluble fiber is  in fruits and vegetables with skin, most whole grains, and beans.  Drink plenty of fluids. If you have kidney, heart, or liver disease and have to limit fluids, talk with your doctor before you increase the amount of fluids you drink.  Get some exercise every day. Build up slowly to 30 to 60 minutes a day on 5 or more days of the week.  Schedule time each day for a bowel movement. Having a daily routine may help. Take your time and do not strain when having a bowel movement.  To reduce diarrhea, limit or avoid:  Alcohol.  Caffeine, which is found in coffee, tea, cola drinks, energy drinks, and chocolate.  Nicotine from smoking or chewing tobacco.  Gas-producing foods, such as beans, broccoli, cabbage, or apples.  Dairy products that contain lactose (milk sugar), such as ice cream or milk.  Foods and drinks high in sugar, especially fruit juice, soda, candy, and other packaged sweets (such as cookies).  Foods high in fat, including frey, sausage, butter, oils, and anything deep-fried.  Sugar alcohols like sorbitol, xylitol, mannitol, and isomalt. These are artificial sweeteners found in some sugarless candies and chewing gum.  Take medicines exactly as directed.  If you are in counseling to help with pain, follow your treatment plan carefully.  If you are getting physical therapy to help with your bowel movements, make sure you do your home exercises.  If stress makes your symptoms worse, look for ways to reduce stress.  When should you call for help?   Call your doctor now or seek immediate medical care if:    Your pain is different than usual or occurs with fever.     You lose weight without trying, or you lose your appetite and you do not know why.     Your symptoms often wake you from sleep.     Your stools are black and tarlike or have streaks of blood.   Watch closely for changes in your health, and be sure to contact your doctor if:    Your IBS symptoms get worse or begin to disrupt your day-to-day life.      "You become more tired than usual.     Your home treatment stops working.   Where can you learn more?  Go to https://www.iHealthHome.net/patiented  Enter Y447 in the search box to learn more about \"Irritable Bowel Syndrome: Care Instructions.\"  Current as of: October 19, 2023  Content Version: 14.2 2024 [employer].   Care instructions adapted under license by your healthcare professional. If you have questions about a medical condition or this instruction, always ask your healthcare professional. [employer] disclaims any warranty or liability for your use of this information.           Patient Education   Diet for Irritable Bowel Syndrome: Care Instructions  Overview     Irritable bowel syndrome, or IBS, is a problem with the intestines. IBS can cause belly pain, bloating, gas, constipation, and diarrhea. Most people can control their symptoms by changing their diet and easing stress.  No specific foods cause everyone with IBS to have symptoms. Many people find that they feel better by limiting or eliminating foods that may bring on symptoms. Make sure you don't stop eating all foods from any one food group without talking with a dietitian. You need to make sure you are still getting all the nutrients you need.  Follow-up care is a key part of your treatment and safety. Be sure to make and go to all appointments, and call your doctor if you are having problems. It's also a good idea to know your test results and keep a list of the medicines you take.  How can you care for yourself at home?  Keep track of foods and symptoms  Keep a food diary to track what you eat. Also record when you have symptoms and what they are. There are phone apps that can help, or you can just write it down.  A food diary can help you figure out if certain foods trigger symptoms and if cutting out certain foods helps.  When you make changes to your diet, plan on it taking about 6 weeks to know if the changes " help.  For pain, gas, and bloating  Try adding soluble fiber every day. This is the kind that dissolves in water. Some foods with soluble fiber are oats and fruit without skin. Some supplements you can try are Benefiber and Citrucel.  Try a low-FODMAP diet. FODMAPs are types of carbohydrates that can make IBS symptoms worse. Your doctor or a registered dietitian can help you with this diet.  For constipation  Talk to your doctor or a dietitian about whether you should increase how much fiber you eat. If they suggest more fiber:  Try soluble fiber first.  If they recommend more insoluble fiber, go slow. Add a little bit at a time. Insoluble fiber is in fruits and vegetables with skin, most whole grains, and beans.  Drink plenty of fluids. If you have kidney, heart, or liver disease and have to limit fluids, talk with your doctor before you increase the amount of fluids you drink.  Get some exercise every day. Build up slowly to 30 to 60 minutes a day on 5 or more days of the week.  Schedule time each day for a bowel movement. Having a daily routine may help. Take your time and do not strain when having a bowel movement.  For diarrhea  You may try giving up foods or drinks one at a time to see whether symptoms improve. Limit or avoid the following:  Alcohol  Caffeine, which is found in coffee, tea, cola drinks, energy drinks, and chocolate  Nicotine, from smoking or chewing tobacco  Gas-producing foods, such as beans, broccoli, cabbage, and apples  Dairy products that contain lactose (milk sugar), such as ice cream and milk  Foods and drinks high in sugar, especially fruit juice, soda, candy, and other packaged sweets (such as cookies)  Foods high in fat, including frey, sausage, butter, oils, and anything deep-fried  Sugar alcohols like sorbitol, xylitol, mannitol, and isomalt. These are artificial sweeteners found in some sugarless candies and chewing gum.  Where can you learn more?  Go to  "https://www.[employer].net/patiented  Enter I783 in the search box to learn more about \"Diet for Irritable Bowel Syndrome: Care Instructions.\"  Current as of: September 20, 2023  Content Version: 14.2 2024 [facility] [employer], [facility].   Care instructions adapted under license by your healthcare professional. If you have questions about a medical condition or this instruction, always ask your healthcare professional. Healthwise, Inc[employer] disclaims any warranty or liability for your use of this information.         "

## 2024-11-18 ENCOUNTER — LAB (OUTPATIENT)
Dept: LAB | Facility: OTHER | Age: 34
End: 2024-11-18
Payer: COMMERCIAL

## 2024-11-18 DIAGNOSIS — E06.3 HYPOTHYROIDISM DUE TO HASHIMOTO'S THYROIDITIS: ICD-10-CM

## 2024-11-18 DIAGNOSIS — R79.89 ELEVATED PROLACTIN LEVEL: ICD-10-CM

## 2024-11-18 LAB
PROLACTIN SERPL 3RD IS-MCNC: 36 NG/ML (ref 5–23)
T3 SERPL-MCNC: 109 NG/DL (ref 85–202)
T3FREE SERPL-MCNC: 3.2 PG/ML (ref 2–4.4)
T4 FREE SERPL-MCNC: 1.22 NG/DL (ref 0.9–1.7)
TSH SERPL DL<=0.005 MIU/L-ACNC: 0.03 UIU/ML (ref 0.3–4.2)

## 2024-11-18 PROCEDURE — 84443 ASSAY THYROID STIM HORMONE: CPT

## 2024-11-18 PROCEDURE — 84439 ASSAY OF FREE THYROXINE: CPT

## 2024-11-18 PROCEDURE — 84481 FREE ASSAY (FT-3): CPT

## 2024-11-18 PROCEDURE — 84146 ASSAY OF PROLACTIN: CPT

## 2024-11-18 PROCEDURE — 36415 COLL VENOUS BLD VENIPUNCTURE: CPT

## 2024-11-19 RX ORDER — CABERGOLINE 0.5 MG/1
0.5 TABLET ORAL WEEKLY
Qty: 12 TABLET | Refills: 1 | Status: SHIPPED | OUTPATIENT
Start: 2024-11-19

## 2024-11-25 ENCOUNTER — MYC MEDICAL ADVICE (OUTPATIENT)
Dept: FAMILY MEDICINE | Facility: OTHER | Age: 34
End: 2024-11-25

## 2024-11-25 NOTE — TELEPHONE ENCOUNTER
Patient called back pod 2 LPN line, writer relayed the previous message to patient and scheduled a lab appointment, patient has no further questions at this time.

## 2024-12-09 DIAGNOSIS — F33.0 MILD EPISODE OF RECURRENT MAJOR DEPRESSIVE DISORDER (H): ICD-10-CM

## 2024-12-09 RX ORDER — FLUOXETINE 10 MG/1
CAPSULE ORAL
Qty: 270 CAPSULE | Refills: 1 | Status: SHIPPED | OUTPATIENT
Start: 2024-12-09

## 2024-12-09 NOTE — TELEPHONE ENCOUNTER
Prozac       Last Written Prescription Date:  5/30/2024  Last Fill Quantity: 270,   # refills: 1  Last Office Visit: 11/15/2024  Future Office visit:    Next 5 appointments (look out 90 days)      Feb 17, 2025 9:00 AM  (Arrive by 8:45 AM)  Provider Visit with Jovana Lambert MD  [facility] [place] ([facility] [place] ) [address]  [place] [address]  [phone]            Pt from [facility] group home, pt is nonverbal and hx of dementia, Patient with left leg swelling since last night, also with left heel ulcer x 3 weeks, follows with wound care. Pt also has not urinated in 36 hours as per group home staff.

## 2024-12-21 ENCOUNTER — MYC MEDICAL ADVICE (OUTPATIENT)
Dept: FAMILY MEDICINE | Facility: OTHER | Age: 34
End: 2024-12-21

## 2024-12-22 ENCOUNTER — MYC REFILL (OUTPATIENT)
Dept: FAMILY MEDICINE | Facility: OTHER | Age: 34
End: 2024-12-22

## 2024-12-22 DIAGNOSIS — E06.3 HYPOTHYROIDISM DUE TO HASHIMOTO'S THYROIDITIS: ICD-10-CM

## 2024-12-23 RX ORDER — LIOTHYRONINE SODIUM 5 UG/1
5 TABLET ORAL 2 TIMES DAILY
Qty: 180 TABLET | Refills: 1 | Status: SHIPPED | OUTPATIENT
Start: 2024-12-23

## 2024-12-23 NOTE — TELEPHONE ENCOUNTER
Liothyronine 5 mcg      Last Written Prescription Date:  06/17/2024  Last Fill Quantity: 180,   # refills: 1  Last Office Visit: 11/15/2024  Future Office visit:    Next 5 appointments (look out 90 days)      Feb 17, 2025 9:00 AM  (Arrive by 8:45 AM)  Provider Visit with Jovana Lambert MD  [facility] [place] ([facility] [place] ) [address]  [place] [address]  [phone]             Routing refill request to provider for review/approval because:  Thyroid Protocol Failed    Rerun Protocol (12/22/2024 8:12 PM)    Normal TSH on file in past 12 months        Recent Labs   Lab Test 11/18/24  0736   TSH 0.03*

## 2025-01-20 ENCOUNTER — OFFICE VISIT (OUTPATIENT)
Dept: FAMILY MEDICINE | Facility: OTHER | Age: 35
End: 2025-01-20
Attending: FAMILY MEDICINE
Payer: COMMERCIAL

## 2025-01-20 VITALS
HEART RATE: 87 BPM | WEIGHT: 183 LBS | OXYGEN SATURATION: 99 % | BODY MASS INDEX: 30.49 KG/M2 | SYSTOLIC BLOOD PRESSURE: 118 MMHG | TEMPERATURE: 98 F | HEIGHT: 65 IN | DIASTOLIC BLOOD PRESSURE: 72 MMHG

## 2025-01-20 DIAGNOSIS — H47.10 PAPILLEDEMA OF BOTH EYES: ICD-10-CM

## 2025-01-20 DIAGNOSIS — F33.2 SEVERE EPISODE OF RECURRENT MAJOR DEPRESSIVE DISORDER, WITHOUT PSYCHOTIC FEATURES (H): ICD-10-CM

## 2025-01-20 DIAGNOSIS — R45.851 SUICIDAL IDEATION: ICD-10-CM

## 2025-01-20 DIAGNOSIS — F41.1 GAD (GENERALIZED ANXIETY DISORDER): Primary | ICD-10-CM

## 2025-01-20 PROCEDURE — G2211 COMPLEX E/M VISIT ADD ON: HCPCS | Performed by: FAMILY MEDICINE

## 2025-01-20 PROCEDURE — 99215 OFFICE O/P EST HI 40 MIN: CPT | Performed by: FAMILY MEDICINE

## 2025-01-20 RX ORDER — ACETAZOLAMIDE 500 MG/1
500 CAPSULE, EXTENDED RELEASE ORAL 2 TIMES DAILY
Qty: 60 CAPSULE | Refills: 2 | Status: SHIPPED | OUTPATIENT
Start: 2025-01-20

## 2025-01-20 RX ORDER — VENLAFAXINE HYDROCHLORIDE 37.5 MG/1
37.5 CAPSULE, EXTENDED RELEASE ORAL DAILY
Qty: 30 CAPSULE | Refills: 1 | Status: SHIPPED | OUTPATIENT
Start: 2025-01-20

## 2025-01-20 ASSESSMENT — PAIN SCALES - GENERAL: PAINLEVEL_OUTOF10: NO PAIN (0)

## 2025-01-20 ASSESSMENT — PATIENT HEALTH QUESTIONNAIRE - PHQ9
SUM OF ALL RESPONSES TO PHQ QUESTIONS 1-9: 22
SUM OF ALL RESPONSES TO PHQ QUESTIONS 1-9: 22
10. IF YOU CHECKED OFF ANY PROBLEMS, HOW DIFFICULT HAVE THESE PROBLEMS MADE IT FOR YOU TO DO YOUR WORK, TAKE CARE OF THINGS AT HOME, OR GET ALONG WITH OTHER PEOPLE: VERY DIFFICULT

## 2025-01-20 NOTE — PATIENT INSTRUCTIONS
Ceci Rudolph -- self compassion/self love/self care    The assertiveness of women    Boundaries by cloud and preciado    Magnesium glycinate 600-800mg 1 hr before bed    Kristi reyes PhD -- website    [facility]    Psychologists/ Counselors            [place]  **5. [facility]              . ..[phone]  **[facility]                 ... [phone]  **4. [facility]...............................................................[phone]              [place]  2. [facility]         .     .  [phone]  3. [facility]              .  ..  [phone]                 [facility]....................................................[phone]       *Facilities in bold italics indicate medication management  Services are offered.    *Many places offer telehealth/ virtual services, some may need initial intake  Appointment done in person before telehealth can be established.     Crisis support    If you or someone you know is struggling or in mental health crisis, help is available.  Call or text 141 or chat [email or web address]    The volunteer Crisis Counselor will help you move from a 'hot moment to a cool moment'     FOR HOMELESSNESS OR HOUSING CRISIS CALL 211  **For LOCAL homelessness, food, and other assistance, you can contact:    [facility] in [place]: [phone]  [facility]: [phone]  [facility]: [phone] / [phone]  Call 211 for homelessness assistance in the [facility] shelter: [phone]  Housing crisis center: [phone] / [phone] ext [phone]  [place] [facility]: [phone]  [facility]: [phone]  [facility]: [phone] / [phone]  [place]: [phone]

## 2025-01-20 NOTE — PROGRESS NOTES
The longitudinal plan of care for the diagnosis(es)/condition(s) as documented were addressed during this visit. Due to the added complexity in care, I will continue to support Meredith in the subsequent management and with ongoing continuity of care.    Assessment & Plan     ROBBIE (generalized anxiety disorder)  Discussed in detail coping skills, medication addition, help for sleep  New therapist, she has good support in her sister Raegan  Difficulties with work at times, romantic relationship not reciprocated, some ACEs  Endorses for safety, reports she wouldn't end her life second to not wanting to leave that way for her nieces and nephews to know  - venlafaxine (EFFEXOR XR) 37.5 MG 24 hr capsule; Take 1 capsule (37.5 mg) by mouth daily.    Severe episode of recurrent major depressive disorder, without psychotic features (H)  As above, add effexor to her prozac  - venlafaxine (EFFEXOR XR) 37.5 MG 24 hr capsule; Take 1 capsule (37.5 mg) by mouth daily.    Suicidality -- contracts for safety, and discussed above  Follow-up 4 wks, discussed follow-up in one-two weeks but she feels ok to wait 4 weeks    Patient was agreeable to this plan and had no further questions.  Patient Instructions   Ceci Rudolph -- self compassion/self love/self care    The assertiveness of women    Boundaries by cloud and preciado    Magnesium glycinate 600-800mg 1 hr before bed    Kristi reyes PhD -- website    [facility]    Psychologists/ Counselors            [place]  **5. [facility]              . ..[phone]  **[facility]                 ... [phone]  **4. [facility]...............................................................[phone]              [place]  2. [facility]         .     .  [phone]  3. [facility]              .  ..  [phone]                 [facility]....................................................[phone]        *Facilities in bold italics indicate medication management  Services are offered.    *Many places offer telehealth/ virtual services, some may need initial intake  Appointment done in person before telehealth can be established.     Crisis support    If you or someone you know is struggling or in mental health crisis, help is available.  Call or text 976 or chat [email or web address]    The volunteer Crisis Counselor will help you move from a 'hot moment to a cool moment'     FOR HOMELESSNESS OR HOUSING CRISIS CALL 211  **For LOCAL homelessness, food, and other assistance, you can contact:    [facility] of support in [place]: [phone]  [facility]: [phone]  [facility]: [phone] / [phone]  Call 211 for homelessness assistance in [facility] shelter: [phone]  Housing crisis center: [phone] / [phone] ext [phone]  [place] [facility]: [phone]  [facility]: [phone]  [facility]: [phone] / [phone]  [facility]: [phone]  48 minutes spent by me on the date of the encounter doing chart review, history and exam, documentation and further activities per the note    No follow-ups on file.    Juan David Harper is a 34 year old, presenting for the following health issues:  No chief complaint on file.        1/20/2025     9:35 AM   Additional Questions   Roomed by Jessica RAMEY   Accompanied by self     History of Present Illness       Mental Health Follow-up:  Patient presents to follow-up on Depression.Patient's depression since last visit has been:  Worse  The patient is not having other symptoms associated with depression.      Any significant life events: No and relationship concerns  Patient is not feeling anxious or having panic attacks.  Patient has no concerns about alcohol or drug use.    She eats 2-3 servings of fruits and vegetables daily.She consumes 0 sweetened beverage(s) daily.She exercises with enough effort  to increase her heart rate 9 or less minutes per day.  She exercises with enough effort to increase her heart rate 3 or less days per week.   She is taking medications regularly.       Depression and Anxiety   How are you doing with your depression since your last visit? Worsened   How are you doing with your anxiety since your last visit?  No change  Are you having other symptoms that might be associated with depression or anxiety? Yes:  suicidal thoughts, uncontrollable crying, very little energy, not wanting to do anything, can't concentrate, sleeping too much, no appetite.   Have you had a significant life event? Relationship Concerns   Do you have any concerns with your use of alcohol or other drugs? No    Social History     Tobacco Use    Smoking status: Never    Smokeless tobacco: Never   Vaping Use    Vaping status: Never Used   Substance Use Topics    Alcohol use: Yes     Comment: Occasional    Drug use: Never         6/26/2024     9:51 AM 1/19/2025    12:13 PM 1/20/2025     9:07 AM   PHQ   PHQ-9 Total Score 2 20  22    Q9: Thoughts of better off dead/self-harm past 2 weeks Not at all More than half the days More than half the days   F/U: Thoughts of suicide or self-harm  Yes Yes   F/U: Self harm-plan  No No   F/U: Self-harm action  No No   F/U: Safety concerns  No No       Patient-reported         5/30/2024     3:14 PM 9/11/2024    10:08 AM 1/19/2025    12:13 PM   ROBBIE-7 SCORE   Total Score 5 (mild anxiety) 5 (mild anxiety) 7 (mild anxiety)   Total Score 5 5 7        Patient-reported         1/20/2025     9:07 AM   Last PHQ-9   1.  Little interest or pleasure in doing things 3   2.  Feeling down, depressed, or hopeless 3   3.  Trouble falling or staying asleep, or sleeping too much 3   4.  Feeling tired or having little energy 3   5.  Poor appetite or overeating 3   6.  Feeling bad about yourself 2   7.  Trouble concentrating 3   8.  Moving slowly or restless 0   Q9: Thoughts of better off dead/self-harm  "past 2 weeks 2   PHQ-9 Total Score 22    In the past two weeks have you had thoughts of suicide or self harm? Yes   Do you have concerns about your personal safety or the safety of others? No   In the past 2 weeks have you thought about a plan or had intention to harm yourself? No   In the past 2 weeks have you acted on these thoughts in any way? No       Patient-reported       Suicide Assessment Five-step Evaluation and Treatment (SAFE-T)    Review of Systems  Constitutional, HEENT, cardiovascular, pulmonary, GI, , musculoskeletal, neuro, skin, endocrine and psych systems are negative, except as otherwise noted.      Objective    /72 (BP Location: Left arm, Patient Position: Sitting, Cuff Size: Adult Regular)   Pulse 87   Temp 98  F (36.7  C) (Tympanic)   Ht 1.651 m (5' 5\")   Wt 83 kg (183 lb)   LMP 12/31/2024 (Exact Date)   SpO2 99%   BMI 30.45 kg/m    Body mass index is 30.45 kg/m .  Physical Exam   GENERAL: alert, mild distress, and fatigued  PSYCH: mentation appears normal, affect flat, and tearful    No results found for any visits on 01/20/25.        Signed Electronically by: Jovana Lambert MD    "

## 2025-01-20 NOTE — TELEPHONE ENCOUNTER
Acetazolamide  Last Written Prescription Date: 10/14/24  Last Fill Quantity: 60 # of Refills: 2  Last Office Visit: 1/20/25

## 2025-01-24 ENCOUNTER — LAB (OUTPATIENT)
Dept: LAB | Facility: OTHER | Age: 35
End: 2025-01-24
Payer: COMMERCIAL

## 2025-01-24 DIAGNOSIS — R79.89 ELEVATED PROLACTIN LEVEL: ICD-10-CM

## 2025-01-24 LAB — PROLACTIN SERPL 3RD IS-MCNC: 2 NG/ML (ref 5–23)

## 2025-02-17 ENCOUNTER — APPOINTMENT (OUTPATIENT)
Dept: LAB | Facility: OTHER | Age: 35
End: 2025-02-17
Payer: COMMERCIAL

## 2025-02-17 ENCOUNTER — OFFICE VISIT (OUTPATIENT)
Dept: FAMILY MEDICINE | Facility: OTHER | Age: 35
End: 2025-02-17
Attending: FAMILY MEDICINE
Payer: COMMERCIAL

## 2025-02-17 VITALS
HEART RATE: 84 BPM | HEIGHT: 65 IN | OXYGEN SATURATION: 97 % | SYSTOLIC BLOOD PRESSURE: 102 MMHG | TEMPERATURE: 97 F | DIASTOLIC BLOOD PRESSURE: 76 MMHG | BODY MASS INDEX: 31.65 KG/M2 | WEIGHT: 190 LBS

## 2025-02-17 DIAGNOSIS — F33.2 SEVERE EPISODE OF RECURRENT MAJOR DEPRESSIVE DISORDER, WITHOUT PSYCHOTIC FEATURES (H): ICD-10-CM

## 2025-02-17 DIAGNOSIS — E06.3 HYPOTHYROIDISM DUE TO HASHIMOTO'S THYROIDITIS: ICD-10-CM

## 2025-02-17 DIAGNOSIS — E28.2 PCOS (POLYCYSTIC OVARIAN SYNDROME): Primary | ICD-10-CM

## 2025-02-17 LAB
T4 FREE SERPL-MCNC: 1.18 NG/DL (ref 0.9–1.7)
TSH SERPL DL<=0.005 MIU/L-ACNC: 0.02 UIU/ML (ref 0.3–4.2)

## 2025-02-17 PROCEDURE — 84480 ASSAY TRIIODOTHYRONINE (T3): CPT | Performed by: FAMILY MEDICINE

## 2025-02-17 PROCEDURE — 36415 COLL VENOUS BLD VENIPUNCTURE: CPT | Performed by: FAMILY MEDICINE

## 2025-02-17 PROCEDURE — 84439 ASSAY OF FREE THYROXINE: CPT | Performed by: FAMILY MEDICINE

## 2025-02-17 PROCEDURE — 84481 FREE ASSAY (FT-3): CPT | Performed by: FAMILY MEDICINE

## 2025-02-17 PROCEDURE — 99214 OFFICE O/P EST MOD 30 MIN: CPT | Performed by: FAMILY MEDICINE

## 2025-02-17 PROCEDURE — G2211 COMPLEX E/M VISIT ADD ON: HCPCS | Performed by: FAMILY MEDICINE

## 2025-02-17 PROCEDURE — 84443 ASSAY THYROID STIM HORMONE: CPT | Performed by: FAMILY MEDICINE

## 2025-02-17 RX ORDER — VENLAFAXINE HYDROCHLORIDE 75 MG/1
75 CAPSULE, EXTENDED RELEASE ORAL DAILY
Qty: 30 CAPSULE | Refills: 1 | Status: SHIPPED | OUTPATIENT
Start: 2025-02-17

## 2025-02-17 ASSESSMENT — PATIENT HEALTH QUESTIONNAIRE - PHQ9
SUM OF ALL RESPONSES TO PHQ QUESTIONS 1-9: 11
10. IF YOU CHECKED OFF ANY PROBLEMS, HOW DIFFICULT HAVE THESE PROBLEMS MADE IT FOR YOU TO DO YOUR WORK, TAKE CARE OF THINGS AT HOME, OR GET ALONG WITH OTHER PEOPLE: SOMEWHAT DIFFICULT
SUM OF ALL RESPONSES TO PHQ QUESTIONS 1-9: 11

## 2025-02-17 ASSESSMENT — PAIN SCALES - GENERAL: PAINLEVEL_OUTOF10: NO PAIN (0)

## 2025-02-17 NOTE — PROGRESS NOTES
The longitudinal plan of care for the diagnosis(es)/condition(s) as documented were addressed during this visit. Due to the added complexity in care, I will continue to support Meredith in the subsequent management and with ongoing continuity of care.    Assessment & Plan     PCOS (polycystic ovarian syndrome)  Doing ok with this     Hypothyroidism due to Hashimoto's thyroiditis  Pending T3 labs, may need to adjust synthroid  She reports weight gain but also hasn't been eating well  - TSH with free T4 reflex; Future  - T3, Free; Future  - T3, total; Future  - TSH with free T4 reflex  - T3, Free  - T3, total  - T4 free    Severe episode of recurrent major depressive disorder, without psychotic features (H)  Struggling with some situations, she feels like the effexor is helping, will increase to 75mg and follow-up in 6 weeks  Work on affirmations   - venlafaxine (EFFEXOR XR) 75 MG 24 hr capsule; Take 1 capsule (75 mg) by mouth daily.      Patient was agreeable to this plan and had no further questions.  Patient Instructions   Song by [employer] am They  'there is no [email or web address]'    Affirmations in the mirror several times a day    41 minutes spent by me on the date of the encounter doing chart review, history and exam, documentation and further activities per the note      No follow-ups on file.    Subjective   Meredith is a 34 year old, presenting for the following health issues:  pcos and Thyroid Problem        2/17/2025     9:07 AM   Additional Questions   Roomed by Jessica RAMEY   Accompanied by self         2/17/2025   Declines Weight   Did patient decline having their weight taken? Yes- took at home     History of Present Illness       Mental Health Follow-up:  Patient presents to follow-up on Depression.Patient's depression since last visit has been:  Medium  The patient is not having other symptoms associated with depression.      Any significant life events: No  Patient is not feeling anxious or having panic attacks.  Patient  has no concerns about alcohol or drug use.    Hypothyroidism:     Since last visit, patient describes the following symptoms::  Constipation, Depression, Dry skin, Fatigue and Weight gain    Weight gain::  5 lbs.    She eats 2-3 servings of fruits and vegetables daily.She consumes 0 sweetened beverage(s) daily.She exercises with enough effort to increase her heart rate 9 or less minutes per day.  She exercises with enough effort to increase her heart rate 3 or less days per week.   She is taking medications regularly.     Clinic Visit  Date of visit: 2025   Chief Complaint:   Chief Complaint   Patient presents with    pcos    Thyroid Problem       HPI:   Meredith Gordon is a 34 year old  female who presents to clinic today for follow up  for PCOS.     Menarche: age 14   Menses: irregular frequency of periods are irregular, some abnormally long periods.  . Patient's last menstrual period was 2025 (exact date).   Family Planning Chart: Yes: jessica for tracking period .  Acne: Yes: some about the same as usual.  Hirsutism (facial hair): Yes: small amount of chin .  Male-pattern hair loss: No.  Elevated serum androgen:   Lab Results   Component Value Date    DHEA 283 2023    TESTOSTTOTAL 25 2023    FT 0.31 2023    JOVITA 1.900 2023   ]  BMI: Body mass index is 31.62 kg/m .   Type 2 DM: No.    Gynecologic History:  Last Pap:   Lab Results   Component Value Date    PAP NIL 2016      History of abnormal pap: No.    Obstetric History:   OB History    Para Term  AB Living   0 0 0 0 0 0   SAB IAB Ectopic Multiple Live Births   0 0 0 0 0     Obstetric history significant for:  N/A    Medications:  Current Outpatient Medications   Medication Sig Dispense Refill    acetaZOLAMIDE (DIAMOX SEQUEL) 500 MG 12 hr capsule Take 1 Capsule by mouth two times a day. 60 capsule 2    Acetylcysteine, Nutrient, 600 MG TABS Take 600 mg by mouth 2 times daily      cabergoline (DOSTINEX)  0.5 MG tablet Take 1 tablet (0.5 mg) by mouth once a week. 12 tablet 1    empagliflozin (JARDIANCE) 25 MG TABS tablet Take 1 tablet (25 mg) by mouth daily. 90 tablet 1    FLUoxetine (PROZAC) 10 MG capsule Take 3 Capsules by mouth one time a day. 270 capsule 1    Inositol POWD 2,000 mg 2 times daily      levothyroxine (SYNTHROID/LEVOTHROID) 125 MCG tablet Take 1 tablet (125 mcg) by mouth daily 90 tablet 2    liothyronine (CYTOMEL) 5 MCG tablet Take 1 tablet (5 mcg) by mouth 2 times daily. 180 tablet 1    traZODone (DESYREL) 50 MG tablet Take 1 Tablet by mouth at bedtime. 90 tablet 1    triamcinolone (KENALOG) 0.1 % external cream Apply  topically 2 (two) times a day. 45 g 1    venlafaxine (EFFEXOR XR) 37.5 MG 24 hr capsule Take 1 capsule (37.5 mg) by mouth daily. 30 capsule 1    vitamin D3 (CHOLECALCIFEROL) 2000 units (50 mcg) tablet Take 2 tablets (4,000 Units) by mouth daily 90 tablet 1    Naltrexone POWD 1.5 mg at bedtime for 7 days, THEN 3 mg at bedtime for 7 days, THEN 4.5 mg at bedtime for 7 days. 100 g 1     No current facility-administered medications for this visit.       Allergy:  Allergies   Allergen Reactions    Dogs Angioedema and Itching    Cats Itching     Cat/feline product derivatives       Medical History:  Past Medical History:   Diagnosis Date    2019 novel coronavirus disease (COVID-19) 08/2021    B12 deficiency 06/25/2012    resolved    Hashimoto's disease 09/02/2011    Insulin resistance 2023    Mild scoliosis 2022    Nephrolithiasis     2011 in [facility] ([facility])    PCOS (polycystic ovarian syndrome)     Recurrent major depressive disorder, in partial remission        Surgical History:  Past Surgical History:   Procedure Laterality Date    EXAM UNDER ANESTHESIA RECTUM N/A 8/9/2022    Procedure: EXAM UNDER ANESTHESIA, RECTUM;  Surgeon: Denilson Singh MD;  Location: [facility]    HEMORRHOIDECTOMY EXTERNAL N/A 8/9/2022    Procedure: Hemorrhoidectomy;  Surgeon: Denilson Singh MD;   "Location: [facility]    MAMMOPLASTY REDUCTION  2015    wisdom teeth         Social History:  Social History    Substance and Sexual Activity      Alcohol use: Yes        Comment: Occasional    History   Smoking Status    Never   Smokeless Tobacco    Never     History   Drug Use Unknown     History   Sexual Activity    Sexual activity: Never     Relationship status is: Never been .    Hypothyroidism Follow-up    Since last visit, patient describes the following symptoms: Weight stable, no hair loss, no skin changes, no constipation, no loose stools, weight gain of 5-10 lbs, dry skin, constipation, depression, and fatigue      Review of Systems  Constitutional, HEENT, cardiovascular, pulmonary, GI, , musculoskeletal, neuro, skin, endocrine and psych systems are negative, except as otherwise noted.      Objective    /76 (BP Location: Right arm, Patient Position: Sitting, Cuff Size: Adult Regular)   Pulse 84   Temp 97  F (36.1  C) (Tympanic)   Ht 1.651 m (5' 5\")   Wt 86.2 kg (190 lb)   LMP 02/14/2025 (Exact Date)   SpO2 97%   BMI 31.62 kg/m    Body mass index is 31.62 kg/m .  Physical Exam   GENERAL: alert and no distress  RESP: lungs clear to auscultation - no rales, rhonchi or wheezes  CV: regular rate and rhythm, normal S1 S2, no S3 or S4, no murmur, click or rub, no peripheral edema  ABDOMEN: soft, nontender, no hepatosplenomegaly, no masses and bowel sounds normal  MS: no gross musculoskeletal defects noted, no edema  PSYCH: mentation appears normal, affect normal/bright, tearful, judgement and insight intact, and appearance well groomed    Results for orders placed or performed in visit on 02/17/25   TSH with free T4 reflex     Status: Abnormal   Result Value Ref Range    TSH 0.02 (L) 0.30 - 4.20 uIU/mL   T4 free     Status: Normal   Result Value Ref Range    Free T4 1.18 0.90 - 1.70 ng/dL           Signed Electronically by: Jovana Lambert MD    The longitudinal plan of care for the " diagnosis(es)/condition(s) as documented were addressed during this visit. Due to the added complexity in care, I will continue to support Meredith in the subsequent management and with ongoing continuity of care.    Answers submitted by the patient for this visit:  Patient Health Questionnaire (Submitted on 2/17/2025)  If you checked off any problems, how difficult have these problems made it for you to do your work, take care of things at home, or get along with other people?: Somewhat difficult  PHQ9 TOTAL SCORE: 11  Depression / Anxiety Questionnaire (Submitted on 2/17/2025)  Chief Complaint: Chronic problems general questions HPI Form  Depression/Anxiety: Depression  Hypothyroid  (Submitted on 2/17/2025)  Chief Complaint: Chronic problems general questions HPI Form  Since last visit, patient describes the following symptoms:: Constipation, Depression, Dry skin, Fatigue, Weight gain  Depression only (Submitted on 2/17/2025)  Chief Complaint: Chronic problems general questions HPI Form  Status since last visit:: medium  Other associated symptoms of depression:: No  Significant life event: : No  Anxious:: No  Current substance use:: No  Weight Gain (Submitted on 2/17/2025)  Chief Complaint: Chronic problems general questions HPI Form  Weight gain:: 5 lbs.  General Questionnaire (Submitted on 2/17/2025)  Chief Complaint: Chronic problems general questions HPI Form  How many servings of fruits and vegetables do you eat daily?: 2-3  On average, how many sweetened beverages do you drink each day (Examples: soda, juice, sweet tea, etc.  Do NOT count diet or artificially sweetened beverages)?: 0  How many minutes a day do you exercise enough to make your heart beat faster?: 9 or less  How many days a week do you exercise enough to make your heart beat faster?: 3 or less  How many days per week do you miss taking your medication?: 0  Questionnaire about: Chronic problems general questions HPI Form (Submitted on  2/17/2025)  Chief Complaint: Chronic problems general questions HPI Form

## 2025-02-18 LAB
T3 SERPL-MCNC: 118 NG/DL (ref 85–202)
T3FREE SERPL-MCNC: 3.3 PG/ML (ref 2–4.4)

## 2025-03-02 DIAGNOSIS — E06.3 HYPOTHYROIDISM DUE TO HASHIMOTO'S THYROIDITIS: ICD-10-CM

## 2025-03-03 RX ORDER — TRIAMCINOLONE ACETONIDE 1 MG/G
CREAM TOPICAL
Qty: 45 G | Refills: 1 | Status: SHIPPED | OUTPATIENT
Start: 2025-03-03

## 2025-03-09 DIAGNOSIS — E28.2 PCOS (POLYCYSTIC OVARIAN SYNDROME): ICD-10-CM

## 2025-03-10 RX ORDER — EMPAGLIFLOZIN 25 MG/1
25 TABLET, FILM COATED ORAL DAILY
Qty: 90 TABLET | Refills: 1 | Status: SHIPPED | OUTPATIENT
Start: 2025-03-10

## 2025-03-10 NOTE — TELEPHONE ENCOUNTER
Jardiance 25 MG Oral Tablet (empagliflozin)         Last Written Prescription Date:  9/11/24  Last Fill Quantity: 90,   # refills: 1  Last Office Visit: 2/17/25  Future Office visit:    Next 5 appointments (look out 90 days)      Apr 02, 2025 10:00 AM  (Arrive by 9:45 AM)  Provider Visit with Jovana Lambert MD  [facility] ([facility] ) [address]  [place] [address]  [phone]             Routing refill request to provider for review/approval because:    Sodium Glucose Co-Transport Inhibitor Agents [id number]/09/2025 08:08 PM   Protocol Details Patient has documented A1c within the specified period of time.    Medication indicated for associated diagnosis       Hemoglobin A1C   Date Value Ref Range Status   02/08/2024 5.1 <5.7 % Final     Comment:     Normal <5.7%   Prediabetes 5.7-6.4%    Diabetes 6.5% or higher     Note: Adopted from ADA consensus guidelines.

## 2025-03-24 DIAGNOSIS — E06.3 HYPOTHYROIDISM DUE TO HASHIMOTO'S THYROIDITIS: ICD-10-CM

## 2025-03-24 RX ORDER — LIOTHYRONINE SODIUM 5 UG/1
5 TABLET ORAL 2 TIMES DAILY
Qty: 180 TABLET | Refills: 1 | Status: SHIPPED | OUTPATIENT
Start: 2025-03-24

## 2025-03-24 NOTE — TELEPHONE ENCOUNTER
Rerun Protocol (3/24/2025 11:52 AM)    Normal TSH on file in past 12 months        Recent Labs   Lab Test 02/17/25  0903   TSH 0.02*

## 2025-03-24 NOTE — TELEPHONE ENCOUNTER
Cytomel  Last Written Prescription Date: 12/23/24  Last Fill Quantity: 180 # of Refills: 1  Last Office Visit: 2/17/25

## 2025-03-30 ASSESSMENT — ANXIETY QUESTIONNAIRES
5. BEING SO RESTLESS THAT IT IS HARD TO SIT STILL: NOT AT ALL
3. WORRYING TOO MUCH ABOUT DIFFERENT THINGS: SEVERAL DAYS
4. TROUBLE RELAXING: SEVERAL DAYS
8. IF YOU CHECKED OFF ANY PROBLEMS, HOW DIFFICULT HAVE THESE MADE IT FOR YOU TO DO YOUR WORK, TAKE CARE OF THINGS AT HOME, OR GET ALONG WITH OTHER PEOPLE?: NOT DIFFICULT AT ALL
1. FEELING NERVOUS, ANXIOUS, OR ON EDGE: SEVERAL DAYS
GAD7 TOTAL SCORE: 5
GAD7 TOTAL SCORE: 5
IF YOU CHECKED OFF ANY PROBLEMS ON THIS QUESTIONNAIRE, HOW DIFFICULT HAVE THESE PROBLEMS MADE IT FOR YOU TO DO YOUR WORK, TAKE CARE OF THINGS AT HOME, OR GET ALONG WITH OTHER PEOPLE: NOT DIFFICULT AT ALL
7. FEELING AFRAID AS IF SOMETHING AWFUL MIGHT HAPPEN: NOT AT ALL
GAD7 TOTAL SCORE: 5
7. FEELING AFRAID AS IF SOMETHING AWFUL MIGHT HAPPEN: NOT AT ALL
2. NOT BEING ABLE TO STOP OR CONTROL WORRYING: SEVERAL DAYS
6. BECOMING EASILY ANNOYED OR IRRITABLE: SEVERAL DAYS

## 2025-04-02 ENCOUNTER — OFFICE VISIT (OUTPATIENT)
Dept: FAMILY MEDICINE | Facility: OTHER | Age: 35
End: 2025-04-02
Attending: FAMILY MEDICINE
Payer: COMMERCIAL

## 2025-04-02 ENCOUNTER — LAB (OUTPATIENT)
Dept: LAB | Facility: OTHER | Age: 35
End: 2025-04-02
Payer: COMMERCIAL

## 2025-04-02 VITALS
BODY MASS INDEX: 32.15 KG/M2 | HEIGHT: 65 IN | SYSTOLIC BLOOD PRESSURE: 122 MMHG | RESPIRATION RATE: 16 BRPM | WEIGHT: 193 LBS | OXYGEN SATURATION: 100 % | HEART RATE: 84 BPM | TEMPERATURE: 97.8 F | DIASTOLIC BLOOD PRESSURE: 78 MMHG

## 2025-04-02 DIAGNOSIS — F41.1 GAD (GENERALIZED ANXIETY DISORDER): ICD-10-CM

## 2025-04-02 DIAGNOSIS — F33.2 SEVERE EPISODE OF RECURRENT MAJOR DEPRESSIVE DISORDER, WITHOUT PSYCHOTIC FEATURES (H): Primary | ICD-10-CM

## 2025-04-02 DIAGNOSIS — E28.2 PCOS (POLYCYSTIC OVARIAN SYNDROME): ICD-10-CM

## 2025-04-02 DIAGNOSIS — E06.3 HYPOTHYROIDISM DUE TO HASHIMOTO'S THYROIDITIS: ICD-10-CM

## 2025-04-02 DIAGNOSIS — E61.1 IRON DEFICIENCY: ICD-10-CM

## 2025-04-02 DIAGNOSIS — K59.01 SLOW TRANSIT CONSTIPATION: ICD-10-CM

## 2025-04-02 DIAGNOSIS — R79.89 ELEVATED PROLACTIN LEVEL: ICD-10-CM

## 2025-04-02 LAB
FERRITIN SERPL-MCNC: 29 NG/ML (ref 6–175)
HOLD SPECIMEN: NORMAL
IRON BINDING CAPACITY (ROCHE): 308 UG/DL (ref 240–430)
IRON SATN MFR SERPL: 26 % (ref 15–46)
IRON SERPL-MCNC: 79 UG/DL (ref 37–145)

## 2025-04-02 PROCEDURE — 36415 COLL VENOUS BLD VENIPUNCTURE: CPT

## 2025-04-02 PROCEDURE — 83540 ASSAY OF IRON: CPT

## 2025-04-02 PROCEDURE — 82728 ASSAY OF FERRITIN: CPT

## 2025-04-02 PROCEDURE — 83550 IRON BINDING TEST: CPT

## 2025-04-02 RX ORDER — VENLAFAXINE HYDROCHLORIDE 37.5 MG/1
37.5 CAPSULE, EXTENDED RELEASE ORAL DAILY
Qty: 30 CAPSULE | Refills: 1 | Status: SHIPPED | OUTPATIENT
Start: 2025-04-02 | End: 2025-04-02

## 2025-04-02 ASSESSMENT — PAIN SCALES - GENERAL: PAINLEVEL_OUTOF10: NO PAIN (0)

## 2025-04-02 ASSESSMENT — ENCOUNTER SYMPTOMS: NERVOUS/ANXIOUS: 1

## 2025-04-02 NOTE — PROGRESS NOTES
Assessment & Plan     Severe episode of recurrent major depressive disorder, without psychotic features (H)  She is doing better with the effexor 75mg, discussed adding extra 37.5mg but she is still on prozac and using trazodone at night  If she isn't improving, will consider reducing prozac and increase the effexor    ROBBIE (generalized anxiety disorder)  See above    PCOS (polycystic ovarian syndrome)  Cycles are improving    Hypothyroidism due to Hashimoto's thyroiditis  Stable thyroid    Iron deficiency  Stable labs   - Iron and iron binding capacity; Future  - Ferritin; Future    Elevated prolactin level  pending  - Prolactin    Slow transit constipation  Discussed natural calm mag citrate powder      49 minutes spent by me on the date of the encounter doing chart review, history and exam, documentation and further activities per the note      Patient was agreeable to this plan and had no further questions.  Patient Instructions    Natural calm powder -magnesium citrate -- start with 1/4 tsp per day    No follow-ups on file.    Juan David Harper is a 35 year old, presenting for the following health issues:  PCOS , Thyroid Problem, Depression, and Anxiety        4/2/2025     9:58 AM   Additional Questions   Roomed by Whit Bean   Accompanied by None         4/2/2025     9:58 AM   Patient Reported Additional Medications   Patient reports taking the following new medications None     Anxiety    History of Present Illness       Mental Health Follow-up:  Patient presents to follow-up on Depression & Anxiety.Patient's depression since last visit has been:  Better  The patient is having other symptoms associated with depression.  Patient's anxiety since last visit has been:  Better  The patient is having other symptoms associated with anxiety.  Any significant life events: relationship concerns and job concerns  Patient is not feeling anxious or having panic attacks.  Patient has no concerns about alcohol or  drug use.    Hypothyroidism:     Since last visit, patient describes the following symptoms::  Constipation, Depression, Dry skin and Loose stools    She eats 2-3 servings of fruits and vegetables daily.She consumes 0 sweetened beverage(s) daily.She exercises with enough effort to increase her heart rate 9 or less minutes per day.  She exercises with enough effort to increase her heart rate 3 or less days per week.   She is taking medications regularly.        Depression and Anxiety   How are you doing with your depression since your last visit? Improved slightly   How are you doing with your anxiety since your last visit?  No change  Are you having other symptoms that might be associated with depression or anxiety? Yes:  no energy, struggling to fall asleep, once asleep struggles to wake up, has no appetite or she can't stop eating the foods she should not be eating   Have you had a significant life event? Job Concerns - some changes coming to her job in May    Do you have any concerns with your use of alcohol or other drugs? No    Social History     Tobacco Use    Smoking status: Never    Smokeless tobacco: Never   Vaping Use    Vaping status: Never Used   Substance Use Topics    Alcohol use: Yes     Comment: Occasional    Drug use: Never         1/19/2025    12:13 PM 1/20/2025     9:07 AM 2/17/2025     9:05 AM   PHQ   PHQ-9 Total Score 20  22  11    Q9: Thoughts of better off dead/self-harm past 2 weeks More than half the days More than half the days Not at all   F/U: Thoughts of suicide or self-harm Yes Yes    F/U: Self harm-plan No No    F/U: Self-harm action No No    F/U: Safety concerns No No        Patient-reported         9/11/2024    10:08 AM 1/19/2025    12:13 PM 3/30/2025     6:50 PM   ROBBIE-7 SCORE   Total Score 5 (mild anxiety) 7 (mild anxiety) 5 (mild anxiety)   Total Score 5 7  5        Patient-reported         2/17/2025     9:05 AM   Last PHQ-9   1.  Little interest or pleasure in doing things 2   2.   Feeling down, depressed, or hopeless 2   3.  Trouble falling or staying asleep, or sleeping too much 2   4.  Feeling tired or having little energy 2   5.  Poor appetite or overeating 1   6.  Feeling bad about yourself 1   7.  Trouble concentrating 1   8.  Moving slowly or restless 0   Q9: Thoughts of better off dead/self-harm past 2 weeks 0   PHQ-9 Total Score 11        Patient-reported         3/30/2025     6:50 PM   ROBBIE-7    1. Feeling nervous, anxious, or on edge 1   2. Not being able to stop or control worrying 1   3. Worrying too much about different things 1   4. Trouble relaxing 1   5. Being so restless that it is hard to sit still 0   6. Becoming easily annoyed or irritable 1   7. Feeling afraid, as if something awful might happen 0   ROBBIE-7 Total Score 5    If you checked any problems, how difficult have they made it for you to do your work, take care of things at home, or get along with other people? Not difficult at all       Patient-reported       Suicide Assessment Five-step Evaluation and Treatment (SAFE-T)  Hypothyroidism Follow-up    Since last visit, patient describes the following symptoms: dry skin, constipation, loose stools, anxiety, depression, and fatigue    Clinic Visit  Date of visit: 2025   Chief Complaint:   Chief Complaint   Patient presents with    PCOS     Thyroid Problem    Depression    Anxiety       HPI:   Meredith Gordon is a 35 year old  female who presents to clinic today for follow up  for PCOS .     Menarche: age 14  Menses:  irregular frequency has improved some, more consistent in length. Patient's last menstrual period was 2025.   Family Planning Chart: Yes: .  Acne: Yes: .  Hirsutism (facial hair): No.  Male-pattern hair loss: No.  Elevated serum androgen:   Lab Results   Component Value Date    DHEA 283 2023    TESTOSTTOTAL 25 2023    FT 0.31 2023    JOVITA 1.900 2023   ]  BMI: Body mass index is 32.12 kg/m .   Type 2 DM:  No.    Gynecologic History:  Last Pap:   Lab Results   Component Value Date    PAP NIL 2016      History of abnormal pap: No.    Obstetric History:   OB History    Para Term  AB Living   0 0 0 0 0 0   SAB IAB Ectopic Multiple Live Births   0 0 0 0 0     Obstetric history significant for:  n/a    Medications:  Current Outpatient Medications   Medication Sig Dispense Refill    acetaZOLAMIDE (DIAMOX SEQUEL) 500 MG 12 hr capsule Take 1 Capsule by mouth two times a day. 60 capsule 2    Acetylcysteine, Nutrient, 600 MG TABS Take 600 mg by mouth 2 times daily      cabergoline (DOSTINEX) 0.5 MG tablet Take 1 tablet (0.5 mg) by mouth once a week. 12 tablet 1    FLUoxetine (PROZAC) 10 MG capsule Take 3 Capsules by mouth one time a day. 270 capsule 1    Inositol POWD 2,000 mg 2 times daily      JARDIANCE 25 MG TABS tablet Take 1 tablet (25 mg) by mouth daily. 90 tablet 1    levothyroxine (SYNTHROID/LEVOTHROID) 125 MCG tablet Take 1 tablet (125 mcg) by mouth daily 90 tablet 2    liothyronine (CYTOMEL) 5 MCG tablet Take 1 tablet (5 mcg) by mouth 2 times daily. 180 tablet 1    traZODone (DESYREL) 50 MG tablet Take 1 Tablet by mouth at bedtime. 90 tablet 1    triamcinolone (KENALOG) 0.1 % external cream Apply topically 2 (two) times a day. 45 g 1    venlafaxine (EFFEXOR XR) 75 MG 24 hr capsule Take 1 capsule (75 mg) by mouth daily. 30 capsule 1    vitamin D3 (CHOLECALCIFEROL) 2000 units (50 mcg) tablet Take 2 tablets (4,000 Units) by mouth daily 90 tablet 1    Naltrexone POWD 1.5 mg at bedtime for 7 days, THEN 3 mg at bedtime for 7 days, THEN 4.5 mg at bedtime for 7 days. 100 g 1     No current facility-administered medications for this visit.       Allergy:  Allergies   Allergen Reactions    Dogs Angioedema and Itching    Cats Itching     Cat/feline product derivatives       Medical History:  Past Medical History:   Diagnosis Date     novel coronavirus disease (COVID-19) 2021    B12 deficiency  "06/25/2012    resolved    Hashimoto's disease 09/02/2011    Insulin resistance 2023    Mild scoliosis 2022    Nephrolithiasis     2011 in [facility] ([facility])    PCOS (polycystic ovarian syndrome)     Recurrent major depressive disorder, in partial remission        Surgical History:  Past Surgical History:   Procedure Laterality Date    EXAM UNDER ANESTHESIA RECTUM N/A 8/9/2022    Procedure: EXAM UNDER ANESTHESIA, RECTUM;  Surgeon: Denilson Singh MD;  [facility]: [facility]    HEMORRHOIDECTOMY EXTERNAL N/A 8/9/2022    Procedure: Hemorrhoidectomy;  Surgeon: Denilson Singh MD;  Location: [facility]    MAMMOPLASTY REDUCTION  2015    wisdom teeth         Social History:  Social History    Substance and Sexual Activity      Alcohol use: Yes        Comment: Occasional    History   Smoking Status    Never   Smokeless Tobacco    Never     History   Drug Use Unknown     History   Sexual Activity    Sexual activity: Never     Relationship status is: Never been .        Review of Systems  Constitutional, HEENT, cardiovascular, pulmonary, GI, , musculoskeletal, neuro, skin, endocrine and psych systems are negative, except as otherwise noted.      Objective    /78 (BP Location: Left arm, Patient Position: Sitting, Cuff Size: Adult Regular)   Pulse 84   Temp 97.8  F (36.6  C) (Tympanic)   Resp 16   Ht 1.651 m (5' 5\")   Wt 87.5 kg (193 lb)   LMP 03/12/2025   SpO2 100%   BMI 32.12 kg/m    Body mass index is 32.12 kg/m .  Physical Exam   GENERAL: alert and no distress  RESP: lungs clear to auscultation - no rales, rhonchi or wheezes  CV: regular rate and rhythm, normal S1 S2, no S3 or S4, no murmur, click or rub, no peripheral edema  ABDOMEN: soft, nontender, no hepatosplenomegaly, no masses and bowel sounds normal  MS: no gross musculoskeletal defects noted, no edema  PSYCH: mentation appears normal, affect normal/bright    Results for orders placed or performed in visit on 04/02/25   Iron and iron binding " capacity     Status: Normal   Result Value Ref Range    Iron 79 37 - 145 ug/dL    Iron Binding Capacity 308 240 - 430 ug/dL    Iron Sat Index 26 15 - 46 %   Ferritin     Status: Normal   Result Value Ref Range    Ferritin 29 6 - 175 ng/mL   Extra Tube     Status: None    Narrative    The following orders were created for panel order Extra Tube.  Procedure                               Abnormality         Status                     ---------                               -----------         ------                     Extra Green Top (Lithiu...[[id number]]                      Final result                 Please view results for these tests on the individual orders.   Extra Green Top (Lithium Heparin) Tube     Status: None   Result Value Ref Range    Hold Specimen JIC            Signed Electronically by: Jovana Lambert MD  The longitudinal plan of care for the diagnosis(es)/condition(s) as documented were addressed during this visit. Due to the added complexity in care, I will continue to support Meredith in the subsequent management and with ongoing continuity of care.    Answers submitted by the patient for this visit:  Patient Health Questionnaire (G7) (Submitted on 3/30/2025)  ROBBIE 7 TOTAL SCORE: 5  Depression / Anxiety Questionnaire (Submitted on 3/30/2025)  Chief Complaint: Chronic problems general questions HPI Form  Depression/Anxiety: Depression & Anxiety  Hypothyroid  (Submitted on 3/30/2025)  Chief Complaint: Chronic problems general questions HPI Form  Since last visit, patient describes the following symptoms:: Constipation, Depression, Dry skin, Loose stools  Depression & Anxiety (Submitted on 3/30/2025)  Chief Complaint: Chronic problems general questions HPI Form  Status since last visit:: better  Anxiety since last: : better  Other associated symptoms of depression:: Yes  Other associated symotome: : Yes  Significant life event: : relationship concerns, job concerns  Anxious:: No  Current substance  use:: No  General Questionnaire (Submitted on 3/30/2025)  Chief Complaint: Chronic problems general questions HPI Form  How many servings of fruits and vegetables do you eat daily?: 2-3  On average, how many sweetened beverages do you drink each day (Examples: soda, juice, sweet tea, etc.  Do NOT count diet or artificially sweetened beverages)?: 0  How many minutes a day do you exercise enough to make your heart beat faster?: 9 or less  How many days a week do you exercise enough to make your heart beat faster?: 3 or less  How many days per week do you miss taking your medication?: 0  Questionnaire about: Chronic problems general questions HPI Form (Submitted on 3/30/2025)  Chief Complaint: Chronic problems general questions HPI Form

## 2025-04-13 DIAGNOSIS — F51.02 ADJUSTMENT INSOMNIA: ICD-10-CM

## 2025-04-14 RX ORDER — TRAZODONE HYDROCHLORIDE 50 MG/1
50 TABLET ORAL AT BEDTIME
Qty: 90 TABLET | Refills: 3 | Status: SHIPPED | OUTPATIENT
Start: 2025-04-14

## 2025-04-14 NOTE — TELEPHONE ENCOUNTER
traZODone (DESYREL) 50 MG tablet 90 tablet 1 9/30/2024     Last Office Visit: 4/2/25  Future Office visit:    Next 5 appointments (look out 90 days)      Jun 11, 2025 10:00 AM  (Arrive by 9:45 AM)  Provider Visit with Jovana Lambert MD  [facility] [place] ([facility] [place] ) [address]  [place] [address]  [phone]             Routing refill request to provider for review/approval because:

## 2025-04-18 ENCOUNTER — MYC MEDICAL ADVICE (OUTPATIENT)
Dept: FAMILY MEDICINE | Facility: OTHER | Age: 35
End: 2025-04-18

## 2025-04-18 DIAGNOSIS — E06.3 HYPOTHYROIDISM DUE TO HASHIMOTO'S THYROIDITIS: ICD-10-CM

## 2025-04-18 DIAGNOSIS — E28.2 PCOS (POLYCYSTIC OVARIAN SYNDROME): ICD-10-CM

## 2025-04-20 DIAGNOSIS — F33.2 SEVERE EPISODE OF RECURRENT MAJOR DEPRESSIVE DISORDER, WITHOUT PSYCHOTIC FEATURES (H): ICD-10-CM

## 2025-04-20 DIAGNOSIS — H47.10 PAPILLEDEMA OF BOTH EYES: ICD-10-CM

## 2025-04-21 RX ORDER — NALTREXONE 100 %
POWDER (GRAM) MISCELLANEOUS
Qty: 100 G | Refills: 1 | Status: SHIPPED | OUTPATIENT
Start: 2025-04-21 | End: 2025-04-22

## 2025-04-21 RX ORDER — VENLAFAXINE HYDROCHLORIDE 75 MG/1
75 CAPSULE, EXTENDED RELEASE ORAL DAILY
Qty: 30 CAPSULE | Refills: 1 | Status: SHIPPED | OUTPATIENT
Start: 2025-04-21

## 2025-04-21 RX ORDER — ACETAZOLAMIDE 500 MG/1
500 CAPSULE, EXTENDED RELEASE ORAL 2 TIMES DAILY
Qty: 60 CAPSULE | Refills: 2 | Status: SHIPPED | OUTPATIENT
Start: 2025-04-21

## 2025-04-21 NOTE — TELEPHONE ENCOUNTER
Naltrexone POWD       Last Written Prescription Date:  11/15/2024  Last Fill Quantity: 100 g,   # refills: 1  Last Office Visit: 04/02/2025  Future Office visit:    Next 5 appointments (look out 90 days)      Jun 11, 2025 10:00 AM  (Arrive by 9:45 AM)  Provider Visit with Jovana Lambert MD  [facility] [place] ([facility] [place] ) [address]  [place] [address]  [phone]     Jul 14, 2025 8:30 AM  (Arrive by 8:15 AM)  Adult Preventative Visit with Jovana Lambert MD  [facility] [place] ([facility] [place] ) [address]  [place] [address]  [phone]             Routing refill request to provider for review/approval because:  Drug not on the [facility], P or [facility] refill protocol or controlled substance

## 2025-04-21 NOTE — TELEPHONE ENCOUNTER
acetaZOLAMIDE (DIAMOX SEQUEL) 500 MG 12 hr      Last Written Prescription Date:  01/20/2025  Last Fill Quantity: 60,   # refills: 2  Last Office Visit: 04/02/2025  Future Office visit:    Next 5 appointments (look out 90 days)      Jun 11, 2025 10:00 AM  (Arrive by 9:45 AM)  Provider Visit with Jovana Lambert MD  [facility] [place] ([facility] [place] ) [address] [place] [address]  [phone]     Jul 14, 2025 8:30 AM  (Arrive by 8:15 AM)  Adult Preventative Visit with Jovana Lambert MD  [facility] [place] ([facility] [place] ) [address]  [place] [address]  [phone]             Routing refill request to provider for review/approval because:  Drug not on the [facility], P or [facility] refill protocol or controlled substance        venlafaxine (EFFEXOR XR) 75 MG 24 hr       Last Written Prescription Date:  02/17/2025  Last Fill Quantity: 30,   # refills: 1    Routing refill request to provider for review/approval because:  Serotonin-Norepinephrine Reuptake Inhibitors  Failed    Rerun Protocol (4/20/2025 7:51 PM)    PHQ-9 score of less than 5 in past 6 months    Please review last PHQ-9 score.        1/19/2025    12:13 PM 1/20/2025     9:07 AM 2/17/2025     9:05 AM   PHQ-9 SCORE   PHQ-9 Total Score MyChart 20 (Severe depression) 22 (Severe depression) 11 (Moderate depression)   PHQ-9 Total Score 20  22  11        Patient-reported

## 2025-04-22 RX ORDER — NALTREXONE 100 %
4.5 POWDER (GRAM) MISCELLANEOUS AT BEDTIME
Qty: 100 G | Refills: 0 | Status: SHIPPED | OUTPATIENT
Start: 2025-04-22

## 2025-04-22 NOTE — TELEPHONE ENCOUNTER
Pt called and is currently taking 4.5 mg daily at bedtime.     The old order had the tapering dose of 1.5 mg for 7 days then 3 mg for 7 days then 4.5 mg for 7 days.  Did you want to continue on the 4.5 mg or change the dose.     Pended partial Naltrexone order.  Please review and sign new order if appropriate.

## 2025-06-08 ASSESSMENT — ANXIETY QUESTIONNAIRES
2. NOT BEING ABLE TO STOP OR CONTROL WORRYING: NOT AT ALL
4. TROUBLE RELAXING: SEVERAL DAYS
6. BECOMING EASILY ANNOYED OR IRRITABLE: SEVERAL DAYS
IF YOU CHECKED OFF ANY PROBLEMS ON THIS QUESTIONNAIRE, HOW DIFFICULT HAVE THESE PROBLEMS MADE IT FOR YOU TO DO YOUR WORK, TAKE CARE OF THINGS AT HOME, OR GET ALONG WITH OTHER PEOPLE: NOT DIFFICULT AT ALL
1. FEELING NERVOUS, ANXIOUS, OR ON EDGE: SEVERAL DAYS
7. FEELING AFRAID AS IF SOMETHING AWFUL MIGHT HAPPEN: NOT AT ALL
3. WORRYING TOO MUCH ABOUT DIFFERENT THINGS: NOT AT ALL
GAD7 TOTAL SCORE: 4
5. BEING SO RESTLESS THAT IT IS HARD TO SIT STILL: SEVERAL DAYS

## 2025-06-10 ASSESSMENT — PATIENT HEALTH QUESTIONNAIRE - PHQ9: SUM OF ALL RESPONSES TO PHQ QUESTIONS 1-9: 6

## 2025-06-11 ASSESSMENT — PATIENT HEALTH QUESTIONNAIRE - PHQ9
10. IF YOU CHECKED OFF ANY PROBLEMS, HOW DIFFICULT HAVE THESE PROBLEMS MADE IT FOR YOU TO DO YOUR WORK, TAKE CARE OF THINGS AT HOME, OR GET ALONG WITH OTHER PEOPLE: SOMEWHAT DIFFICULT
SUM OF ALL RESPONSES TO PHQ QUESTIONS 1-9: 6

## 2025-06-15 DIAGNOSIS — F33.0 MILD EPISODE OF RECURRENT MAJOR DEPRESSIVE DISORDER: ICD-10-CM

## 2025-06-15 DIAGNOSIS — F33.2 SEVERE EPISODE OF RECURRENT MAJOR DEPRESSIVE DISORDER, WITHOUT PSYCHOTIC FEATURES (H): ICD-10-CM

## 2025-06-15 ASSESSMENT — ANXIETY QUESTIONNAIRES
2. NOT BEING ABLE TO STOP OR CONTROL WORRYING: NOT AT ALL
7. FEELING AFRAID AS IF SOMETHING AWFUL MIGHT HAPPEN: NOT AT ALL
GAD7 TOTAL SCORE: 4
8. IF YOU CHECKED OFF ANY PROBLEMS, HOW DIFFICULT HAVE THESE MADE IT FOR YOU TO DO YOUR WORK, TAKE CARE OF THINGS AT HOME, OR GET ALONG WITH OTHER PEOPLE?: NOT DIFFICULT AT ALL
1. FEELING NERVOUS, ANXIOUS, OR ON EDGE: SEVERAL DAYS
3. WORRYING TOO MUCH ABOUT DIFFERENT THINGS: NOT AT ALL
7. FEELING AFRAID AS IF SOMETHING AWFUL MIGHT HAPPEN: NOT AT ALL
6. BECOMING EASILY ANNOYED OR IRRITABLE: SEVERAL DAYS
GAD7 TOTAL SCORE: 4
GAD7 TOTAL SCORE: 4
4. TROUBLE RELAXING: SEVERAL DAYS
IF YOU CHECKED OFF ANY PROBLEMS ON THIS QUESTIONNAIRE, HOW DIFFICULT HAVE THESE PROBLEMS MADE IT FOR YOU TO DO YOUR WORK, TAKE CARE OF THINGS AT HOME, OR GET ALONG WITH OTHER PEOPLE: NOT DIFFICULT AT ALL
5. BEING SO RESTLESS THAT IT IS HARD TO SIT STILL: SEVERAL DAYS

## 2025-06-16 ENCOUNTER — APPOINTMENT (OUTPATIENT)
Dept: LAB | Facility: OTHER | Age: 35
End: 2025-06-16
Attending: FAMILY MEDICINE
Payer: COMMERCIAL

## 2025-06-16 ENCOUNTER — OFFICE VISIT (OUTPATIENT)
Dept: FAMILY MEDICINE | Facility: OTHER | Age: 35
End: 2025-06-16
Attending: FAMILY MEDICINE
Payer: COMMERCIAL

## 2025-06-16 VITALS
WEIGHT: 202 LBS | OXYGEN SATURATION: 98 % | BODY MASS INDEX: 33.66 KG/M2 | TEMPERATURE: 97.5 F | HEART RATE: 102 BPM | DIASTOLIC BLOOD PRESSURE: 62 MMHG | SYSTOLIC BLOOD PRESSURE: 110 MMHG | HEIGHT: 65 IN

## 2025-06-16 DIAGNOSIS — E28.2 PCOS (POLYCYSTIC OVARIAN SYNDROME): Primary | ICD-10-CM

## 2025-06-16 DIAGNOSIS — F33.0 MILD EPISODE OF RECURRENT MAJOR DEPRESSIVE DISORDER: ICD-10-CM

## 2025-06-16 DIAGNOSIS — K91.1 DUMPING SYNDROME: ICD-10-CM

## 2025-06-16 DIAGNOSIS — E88.819 INSULIN RESISTANCE: ICD-10-CM

## 2025-06-16 DIAGNOSIS — R79.89 ELEVATED PROLACTIN LEVEL: ICD-10-CM

## 2025-06-16 DIAGNOSIS — E06.3 HYPOTHYROIDISM DUE TO HASHIMOTO'S THYROIDITIS: ICD-10-CM

## 2025-06-16 DIAGNOSIS — K90.9 DIARRHEA DUE TO MALABSORPTION: ICD-10-CM

## 2025-06-16 DIAGNOSIS — R19.7 DIARRHEA DUE TO MALABSORPTION: ICD-10-CM

## 2025-06-16 DIAGNOSIS — F41.1 GAD (GENERALIZED ANXIETY DISORDER): ICD-10-CM

## 2025-06-16 RX ORDER — FLUOXETINE 10 MG/1
CAPSULE ORAL
Qty: 270 CAPSULE | Refills: 1 | OUTPATIENT
Start: 2025-06-16

## 2025-06-16 RX ORDER — VENLAFAXINE HYDROCHLORIDE 75 MG/1
75 CAPSULE, EXTENDED RELEASE ORAL DAILY
Qty: 90 CAPSULE | Refills: 2 | Status: SHIPPED | OUTPATIENT
Start: 2025-06-16

## 2025-06-16 ASSESSMENT — ENCOUNTER SYMPTOMS: NERVOUS/ANXIOUS: 1

## 2025-06-16 ASSESSMENT — PAIN SCALES - GENERAL: PAINLEVEL_OUTOF10: NO PAIN (0)

## 2025-06-16 NOTE — PROGRESS NOTES
"The longitudinal plan of care for the diagnosis(es)/condition(s) as documented were addressed during this visit. Due to the added complexity in care, I will continue to support Meredith in the subsequent management and with ongoing continuity of care.    Assessment & Plan     PCOS (polycystic ovarian syndrome)  Cycles are still every 24 days and somewhat heavy, dysmenorrhea    Insulin resistance  Continue current meds    Hypothyroidism due to Hashimoto's thyroiditis  Labs pending  - TSH with free T4 reflex; Future  - T3, total; Future  - T3, Free; Future    Dumping syndrome  Within 30 min of eating and less so if she eats more carbs with her meals  I do think we need to get stool studies but she also needs colonoscopy  - Adult GI  Referral - Consult Only; Future  - Adult GI  Referral - Consult Only; Future    Diarrhea due to malabsorption  As above  - Adult GI  Referral - Consult Only; Future  - Adult GI  Referral - Consult Only; Future  - Enteric Bacteria and Virus Panel by [facility] Stool; Future  - Fecal colorectal cancer screen (FIT); Future  - Fecal Lactoferrin; Future  - Ova and Parasite Screen; Future  - Enteric Bacteria and Virus Panel by [facility] Stool  - Fecal colorectal cancer screen (FIT)  - Fecal Lactoferrin  - Ova and Parasite Screen  - Calprotectin Feces; Future  - Calprotectin Feces    Elevated prolactin level  Repeat labs  - Prolactin; Standing    ROBBIE (generalized anxiety disorder)  Cut down to 20mg prozac and follow-up 2-3 mos  - FLUoxetine (PROZAC) 20 MG capsule; Take 1 capsule (20 mg) by mouth daily.    Mild episode of recurrent major depressive disorder  As above  - FLUoxetine (PROZAC) 20 MG capsule; Take 1 capsule (20 mg) by mouth daily.      BMI  Estimated body mass index is 33.61 kg/m  as calculated from the following:    Height as of this encounter: 1.651 m (5' 5\").    Weight as of this encounter: 91.6 kg (202 lb).   Weight management plan: Discussed healthy diet and " exercise guidelines    Patient was agreeable to this plan and had no further questions.  Follow-up   33 minutes spent by me on the date of the encounter doing chart review, history and exam, documentation and further activities per the note      Subjective   Meredith is a 35 year old, presenting for the following health issues:  Depression, Anxiety, pcos, and Thyroid Problem        6/16/2025     1:50 PM   Additional Questions   Roomed by Lilly Robins    History of Present Illness       Mental Health Follow-up:  Patient presents to follow-up on Depression & Anxiety.Patient's depression since last visit has been:  Better  The patient is not having other symptoms associated with depression.  Patient's anxiety since last visit has been:  Better  The patient is not having other symptoms associated with anxiety.  Any significant life events: No  Patient is not feeling anxious or having panic attacks.  Patient has no concerns about alcohol or drug use.    Hypothyroidism:     Since last visit, patient describes the following symptoms::  Anxiety, Depression, Fatigue, Hair loss, Loose stools and Weight gain    Weight gain::  5 lbs.    She eats 2-3 servings of fruits and vegetables daily.She consumes 0 sweetened beverage(s) daily.She exercises with enough effort to increase her heart rate 9 or less minutes per day.  She exercises with enough effort to increase her heart rate 3 or less days per week.   She is taking medications regularly.        Depression and Anxiety   How are you doing with your depression since your last visit? Improved   How are you doing with your anxiety since your last visit?  Improved   Are you having other symptoms that might be associated with depression or anxiety? No  Have you had a significant life event? No   Do you have any concerns with your use of alcohol or other drugs? No    Social History     Tobacco Use    Smoking status: Never    Smokeless tobacco: Never   Vaping Use    Vaping status:  Never Used   Substance Use Topics    Alcohol use: Yes     Comment: Occasional    Drug use: Never         1/20/2025     9:07 AM 2/17/2025     9:05 AM 6/10/2025    11:00 AM   PHQ   PHQ-9 Total Score 22  11  6    Q9: Thoughts of better off dead/self-harm past 2 weeks More than half the days Not at all Not at all   F/U: Thoughts of suicide or self-harm Yes     F/U: Self harm-plan No     F/U: Self-harm action No     F/U: Safety concerns No         Patient-reported         3/30/2025     6:50 PM 6/8/2025    10:59 AM 6/15/2025     3:14 PM   ROBBIE-7 SCORE   Total Score 5 (mild anxiety) 4 (minimal anxiety) 4 (minimal anxiety)   Total Score 5  4  4        Patient-reported         6/10/2025    11:00 AM   Last PHQ-9   1.  Little interest or pleasure in doing things 1   2.  Feeling down, depressed, or hopeless 1   3.  Trouble falling or staying asleep, or sleeping too much 1   4.  Feeling tired or having little energy 2   5.  Poor appetite or overeating 0   6.  Feeling bad about yourself 0   7.  Trouble concentrating 1   8.  Moving slowly or restless 0   Q9: Thoughts of better off dead/self-harm past 2 weeks 0   PHQ-9 Total Score 6        Patient-reported         6/15/2025     3:14 PM   ROBBIE-7    1. Feeling nervous, anxious, or on edge 1   2. Not being able to stop or control worrying 0   3. Worrying too much about different things 0   4. Trouble relaxing 1   5. Being so restless that it is hard to sit still 1   6. Becoming easily annoyed or irritable 1   7. Feeling afraid, as if something awful might happen 0   ROBBIE-7 Total Score 4    If you checked any problems, how difficult have they made it for you to do your work, take care of things at home, or get along with other people? Not difficult at all       Patient-reported       Suicide Assessment Five-step Evaluation and Treatment (SAFE-T)  Hypothyroidism Follow-up    Since last visit, patient describes the following symptoms: Weight stable, no hair loss, no skin changes, no  "constipation, no loose stools, weight gain of 5 lbs, loose stools, anxiety, depression, fatigue, and hair loss    Review of Systems  Constitutional, neuro, ENT, endocrine, pulmonary, cardiac, gastrointestinal, genitourinary, musculoskeletal, integument and psychiatric systems are negative, except as otherwise noted.      Objective    /62   Pulse 102   Temp 97.5  F (36.4  C) (Tympanic)   Ht 1.651 m (5' 5\")   Wt 91.6 kg (202 lb)   SpO2 98%   BMI 33.61 kg/m    Body mass index is 33.61 kg/m .  Physical Exam   GENERAL: alert and no distress  RESP: lungs clear to auscultation - no rales, rhonchi or wheezes  CV: regular rate and rhythm, normal S1 S2, no S3 or S4, no murmur, click or rub, no peripheral edema  ABDOMEN: soft, nontender, no hepatosplenomegaly, no masses and bowel sounds normal  MS: no gross musculoskeletal defects noted, no edema  PSYCH: mentation appears normal, affect normal/bright    Results for orders placed or performed in visit on 04/02/25   Iron and iron binding capacity     Status: Normal   Result Value Ref Range    Iron 79 37 - 145 ug/dL    Iron Binding Capacity 308 240 - 430 ug/dL    Iron Sat Index 26 15 - 46 %   Ferritin     Status: Normal   Result Value Ref Range    Ferritin 29 6 - 175 ng/mL   Extra Tube     Status: None    Narrative    The following orders were created for panel order Extra Tube.  Procedure                               Abnormality         Status                     ---------                               -----------         ------                     Extra Green Top (Lithiu...[[id number]]                      Final result                 Please view results for these tests on the individual orders.   Extra Green Top (Lithium Heparin) Tube     Status: None   Result Value Ref Range    Hold Specimen JIC            Signed Electronically by: Jovana Lambert MD    "

## 2025-06-16 NOTE — TELEPHONE ENCOUNTER
FLUoxetine (PROZAC) 10 MG capsule       Last Written Prescription Date:  12/9/2024  Last Fill Quantity: 270,   # refills: 1  Last Office Visit: 4/2/2025  Future Office visit:    Next 5 appointments (look out 90 days)      Jul 14, 2025 8:30 AM  (Arrive by 8:15 AM)  Adult Preventative Visit with Jovana Lambert MD  [facility] [place] ([facility] [place] ) [address]  [place] [address]  [phone]           venlafaxine (EFFEXOR XR) 75 MG 24 hr capsule       Last Written Prescription Date:  4/21/2025  Last Fill Quantity: 30,   # refills: 1

## 2025-06-17 ENCOUNTER — RESULTS FOLLOW-UP (OUTPATIENT)
Dept: FAMILY MEDICINE | Facility: OTHER | Age: 35
End: 2025-06-17

## 2025-06-17 LAB
ADV 40+41 DNA STL QL NAA+NON-PROBE: NEGATIVE
ASTRO TYP 1-8 RNA STL QL NAA+NON-PROBE: NEGATIVE
C CAYETANENSIS DNA STL QL NAA+NON-PROBE: NEGATIVE
CAMPYLOBACTER DNA SPEC NAA+PROBE: NEGATIVE
CRYPTOSP DNA STL QL NAA+NON-PROBE: NEGATIVE
E COLI O157 DNA STL QL NAA+NON-PROBE: ABNORMAL
E HISTOLYT DNA STL QL NAA+NON-PROBE: NEGATIVE
EAEC ASTA GENE ISLT QL NAA+PROBE: NEGATIVE
EC STX1+STX2 GENES STL QL NAA+NON-PROBE: NEGATIVE
EPEC EAE GENE STL QL NAA+NON-PROBE: NEGATIVE
ETEC LTA+ST1A+ST1B TOX ST NAA+NON-PROBE: NEGATIVE
G LAMBLIA DNA STL QL NAA+NON-PROBE: NEGATIVE
LACTOFERRIN STL QL IA: NEGATIVE
NOROVIRUS GI+II RNA STL QL NAA+NON-PROBE: POSITIVE
P SHIGELLOIDES DNA STL QL NAA+NON-PROBE: NEGATIVE
RVA RNA STL QL NAA+NON-PROBE: NEGATIVE
SALMONELLA SP RPOD STL QL NAA+PROBE: NEGATIVE
SAPO I+II+IV+V RNA STL QL NAA+NON-PROBE: NEGATIVE
SHIGELLA SP+EIEC IPAH ST NAA+NON-PROBE: NEGATIVE
V CHOLERAE DNA SPEC QL NAA+PROBE: NEGATIVE
VIBRIO DNA SPEC NAA+PROBE: NEGATIVE
Y ENTEROCOL DNA STL QL NAA+PROBE: NEGATIVE

## 2025-06-18 ENCOUNTER — LAB (OUTPATIENT)
Dept: LAB | Facility: OTHER | Age: 35
End: 2025-06-18
Payer: COMMERCIAL

## 2025-06-18 DIAGNOSIS — R79.89 ELEVATED PROLACTIN LEVEL: ICD-10-CM

## 2025-06-18 DIAGNOSIS — E06.3 HYPOTHYROIDISM DUE TO HASHIMOTO'S THYROIDITIS: ICD-10-CM

## 2025-06-18 LAB
CALPROTECTIN STL-MCNT: 11.7 MG/KG (ref 0–49.9)
O+P STL MICRO: NEGATIVE
PROLACTIN SERPL 3RD IS-MCNC: 8 NG/ML (ref 5–23)
SPECIMEN TYPE: NORMAL
T3 SERPL-MCNC: 122 NG/DL (ref 85–202)
T3FREE SERPL-MCNC: 3.1 PG/ML (ref 2–4.4)
T4 FREE SERPL-MCNC: 1.07 NG/DL (ref 0.9–1.7)
TSH SERPL DL<=0.005 MIU/L-ACNC: 0.02 UIU/ML (ref 0.3–4.2)

## 2025-06-18 PROCEDURE — 84443 ASSAY THYROID STIM HORMONE: CPT

## 2025-06-18 PROCEDURE — 84480 ASSAY TRIIODOTHYRONINE (T3): CPT

## 2025-06-18 PROCEDURE — 84439 ASSAY OF FREE THYROXINE: CPT

## 2025-06-18 PROCEDURE — 84146 ASSAY OF PROLACTIN: CPT

## 2025-06-18 PROCEDURE — 36415 COLL VENOUS BLD VENIPUNCTURE: CPT

## 2025-06-29 DIAGNOSIS — E06.3 HYPOTHYROIDISM DUE TO HASHIMOTO'S THYROIDITIS: ICD-10-CM

## 2025-06-30 NOTE — TELEPHONE ENCOUNTER
Levothyroxine  Last Written Prescription Date: 6/28/24  Last Fill Quantity: 90 # of Refills: 2  Last Office Visit: 6/16/25

## 2025-07-01 RX ORDER — LEVOTHYROXINE SODIUM 125 UG/1
125 TABLET ORAL DAILY
Qty: 90 TABLET | Refills: 2 | Status: SHIPPED | OUTPATIENT
Start: 2025-07-01

## 2025-07-07 ENCOUNTER — MEDICAL CORRESPONDENCE (OUTPATIENT)
Dept: HEALTH INFORMATION MANAGEMENT | Facility: CLINIC | Age: 35
End: 2025-07-07

## 2025-07-08 ENCOUNTER — PREP FOR PROCEDURE (OUTPATIENT)
Dept: SURGERY | Facility: OTHER | Age: 35
End: 2025-07-08

## 2025-07-08 ENCOUNTER — OFFICE VISIT (OUTPATIENT)
Dept: SURGERY | Facility: OTHER | Age: 35
End: 2025-07-08
Attending: SURGERY
Payer: COMMERCIAL

## 2025-07-08 VITALS
HEART RATE: 84 BPM | DIASTOLIC BLOOD PRESSURE: 84 MMHG | SYSTOLIC BLOOD PRESSURE: 122 MMHG | TEMPERATURE: 98.1 F | RESPIRATION RATE: 16 BRPM | OXYGEN SATURATION: 99 %

## 2025-07-08 DIAGNOSIS — R19.7 DIARRHEA DUE TO MALABSORPTION: ICD-10-CM

## 2025-07-08 DIAGNOSIS — R19.7 DIARRHEA: Primary | ICD-10-CM

## 2025-07-08 DIAGNOSIS — K91.1 DUMPING SYNDROME: ICD-10-CM

## 2025-07-08 DIAGNOSIS — K90.9 DIARRHEA DUE TO MALABSORPTION: ICD-10-CM

## 2025-07-08 LAB
BASOPHILS # BLD AUTO: 0 10E3/UL (ref 0–0.2)
BASOPHILS NFR BLD AUTO: 0 %
CRP SERPL-MCNC: 6.02 MG/L
EOSINOPHIL # BLD AUTO: 0.1 10E3/UL (ref 0–0.7)
EOSINOPHIL NFR BLD AUTO: 1 %
ERYTHROCYTE [DISTWIDTH] IN BLOOD BY AUTOMATED COUNT: 12.9 % (ref 10–15)
HCT VFR BLD AUTO: 44 % (ref 35–47)
HGB BLD-MCNC: 14 G/DL (ref 11.7–15.7)
IMM GRANULOCYTES # BLD: 0 10E3/UL
IMM GRANULOCYTES NFR BLD: 0 %
LYMPHOCYTES # BLD AUTO: 1.9 10E3/UL (ref 0.8–5.3)
LYMPHOCYTES NFR BLD AUTO: 20 %
MCH RBC QN AUTO: 28.4 PG (ref 26.5–33)
MCHC RBC AUTO-ENTMCNC: 31.8 G/DL (ref 31.5–36.5)
MCV RBC AUTO: 89 FL (ref 78–100)
MONOCYTES # BLD AUTO: 0.9 10E3/UL (ref 0–1.3)
MONOCYTES NFR BLD AUTO: 9 %
NEUTROPHILS # BLD AUTO: 6.9 10E3/UL (ref 1.6–8.3)
NEUTROPHILS NFR BLD AUTO: 70 %
NRBC # BLD AUTO: 0 10E3/UL
NRBC BLD AUTO-RTO: 0 /100
PLATELET # BLD AUTO: 355 10E3/UL (ref 150–450)
RBC # BLD AUTO: 4.93 10E6/UL (ref 3.8–5.2)
WBC # BLD AUTO: 9.8 10E3/UL (ref 4–11)

## 2025-07-08 ASSESSMENT — PATIENT HEALTH QUESTIONNAIRE - PHQ9: SUM OF ALL RESPONSES TO PHQ QUESTIONS 1-9: 0

## 2025-07-08 ASSESSMENT — PAIN SCALES - GENERAL: PAINLEVEL_OUTOF10: NO PAIN (0)

## 2025-07-08 NOTE — PROGRESS NOTES
CLINIC NOTE - CONSULT  7/8/2025    Patient : Meredith Gordon  Referring Physician : Jovana Lambert    Reason for Referral : Upper and lower endoscopy    This is a 35 year old female with a need for an upper and lower endoscopy.  Upper endoscopy and lower endoscopy is needed for Change in Bowel Habits (Diarrhea).  The patient gives a 1 year history of recurrent diarrhea.  Generally happens 30 to 60 minutes after eating.  Stool is not acholic.  States that it does somewhat look like whipped cream and floats on the surface.  Very odiferous.  No noted blood.    She did have a partial stool workup with a calprotectin, parasite, fecal lactoferrin, and enteric bacteria.  These were all normal or negative with the exception of enteric bacteria did show norovirus.    Past Medical History:  Past Medical History:   Diagnosis Date    2019 novel coronavirus disease (COVID-19) 08/2021    B12 deficiency 06/25/2012    resolved    Hashimoto's disease 09/02/2011    Insulin resistance 2023    Mild scoliosis 2022    Nephrolithiasis     2011 in [facility] ([facility]    PCOS (polycystic ovarian syndrome)     Recurrent major depressive disorder, in partial remission        Past Surgical History:  Past Surgical History:   Procedure Laterality Date    EXAM UNDER ANESTHESIA RECTUM N/A 8/9/2022    Procedure: EXAM UNDER ANESTHESIA, RECTUM;  Surgeon: Denilson Singh MD;  Location: [facility]    HEMORRHOIDECTOMY EXTERNAL N/A 8/9/2022    Procedure: Hemorrhoidectomy;  Surgeon: Denilson Singh MD;  Location: [facility]    MAMMOPLASTY REDUCTION  2015    wisdom teeth         Family History History:  Family History   Problem Relation Age of Onset    Thyroid Disease Mother         hypothyroidism    Anxiety Disorder Mother     Insulin Resistance Mother     Nephrolithiasis Father     Cataracts Father     Anxiety Disorder Sister     Polycystic ovary syndrome Sister     Insulin Resistance Sister     Insulin Resistance Sister     Family History Negative  Brother     Depression Brother     Thyroid Disease Brother     Obesity Brother     Hypothyroidism Maternal Grandmother     Cerebrovascular Disease Maternal Grandmother 76    Rheumatoid Arthritis Maternal Grandfather     Cerebrovascular Disease Paternal Grandmother 85        old age    Diabetes Paternal Grandfather         type 2    Hypertension Paternal Grandfather        History of Tobacco Use:  History   Smoking Status    Never   Smokeless Tobacco    Never       Current Medications:  Current Outpatient Medications   Medication Sig Dispense Refill    acetaZOLAMIDE (DIAMOX SEQUEL) 500 MG 12 hr capsule Take 1 Capsule by mouth two times a day. 60 capsule 2    Acetylcysteine, Nutrient, 600 MG TABS Take 600 mg by mouth 2 times daily      cabergoline (DOSTINEX) 0.5 MG tablet Take 1 tablet (0.5 mg) by mouth once a week. 12 tablet 1    FLUoxetine (PROZAC) 20 MG capsule Take 1 capsule (20 mg) by mouth daily. 90 capsule 1    Inositol POWD 2,000 mg 2 times daily      JARDIANCE 25 MG TABS tablet Take 1 tablet (25 mg) by mouth daily. 90 tablet 1    levothyroxine (SYNTHROID/LEVOTHROID) 125 MCG tablet Take 1 Tablet by mouth one time a day. 90 tablet 2    liothyronine (CYTOMEL) 5 MCG tablet Take 1 tablet (5 mcg) by mouth 2 times daily. 180 tablet 1    Naltrexone POWD 4.5 mg at bedtime. 100 g 0    traZODone (DESYREL) 50 MG tablet Take 1 Tablet by mouth at bedtime. 90 tablet 3    triamcinolone (KENALOG) 0.1 % external cream Apply topically 2 (two) times a day. 45 g 1    venlafaxine (EFFEXOR XR) 75 MG 24 hr capsule Take 1 capsule (75 mg) by mouth daily. 90 capsule 2    vitamin D3 (CHOLECALCIFEROL) 2000 units (50 mcg) tablet Take 2 tablets (4,000 Units) by mouth daily 90 tablet 1       Allergies:  Allergies   Allergen Reactions    Dogs Angioedema and Itching    Cats Itching     Cat/feline product derivatives       ROS:  Pertinent items are noted in HPI.  All other systems are negative.    PHYSICAL EXAM:     Vital signs: /84    Pulse 84   Temp 98.1  F (36.7  C)   Resp 16   SpO2 99%    Weight: [unfilled]   BMI: There is no height or weight on file to calculate BMI.   General: Normal, healthy, cooperative, in no acute distress, alert   Skin: no jaundice   HEENT: PERRLA and EOMI   Neck: supple    Assessment:   35 year old female with need for upper endoscopy and lower endoscopy for Change in Bowel Habits (Diarrhea):    Plan:   Will schedule an esophagogastroduodenoscopy and colonoscopy.  The procedures with their risks, benefits and alternatives were explained.  Risks include but are not limited to bleeding, perforation, missing lesions, need for additional procedures, reaction to anesthesia.  All the patients questions were answered.  The patient consents to proceed.  The procedures will be scheduled.    Will also go ahead and complete her stool workup.

## 2025-07-08 NOTE — PATIENT INSTRUCTIONS
Thank you for allowing Dr. Kee and our surgical team to participate in your care. Please call our health unit coordinator at [phone] with scheduling questions or the nurse at [phone] with any other questions or concerns.    You have been scheduled for: upper endoscopy and colonoscopy with  on 8/7/25.   You will use miralax bowel prep.  Please see handout for additional instruction.  You will not need a pre-operative appointment with your primary care provider.  You may call [phone] or [phone] with any questions.

## 2025-07-09 DIAGNOSIS — K52.9 CHRONIC DIARRHEA OF UNKNOWN ORIGIN: Primary | ICD-10-CM

## 2025-07-09 PROCEDURE — 82274 ASSAY TEST FOR BLOOD FECAL: CPT

## 2025-07-10 ENCOUNTER — LAB (OUTPATIENT)
Dept: LAB | Facility: OTHER | Age: 35
End: 2025-07-10
Payer: COMMERCIAL

## 2025-07-10 DIAGNOSIS — K91.1 DUMPING SYNDROME: Primary | ICD-10-CM

## 2025-07-10 LAB
C DIFF GDH STL QL IA: NEGATIVE
C DIFF TOX A+B STL QL IA: NEGATIVE
C DIFF TOX B STL QL: POSITIVE
HEMOCCULT SP1 STL QL: NEGATIVE
LAB ORDER RESULT STATUS: NORMAL
LABORATORY COMMENT REPORT: ABNORMAL
Lab: NORMAL
PERFORMING LABORATORY: NORMAL
TEST NAME: NORMAL
TTG IGA SER-ACNC: 0.3 U/ML
TTG IGG SER-ACNC: <0.6 U/ML

## 2025-07-12 LAB
FAT STL QL: NORMAL
MISCELLANEOUS TEST 1 (ARUP): NORMAL
NEUTRAL FAT STL QL: NORMAL

## 2025-07-23 ENCOUNTER — ANESTHESIA EVENT (OUTPATIENT)
Dept: SURGERY | Facility: HOSPITAL | Age: 35
End: 2025-07-23
Payer: COMMERCIAL

## 2025-07-27 DIAGNOSIS — H47.10 PAPILLEDEMA OF BOTH EYES: ICD-10-CM

## 2025-07-28 RX ORDER — ACETAZOLAMIDE 500 MG/1
500 CAPSULE, EXTENDED RELEASE ORAL 2 TIMES DAILY
Qty: 60 CAPSULE | Refills: 2 | Status: SHIPPED | OUTPATIENT
Start: 2025-07-28

## 2025-07-28 NOTE — TELEPHONE ENCOUNTER
acetaZOLAMIDE (DIAMOX SEQUEL) 500 MG 12 hr capsule       Last Written Prescription Date:  4/21/2025  Last Fill Quantity: 60,   # refills: 2  Last Office Visit: 6/16/2025  Future Office visit:    Next 5 appointments (look out 90 days)      Jul 29, 2025 10:00 AM  (Arrive by 9:45 AM)  Return Visit with Anshul Kee MD  [facility] [place] ([facility] [place] ) [address]  [place] [address]  [phone]     Aug 27, 2025 3:45 PM  (Arrive by 3:30 PM)  Provider Visit with Jovana Lambert MD  [facility] [place] ([facility] [place] ) [address]  [place] [address]  [phone]

## 2025-07-29 ENCOUNTER — OFFICE VISIT (OUTPATIENT)
Dept: SURGERY | Facility: OTHER | Age: 35
End: 2025-07-29
Attending: SURGERY
Payer: COMMERCIAL

## 2025-07-29 VITALS
RESPIRATION RATE: 16 BRPM | WEIGHT: 195 LBS | HEART RATE: 76 BPM | DIASTOLIC BLOOD PRESSURE: 85 MMHG | SYSTOLIC BLOOD PRESSURE: 128 MMHG | BODY MASS INDEX: 32.49 KG/M2 | TEMPERATURE: 98.2 F | OXYGEN SATURATION: 99 % | HEIGHT: 65 IN

## 2025-07-29 DIAGNOSIS — R19.7 DIARRHEA OF PRESUMED INFECTIOUS ORIGIN: ICD-10-CM

## 2025-07-29 DIAGNOSIS — A04.72 C. DIFFICILE COLITIS: Primary | ICD-10-CM

## 2025-07-29 ASSESSMENT — PAIN SCALES - GENERAL: PAINLEVEL_OUTOF10: NO PAIN (0)

## 2025-07-29 NOTE — PROGRESS NOTES
CLINIC NOTE - FOLLOW UP  7/29/2025    Patient:Meredith Gordon    Reason for Visit: Follow up from prior clinic visit for diarrhea.     This is a 35 year old female here for follow up from a prior clinic visit for diarrhea.  Her prior medical records were reviewed.       Stool studies came back as C. difficile.  She is treated with 14 days of vancomycin.  Still has diarrhea.     Current Medications:  Current Outpatient Medications   Medication Sig Dispense Refill    acetaZOLAMIDE (DIAMOX SEQUEL) 500 MG 12 hr capsule Take 1 Capsule by mouth two times a day. 60 capsule 2    Acetylcysteine, Nutrient, 600 MG TABS Take 600 mg by mouth 2 times daily      cabergoline (DOSTINEX) 0.5 MG tablet Take 1 tablet (0.5 mg) by mouth once a week. 12 tablet 1    FLUoxetine (PROZAC) 20 MG capsule Take 1 capsule (20 mg) by mouth daily. 90 capsule 1    Inositol POWD 2,000 mg 2 times daily      JARDIANCE 25 MG TABS tablet Take 1 tablet (25 mg) by mouth daily. 90 tablet 1    levothyroxine (SYNTHROID/LEVOTHROID) 125 MCG tablet Take 1 Tablet by mouth one time a day. 90 tablet 2    liothyronine (CYTOMEL) 5 MCG tablet Take 1 tablet (5 mcg) by mouth 2 times daily. 180 tablet 1    Naltrexone HCl, Pain, 1.5 MG CAPS Take 4.5 mg by mouth daily. PLEASE COMPOUND RX ACCORDING TO DOSING - PROVIDE 90 DAY SUPPLY 270 capsule 0    Naltrexone POWD 4.5 mg at bedtime. 100 g 0    traZODone (DESYREL) 50 MG tablet Take 1 Tablet by mouth at bedtime. 90 tablet 3    triamcinolone (KENALOG) 0.1 % external cream Apply topically 2 (two) times a day. 45 g 1    vancomycin (VANCOCIN) 125 MG capsule Take 1 capsule (125 mg) by mouth 4 times daily for 14 days. 56 capsule 0    venlafaxine (EFFEXOR XR) 75 MG 24 hr capsule Take 1 capsule (75 mg) by mouth daily. 90 capsule 2    vitamin D3 (CHOLECALCIFEROL) 2000 units (50 mcg) tablet Take 2 tablets (4,000 Units) by mouth daily 90 tablet 1       Allergies:  Allergies   Allergen Reactions    Dogs Angioedema and Itching    Cats  "Itching     Cat/feline product derivatives     PHYSICAL EXAM:     Vital signs: /85 (BP Location: Right arm, Patient Position: Sitting, Cuff Size: Adult Regular)   Pulse 76   Temp 98.2  F (36.8  C) (Tympanic)   Resp 16   Ht 1.651 m (5' 5\")   Wt 88.5 kg (195 lb)   SpO2 99%   BMI 32.45 kg/m     Weight: [unfilled]   BMI: Body mass index is 32.45 kg/m .   General: Normal, healthy, cooperative, in no acute distress, alert   Skin: no jaundice   HEENT: PERRLA and EOMI   Neck: supple     ASSESSMENT:  35 year old female here as follow up from a prior clinic visit for diarrhea.  Stool studies showed C. difficile.  Still having diarrhea.    PLAN: Will go ahead and recheck her stool for C. difficile.  If she is still positive we will treat her with Dificid.  If she has been cleared we will plan on doing a colonoscopy.  Will call her with the results and the plan.          "

## 2025-07-30 ENCOUNTER — APPOINTMENT (OUTPATIENT)
Dept: LAB | Facility: OTHER | Age: 35
End: 2025-07-30
Payer: COMMERCIAL

## 2025-07-30 LAB — C DIFF TOX B STL QL: NEGATIVE

## 2025-08-07 ENCOUNTER — ANESTHESIA (OUTPATIENT)
Dept: SURGERY | Facility: HOSPITAL | Age: 35
End: 2025-08-07
Payer: COMMERCIAL

## 2025-08-07 ENCOUNTER — HOSPITAL ENCOUNTER (OUTPATIENT)
Facility: HOSPITAL | Age: 35
Discharge: HOME | End: 2025-08-07
Attending: SURGERY | Admitting: SURGERY
Payer: COMMERCIAL

## 2025-08-07 ENCOUNTER — LAB (OUTPATIENT)
Dept: LAB | Facility: HOSPITAL | Age: 35
End: 2025-08-07
Attending: SURGERY
Payer: COMMERCIAL

## 2025-08-07 VITALS
SYSTOLIC BLOOD PRESSURE: 114 MMHG | RESPIRATION RATE: 16 BRPM | WEIGHT: 205.2 LBS | OXYGEN SATURATION: 100 % | DIASTOLIC BLOOD PRESSURE: 73 MMHG | HEIGHT: 65 IN | HEART RATE: 88 BPM | BODY MASS INDEX: 34.19 KG/M2 | TEMPERATURE: 97 F

## 2025-08-07 LAB — HCG UR QL: NEGATIVE

## 2025-08-07 PROCEDURE — 999N000141 HC STATISTIC PRE-PROCEDURE NURSING ASSESSMENT: Performed by: SURGERY

## 2025-08-07 PROCEDURE — 250N000011 HC RX IP 250 OP 636: Performed by: NURSE ANESTHETIST, CERTIFIED REGISTERED

## 2025-08-07 PROCEDURE — 43239 EGD BIOPSY SINGLE/MULTIPLE: CPT | Performed by: SURGERY

## 2025-08-07 PROCEDURE — 45380 COLONOSCOPY AND BIOPSY: CPT | Performed by: SURGERY

## 2025-08-07 PROCEDURE — 250N000009 HC RX 250: Performed by: NURSE ANESTHETIST, CERTIFIED REGISTERED

## 2025-08-07 PROCEDURE — 360N000075 HC SURGERY LEVEL 2, PER MIN: Performed by: SURGERY

## 2025-08-07 PROCEDURE — 88305 TISSUE EXAM BY PATHOLOGIST: CPT | Mod: TC | Performed by: SURGERY

## 2025-08-07 PROCEDURE — 370N000017 HC ANESTHESIA TECHNICAL FEE, PER MIN: Performed by: SURGERY

## 2025-08-07 PROCEDURE — 81025 URINE PREGNANCY TEST: CPT | Performed by: NURSE PRACTITIONER

## 2025-08-07 PROCEDURE — 258N000003 HC RX IP 258 OP 636: Performed by: NURSE PRACTITIONER

## 2025-08-07 PROCEDURE — 710N000012 HC RECOVERY PHASE 2, PER MINUTE: Performed by: SURGERY

## 2025-08-07 PROCEDURE — 272N000001 HC OR GENERAL SUPPLY STERILE: Performed by: SURGERY

## 2025-08-07 RX ORDER — OXYCODONE HYDROCHLORIDE 5 MG/1
5 TABLET ORAL
Refills: 0 | Status: CANCELLED | OUTPATIENT
Start: 2025-08-07

## 2025-08-07 RX ORDER — NALOXONE HYDROCHLORIDE 0.4 MG/ML
0.1 INJECTION, SOLUTION INTRAMUSCULAR; INTRAVENOUS; SUBCUTANEOUS
Status: DISCONTINUED | OUTPATIENT
Start: 2025-08-07 | End: 2025-08-07 | Stop reason: HOSPADM

## 2025-08-07 RX ORDER — SODIUM CHLORIDE, SODIUM LACTATE, POTASSIUM CHLORIDE, CALCIUM CHLORIDE 600; 310; 30; 20 MG/100ML; MG/100ML; MG/100ML; MG/100ML
INJECTION, SOLUTION INTRAVENOUS CONTINUOUS
Status: DISCONTINUED | OUTPATIENT
Start: 2025-08-07 | End: 2025-08-07 | Stop reason: HOSPADM

## 2025-08-07 RX ORDER — DEXAMETHASONE SODIUM PHOSPHATE 10 MG/ML
4 INJECTION, SOLUTION INTRAMUSCULAR; INTRAVENOUS
Status: DISCONTINUED | OUTPATIENT
Start: 2025-08-07 | End: 2025-08-07 | Stop reason: HOSPADM

## 2025-08-07 RX ORDER — PROPOFOL 10 MG/ML
INJECTION, EMULSION INTRAVENOUS PRN
Status: DISCONTINUED | OUTPATIENT
Start: 2025-08-07 | End: 2025-08-07

## 2025-08-07 RX ORDER — ONDANSETRON 2 MG/ML
4 INJECTION INTRAMUSCULAR; INTRAVENOUS EVERY 30 MIN PRN
Status: DISCONTINUED | OUTPATIENT
Start: 2025-08-07 | End: 2025-08-07 | Stop reason: HOSPADM

## 2025-08-07 RX ORDER — LIDOCAINE 40 MG/G
CREAM TOPICAL
Status: DISCONTINUED | OUTPATIENT
Start: 2025-08-07 | End: 2025-08-07 | Stop reason: HOSPADM

## 2025-08-07 RX ORDER — ONDANSETRON 4 MG/1
4 TABLET, ORALLY DISINTEGRATING ORAL EVERY 30 MIN PRN
Status: DISCONTINUED | OUTPATIENT
Start: 2025-08-07 | End: 2025-08-07 | Stop reason: HOSPADM

## 2025-08-07 RX ORDER — LIDOCAINE HYDROCHLORIDE 20 MG/ML
INJECTION, SOLUTION INFILTRATION; PERINEURAL PRN
Status: DISCONTINUED | OUTPATIENT
Start: 2025-08-07 | End: 2025-08-07

## 2025-08-07 RX ADMIN — PROPOFOL 50 MG: 10 INJECTION, EMULSION INTRAVENOUS at 07:54

## 2025-08-07 RX ADMIN — PROPOFOL 50 MG: 10 INJECTION, EMULSION INTRAVENOUS at 08:03

## 2025-08-07 RX ADMIN — PROPOFOL 50 MG: 10 INJECTION, EMULSION INTRAVENOUS at 07:42

## 2025-08-07 RX ADMIN — PROPOFOL 50 MG: 10 INJECTION, EMULSION INTRAVENOUS at 07:57

## 2025-08-07 RX ADMIN — PROPOFOL 50 MG: 10 INJECTION, EMULSION INTRAVENOUS at 07:44

## 2025-08-07 RX ADMIN — PROPOFOL 50 MG: 10 INJECTION, EMULSION INTRAVENOUS at 07:46

## 2025-08-07 RX ADMIN — PROPOFOL 50 MG: 10 INJECTION, EMULSION INTRAVENOUS at 08:00

## 2025-08-07 RX ADMIN — PROPOFOL 50 MG: 10 INJECTION, EMULSION INTRAVENOUS at 07:52

## 2025-08-07 RX ADMIN — LIDOCAINE HYDROCHLORIDE 100 MG: 20 INJECTION, SOLUTION INFILTRATION; PERINEURAL at 07:41

## 2025-08-07 RX ADMIN — PROPOFOL 50 MG: 10 INJECTION, EMULSION INTRAVENOUS at 07:50

## 2025-08-07 RX ADMIN — SODIUM CHLORIDE, POTASSIUM CHLORIDE, SODIUM LACTATE AND CALCIUM CHLORIDE: 600; 310; 30; 20 INJECTION, SOLUTION INTRAVENOUS at 07:11

## 2025-08-07 RX ADMIN — PROPOFOL 50 MG: 10 INJECTION, EMULSION INTRAVENOUS at 07:45

## 2025-08-07 RX ADMIN — PROPOFOL 50 MG: 10 INJECTION, EMULSION INTRAVENOUS at 07:48

## 2025-08-07 RX ADMIN — PROPOFOL 50 MG: 10 INJECTION, EMULSION INTRAVENOUS at 07:47

## 2025-08-07 ASSESSMENT — ACTIVITIES OF DAILY LIVING (ADL)
ADLS_ACUITY_SCORE: 41
ADLS_ACUITY_SCORE: 41

## 2025-08-11 LAB
PATH REPORT.COMMENTS IMP SPEC: NORMAL
PATH REPORT.COMMENTS IMP SPEC: NORMAL
PATH REPORT.FINAL DX SPEC: NORMAL
PATH REPORT.GROSS SPEC: NORMAL
PATH REPORT.MICROSCOPIC SPEC OTHER STN: NORMAL
PATH REPORT.MICROSCOPIC SPEC OTHER STN: NORMAL
PATH REPORT.RELEVANT HX SPEC: NORMAL
PHOTO IMAGE: NORMAL

## 2025-08-19 ENCOUNTER — OFFICE VISIT (OUTPATIENT)
Dept: SURGERY | Facility: OTHER | Age: 35
End: 2025-08-19
Attending: NURSE PRACTITIONER
Payer: COMMERCIAL

## 2025-08-19 VITALS
HEIGHT: 65 IN | BODY MASS INDEX: 34.16 KG/M2 | SYSTOLIC BLOOD PRESSURE: 108 MMHG | HEART RATE: 74 BPM | TEMPERATURE: 96.8 F | DIASTOLIC BLOOD PRESSURE: 72 MMHG | OXYGEN SATURATION: 99 % | WEIGHT: 205 LBS | RESPIRATION RATE: 16 BRPM

## 2025-08-19 DIAGNOSIS — R19.7 DIARRHEA, UNSPECIFIED TYPE: ICD-10-CM

## 2025-08-19 DIAGNOSIS — K21.00 GASTROESOPHAGEAL REFLUX DISEASE WITH ESOPHAGITIS WITHOUT HEMORRHAGE: Primary | ICD-10-CM

## 2025-08-19 DIAGNOSIS — K52.9 NON-SPECIFIC COLITIS: ICD-10-CM

## 2025-08-19 ASSESSMENT — PAIN SCALES - GENERAL: PAINLEVEL_OUTOF10: NO PAIN (0)

## (undated) DEVICE — CAUTERY-MICROFINE NEEDLE

## (undated) DEVICE — IRRIGATION-NACL 1000ML

## (undated) DEVICE — TUBING-SUCTION 20FT

## (undated) DEVICE — TUBING SUCTION 20FT N620A

## (undated) DEVICE — CANISTER-SUCTION 2000CC

## (undated) DEVICE — SYRINGE-20CC LUER LOCK

## (undated) DEVICE — SOL WATER IRRIG 1000ML BOTTLE 2F7114

## (undated) DEVICE — SUTURE-CHROMIC GUT 3-0 SH G122H

## (undated) DEVICE — LABEL-STERILE PREPRINTED FOR OR

## (undated) DEVICE — GLV-7.5 BIOGEL LATEX

## (undated) DEVICE — PACK-LAPAROTOMY-CUSTOM

## (undated) DEVICE — BLADE-SCALPEL #15

## (undated) DEVICE — SYRINGE-TUBERCULIN 1CC 27G SAFETY

## (undated) DEVICE — SUTURE-CHROMIC GUT 2-0 SH G123H

## (undated) DEVICE — LIGHT HANDLE COVER FOR SKYTRON LIGHTS

## (undated) DEVICE — IRRIGATION-H2O 1000ML

## (undated) DEVICE — DRSG-KERLIX 6 X 6 3/4 FLUFF

## (undated) DEVICE — SUCTION TUBE-YANKAUR

## (undated) DEVICE — PACK-BASIN SET-UP

## (undated) DEVICE — SUCTION MANIFOLD NEPTUNE 2 SYS 1 PORT 702-025-000

## (undated) DEVICE — DRSG-SPONGE STERILE 4 X 4

## (undated) DEVICE — FORCEPS BIOPSY RADIAL JAW 4 LARGE W/NEEDLE 240CM M00513332

## (undated) DEVICE — PANTIES-MESH L/XL

## (undated) DEVICE — CANISTER SUCTION MEDI-VAC GUARDIAN 2000ML 90D 65651-220

## (undated) DEVICE — CONNECTOR ERBEFLO 2 PORT 20325-215

## (undated) DEVICE — CAUTERY PAD-POLYHESIVE II ADULT

## (undated) DEVICE — SUTURE-CHROMIC GUT 0 CT-1 812H

## (undated) DEVICE — SCD SLEEVE-KNEE REG.

## (undated) DEVICE — LIGASURE-SMALL JAW SEALER/DIVIDER

## (undated) DEVICE — TRAY-SKIN PREP POVIDONE/IODINE

## (undated) DEVICE — BLANKET-BAIR UPPER BODY

## (undated) RX ORDER — PROPOFOL 10 MG/ML
INJECTION, EMULSION INTRAVENOUS
Status: DISPENSED
Start: 2025-08-07

## (undated) RX ORDER — PROPOFOL 10 MG/ML
INJECTION, EMULSION INTRAVENOUS
Status: DISPENSED
Start: 2022-08-09

## (undated) RX ORDER — KETOROLAC TROMETHAMINE 30 MG/ML
INJECTION, SOLUTION INTRAMUSCULAR; INTRAVENOUS
Status: DISPENSED
Start: 2022-08-09

## (undated) RX ORDER — LIDOCAINE HYDROCHLORIDE 20 MG/ML
INJECTION, SOLUTION EPIDURAL; INFILTRATION; INTRACAUDAL; PERINEURAL
Status: DISPENSED
Start: 2022-08-09

## (undated) RX ORDER — FENTANYL CITRATE 50 UG/ML
INJECTION, SOLUTION INTRAMUSCULAR; INTRAVENOUS
Status: DISPENSED
Start: 2022-08-09

## (undated) RX ORDER — DEXAMETHASONE SODIUM PHOSPHATE 10 MG/ML
INJECTION, SOLUTION INTRAMUSCULAR; INTRAVENOUS
Status: DISPENSED
Start: 2022-08-09

## (undated) RX ORDER — ONDANSETRON 2 MG/ML
INJECTION INTRAMUSCULAR; INTRAVENOUS
Status: DISPENSED
Start: 2022-08-09